# Patient Record
Sex: FEMALE | Race: WHITE | Employment: OTHER | URBAN - METROPOLITAN AREA
[De-identification: names, ages, dates, MRNs, and addresses within clinical notes are randomized per-mention and may not be internally consistent; named-entity substitution may affect disease eponyms.]

---

## 2017-02-07 ENCOUNTER — OFFICE VISIT (OUTPATIENT)
Dept: ORTHOPEDIC SURGERY | Age: 57
End: 2017-02-07

## 2017-02-07 VITALS
HEART RATE: 94 BPM | BODY MASS INDEX: 22.21 KG/M2 | DIASTOLIC BLOOD PRESSURE: 76 MMHG | WEIGHT: 130.07 LBS | HEIGHT: 64 IN | SYSTOLIC BLOOD PRESSURE: 121 MMHG

## 2017-02-07 DIAGNOSIS — M19.079 ARTHRITIS OF ANKLE JOINT: ICD-10-CM

## 2017-02-07 DIAGNOSIS — M25.571 RIGHT ANKLE PAIN, UNSPECIFIED CHRONICITY: Primary | ICD-10-CM

## 2017-02-07 PROCEDURE — 99212 OFFICE O/P EST SF 10 MIN: CPT | Performed by: ORTHOPAEDIC SURGERY

## 2017-02-07 PROCEDURE — 73610 X-RAY EXAM OF ANKLE: CPT | Performed by: ORTHOPAEDIC SURGERY

## 2017-02-23 ENCOUNTER — TELEPHONE (OUTPATIENT)
Dept: OTHER | Facility: CLINIC | Age: 57
End: 2017-02-23

## 2017-08-18 DIAGNOSIS — M19.079 ARTHRITIS OF ANKLE JOINT: Primary | ICD-10-CM

## 2017-08-18 PROCEDURE — L1902 AFO ANKLE GAUNTLET PRE OTS: HCPCS | Performed by: ORTHOPAEDIC SURGERY

## 2017-11-10 ENCOUNTER — OFFICE VISIT (OUTPATIENT)
Dept: ORTHOPEDIC SURGERY | Age: 57
End: 2017-11-10

## 2017-11-10 VITALS
SYSTOLIC BLOOD PRESSURE: 113 MMHG | HEART RATE: 75 BPM | HEIGHT: 64 IN | BODY MASS INDEX: 22.21 KG/M2 | DIASTOLIC BLOOD PRESSURE: 80 MMHG | WEIGHT: 130.07 LBS

## 2017-11-10 DIAGNOSIS — M19.079 ARTHRITIS OF ANKLE JOINT: Primary | ICD-10-CM

## 2017-11-10 DIAGNOSIS — M25.561 RIGHT KNEE PAIN, UNSPECIFIED CHRONICITY: ICD-10-CM

## 2017-11-10 PROCEDURE — 99213 OFFICE O/P EST LOW 20 MIN: CPT | Performed by: ORTHOPAEDIC SURGERY

## 2017-11-10 NOTE — PROGRESS NOTES
Subjective: Patient states that she is here for a new problem which is right knee and right ankle pain. She is status post pilon fracture open reduction internal fixation states her ankle is doing well and then about 6 months ago she started developing pain. She is here with her . Rates her pain at 7 out of 10. Being on it makes it worse ice and pain medicine make it better  Objective: Physical exam shows well-healed surgical incisions over the medial lateral aspect of the right ankle. She has 0° of dorsiflexion and 20° of plantarflexion anterior drawer and talar tilt showed no gross laxity sensations intact strength is at least 3+ to 4 minus over 5 the available range. He is alert and oriented ×3 DTRs are 2+ and symmetric has full range of motion of the right knee no varus valgus instability and posterior drawer negative Lachman's is negative strength is 4+ over 5 the knee flexion and extension  Imaging: 3 views of the right knee show mild degenerative changes. 3 views of the right ankle show hardware unchanged in position she has obvious posttraumatic arthritis of the ankle  Assessment and plan: This patient has posttraumatic arthritis of the right ankle and new onset right knee pain with no locking or catching. I would recommend she chief this symptomatically for right now. I could inject her ankle in the future if she wants.   We also talked about good therapy regimen

## 2018-08-30 ENCOUNTER — HOSPITAL ENCOUNTER (EMERGENCY)
Age: 58
Discharge: HOME | End: 2018-08-30
Attending: EMERGENCY MEDICINE
Payer: MEDICARE

## 2018-08-30 VITALS
WEIGHT: 120 LBS | DIASTOLIC BLOOD PRESSURE: 105 MMHG | BODY MASS INDEX: 20.49 KG/M2 | OXYGEN SATURATION: 100 % | HEART RATE: 75 BPM | SYSTOLIC BLOOD PRESSURE: 156 MMHG | RESPIRATION RATE: 16 BRPM | TEMPERATURE: 98.2 F

## 2018-08-30 DIAGNOSIS — F13.939 BENZODIAZEPINE WITHDRAWAL WITH COMPLICATION (HCC): Primary | ICD-10-CM

## 2018-08-30 DIAGNOSIS — R11.2 NON-INTRACTABLE VOMITING WITH NAUSEA, UNSPECIFIED VOMITING TYPE: ICD-10-CM

## 2018-08-30 LAB
A/G RATIO: 1.7 (ref 1.1–2.2)
ALBUMIN SERPL-MCNC: 5.3 G/DL (ref 3.4–5)
ALP BLD-CCNC: 55 U/L (ref 40–129)
ALT SERPL-CCNC: 12 U/L (ref 10–40)
ANION GAP SERPL CALCULATED.3IONS-SCNC: 16 MMOL/L (ref 3–16)
AST SERPL-CCNC: 13 U/L (ref 15–37)
BACTERIA: ABNORMAL /HPF
BILIRUB SERPL-MCNC: 0.6 MG/DL (ref 0–1)
BILIRUBIN URINE: ABNORMAL
BLOOD, URINE: NEGATIVE
BUN BLDV-MCNC: 12 MG/DL (ref 7–20)
CALCIUM SERPL-MCNC: 10.8 MG/DL (ref 8.3–10.6)
CHLORIDE BLD-SCNC: 97 MMOL/L (ref 99–110)
CLARITY: ABNORMAL
CO2: 28 MMOL/L (ref 21–32)
COLOR: YELLOW
CREAT SERPL-MCNC: 0.6 MG/DL (ref 0.6–1.1)
EPITHELIAL CELLS, UA: ABNORMAL /HPF
ETHANOL: NORMAL MG/DL (ref 0–0.08)
GFR AFRICAN AMERICAN: >60
GFR NON-AFRICAN AMERICAN: >60
GLOBULIN: 3.2 G/DL
GLUCOSE BLD-MCNC: 115 MG/DL (ref 70–99)
GLUCOSE URINE: NEGATIVE MG/DL
HCT VFR BLD CALC: 42.5 % (ref 36–48)
HEMOGLOBIN: 14.6 G/DL (ref 12–16)
KETONES, URINE: 15 MG/DL
LEUKOCYTE ESTERASE, URINE: NEGATIVE
LIPASE: 33 U/L (ref 13–60)
MCH RBC QN AUTO: 30.1 PG (ref 26–34)
MCHC RBC AUTO-ENTMCNC: 34.3 G/DL (ref 31–36)
MCV RBC AUTO: 87.6 FL (ref 80–100)
MICROSCOPIC EXAMINATION: YES
MUCUS: ABNORMAL /LPF
NITRITE, URINE: NEGATIVE
PDW BLD-RTO: 12.9 % (ref 12.4–15.4)
PH UA: 6
PLATELET # BLD: 475 K/UL (ref 135–450)
PMV BLD AUTO: 6.7 FL (ref 5–10.5)
POTASSIUM SERPL-SCNC: 3.8 MMOL/L (ref 3.5–5.1)
PROTEIN UA: 100 MG/DL
RBC # BLD: 4.85 M/UL (ref 4–5.2)
RBC UA: ABNORMAL /HPF (ref 0–2)
SODIUM BLD-SCNC: 141 MMOL/L (ref 136–145)
SPECIFIC GRAVITY UA: >=1.03
TOTAL PROTEIN: 8.5 G/DL (ref 6.4–8.2)
URINE TYPE: ABNORMAL
UROBILINOGEN, URINE: 0.2 E.U./DL
WBC # BLD: 8.1 K/UL (ref 4–11)
WBC UA: ABNORMAL /HPF (ref 0–5)

## 2018-08-30 PROCEDURE — 96375 TX/PRO/DX INJ NEW DRUG ADDON: CPT

## 2018-08-30 PROCEDURE — 99284 EMERGENCY DEPT VISIT MOD MDM: CPT

## 2018-08-30 PROCEDURE — 96372 THER/PROPH/DIAG INJ SC/IM: CPT

## 2018-08-30 PROCEDURE — 96361 HYDRATE IV INFUSION ADD-ON: CPT

## 2018-08-30 PROCEDURE — 81001 URINALYSIS AUTO W/SCOPE: CPT

## 2018-08-30 PROCEDURE — 96374 THER/PROPH/DIAG INJ IV PUSH: CPT

## 2018-08-30 PROCEDURE — 80053 COMPREHEN METABOLIC PANEL: CPT

## 2018-08-30 PROCEDURE — 6360000002 HC RX W HCPCS: Performed by: PHYSICIAN ASSISTANT

## 2018-08-30 PROCEDURE — 2580000003 HC RX 258: Performed by: PHYSICIAN ASSISTANT

## 2018-08-30 PROCEDURE — 85027 COMPLETE CBC AUTOMATED: CPT

## 2018-08-30 PROCEDURE — G0480 DRUG TEST DEF 1-7 CLASSES: HCPCS

## 2018-08-30 PROCEDURE — 83690 ASSAY OF LIPASE: CPT

## 2018-08-30 RX ORDER — ONDANSETRON 2 MG/ML
4 INJECTION INTRAMUSCULAR; INTRAVENOUS ONCE
Status: COMPLETED | OUTPATIENT
Start: 2018-08-30 | End: 2018-08-30

## 2018-08-30 RX ORDER — 0.9 % SODIUM CHLORIDE 0.9 %
1000 INTRAVENOUS SOLUTION INTRAVENOUS ONCE
Status: COMPLETED | OUTPATIENT
Start: 2018-08-30 | End: 2018-08-30

## 2018-08-30 RX ORDER — FLUTICASONE PROPIONATE 50 MCG
1 SPRAY, SUSPENSION (ML) NASAL DAILY
COMMUNITY

## 2018-08-30 RX ORDER — ONDANSETRON 4 MG/1
4 TABLET, ORALLY DISINTEGRATING ORAL EVERY 8 HOURS PRN
Qty: 20 TABLET | Refills: 0 | Status: SHIPPED | OUTPATIENT
Start: 2018-08-30

## 2018-08-30 RX ORDER — ALBUTEROL SULFATE 90 UG/1
2 AEROSOL, METERED RESPIRATORY (INHALATION) EVERY 6 HOURS PRN
COMMUNITY

## 2018-08-30 RX ORDER — TAMSULOSIN HYDROCHLORIDE 0.4 MG/1
0.4 CAPSULE ORAL DAILY
COMMUNITY

## 2018-08-30 RX ORDER — LORAZEPAM 2 MG/ML
0.5 INJECTION INTRAMUSCULAR ONCE
Status: COMPLETED | OUTPATIENT
Start: 2018-08-30 | End: 2018-08-30

## 2018-08-30 RX ORDER — ALENDRONATE SODIUM 70 MG/1
70 TABLET ORAL
COMMUNITY

## 2018-08-30 RX ORDER — PROMETHAZINE HYDROCHLORIDE 25 MG/ML
25 INJECTION, SOLUTION INTRAMUSCULAR; INTRAVENOUS ONCE
Status: COMPLETED | OUTPATIENT
Start: 2018-08-30 | End: 2018-08-30

## 2018-08-30 RX ADMIN — LORAZEPAM 0.5 MG: 2 INJECTION, SOLUTION INTRAMUSCULAR; INTRAVENOUS at 11:41

## 2018-08-30 RX ADMIN — ONDANSETRON HYDROCHLORIDE 4 MG: 2 INJECTION, SOLUTION INTRAMUSCULAR; INTRAVENOUS at 11:41

## 2018-08-30 RX ADMIN — PROMETHAZINE HYDROCHLORIDE 25 MG: 25 INJECTION INTRAMUSCULAR; INTRAVENOUS at 14:19

## 2018-08-30 RX ADMIN — SODIUM CHLORIDE 1000 ML: 9 INJECTION, SOLUTION INTRAVENOUS at 11:41

## 2018-08-30 RX ADMIN — SODIUM CHLORIDE 1000 ML: 9 INJECTION, SOLUTION INTRAVENOUS at 14:19

## 2018-08-30 NOTE — ED PROVIDER NOTES
I independently performed a history and physical on [facility]. All diagnostic, treatment, and disposition decisions were made by myself in conjunction with the advanced practice provider. For further details of [facility] emergency department encounter, please see advanced practice provider's documentation    This is a 62-year-old female presenting with nausea and vomiting over the past several days after being taken off Klonopin abruptly. Patient denies any pain anywhere. She is currently taking Norco as well but has some difficulty keeping it down because of her nausea and vomiting. Physical examination: Abdomen soft nontender    Patient was given several hours of supportive care and is feeling much better upon reassessment. Most likely her symptoms are related to Klonopin withdrawal and patient does not want to be put back on Klonopin. Patient is tolerating p.o. fluids prior to discharge.      Tiarra Romo,   08/31/18 1152

## 2018-08-30 NOTE — ED PROVIDER NOTES
kg/m²   GENERAL APPEARANCE: Awake and alert. Cooperative. No acute distress. HEAD: Normocephalic. Atraumatic. EYES: PERRL. EOM's grossly intact. ENT: Mucous membranes are moist.   NECK: Supple. No JVD. No tracheal tenderness or deviation. No crepitus. HEART: RRR. No murmurs. LUNGS: Respirations unlabored. CTAB. Good air exchange. Speaking comfortably in full sentences. ABDOMEN: Soft. Non-distended. Non-tender. No guarding or rebound. Negative Saini's, McBurney's, Rovsing's. No fluids or ascites. No hernias or masses. Bowel sounds normal August.  No CVA tenderness. EXTREMITIES: No peripheral edema. No unilateral calf pain, redness or swelling. Moves all extremities equally. All extremities neurovascularly intact. SKIN: Warm and dry. No acute rashes. NEUROLOGICAL: Alert and oriented. CN's 2-12 intact. No gross facial drooping. Strength 5/5, sensation intact. PSYCHIATRIC: Normal mood and affect. Seems slightly withdrawn. No hallucinations or delusions. No homicidal or suicidal ideations. ED COURSE   I have evaluated this patient in collaboration with Dr. Ashli Zayas. Pain control was not required while here in the emergency department. triage vitals are stable. Urinalysis 3-5 whites, 3-5 obese with 3+ bacteria. Appears to be a contaminated specimen. Patient continues to have dysuria. He is without leukocytosis or anemia. CMP unremarkable. Lipase negative. No ethanol was detected. By mouth challenge was tolerated well however patient did vomit shortly thereafter. Was given dose of Phenergan and additional fluids. On reevaluation feels better and comfortable with discharge. He'll be discharged home on oral antiemetics with recommendations for close and continued to be follow-up. Return precautions discussed and patient is in agreement and comfortable discharge. A discussion was had Mrs. Malin regarding nausea and vomiting, ED findings and recommendations for follow-up.  All YES     Urine Type Not Specified    Microscopic Urinalysis   Result Value Ref Range    Mucus, UA 4+ (A) /LPF    WBC, UA 3-5 0 - 5 /HPF    RBC, UA 0-2 0 - 2 /HPF    Epi Cells 3-5 /HPF    Bacteria, UA 3+ (A) /HPF     I estimate there is LOW risk for ACUTE CORONARY SYNDROME, INTRACRANIAL HEMORRHAGE, MALIGNANT DYSRHYTHMIA, MENINGITIS, PNEUMONIA, PULMONARY EMBOLISM, SEPSIS, SUBARACHNOID HEMORRHAGE, SUBDURAL HEMATOMA, STROKE, or URINARY TRACT INFECTION, thus I consider the discharge disposition reasonable. Mic Schmitt and I have discussed the diagnosis and risks, and we agree with discharging home to follow-up with their primary doctor. We also discussed returning to the Emergency Department immediately if new or worsening symptoms occur. We have discussed the symptoms which are most concerning (e.g., changing or worsening pain, weakness, vomiting, fever) that necessitate immediate return. Final Impression  1. Benzodiazepine withdrawal with complication ([address]    2. Non-intractable vomiting with nausea, unspecified vomiting type      Blood pressure (!) 156/105, pulse 75, temperature 98.2 °F (36.8 °C), resp. rate 16, weight 120 lb (54.4 kg), last menstrual period 09/22/2011, SpO2 100 %. DISPOSITION  Patient was discharged to home in good condition.          [place], [place]  36/80/99 7504

## 2019-07-15 ENCOUNTER — TELEPHONE (OUTPATIENT)
Dept: ORTHOPEDIC SURGERY | Age: 59
End: 2019-07-15

## 2019-07-15 DIAGNOSIS — M25.559 ARTHRALGIA OF HIP, UNSPECIFIED LATERALITY: Primary | ICD-10-CM

## 2019-08-13 ENCOUNTER — HOSPITAL ENCOUNTER (OUTPATIENT)
Dept: PHYSICAL THERAPY | Age: 59
Setting detail: THERAPIES SERIES
Discharge: HOME | End: 2019-08-13
Payer: MEDICARE

## 2019-08-13 PROCEDURE — 97162 PT EVAL MOD COMPLEX 30 MIN: CPT

## 2019-08-13 PROCEDURE — 97530 THERAPEUTIC ACTIVITIES: CPT

## 2019-08-13 ASSESSMENT — PAIN SCALES - GENERAL: PAINLEVEL_OUTOF10: 6

## 2019-08-13 ASSESSMENT — PAIN DESCRIPTION - PAIN TYPE: TYPE: CHRONIC PAIN

## 2019-08-13 ASSESSMENT — PAIN DESCRIPTION - LOCATION: LOCATION: HIP;BUTTOCKS;BACK

## 2019-08-13 ASSESSMENT — PAIN DESCRIPTION - ORIENTATION: ORIENTATION: LEFT

## 2019-08-13 NOTE — PROGRESS NOTES
Manual Therapy - Joint Manipulation, Home Exercise Program, Endurance Training, Gait Training    Goals  Short term goals  Time Frame for Short term goals: 4 weeks  Short term goal 1: pt will be indep in HEP  Short term goal 2: pt will decrease pain 25%  Short term goal 3: pt will increase B hip and lumbar stabilizer strength to 4/5  Short term goal 4: pt will return to clinic with symmetrical pelvic alignment  Short term goal 5: pt will return to walking program       Therapy Time   Individual Concurrent Group Co-treatment   Time In 1600         Time Out 1700         Minutes 60         Timed Code Treatment Minutes: [address]  [facility], [address] [facility] [address]

## 2019-08-23 ENCOUNTER — HOSPITAL ENCOUNTER (OUTPATIENT)
Dept: PHYSICAL THERAPY | Age: 59
Setting detail: THERAPIES SERIES
Discharge: HOME | End: 2019-08-23
Payer: MEDICARE

## 2019-08-23 PROCEDURE — 97110 THERAPEUTIC EXERCISES: CPT

## 2019-08-23 PROCEDURE — 97140 MANUAL THERAPY 1/> REGIONS: CPT

## 2019-08-23 NOTE — FLOWSHEET NOTE
Physical Therapy Daily Treatment Note    Date:  2019    Patient Name:  Asuncion Matamoros    :  1960  MRN: [id number]  Restrictions/Precautions:    Medical/Treatment Diagnosis Information:  · Diagnosis: arthralgia of hip  Insurance/Certification information:  PT Insurance Information: [employer]  Physician Information:  Referring Practitioner: Aneta Levi MD  Plan of care signed (Y/N):  N  Visit# / total visits:       Time in:   9:30     Timed Treatment: 30 Total Treatment Time:  30  Time out: 10:00  ________________________________________________________________________________________    Pain Level:    /10  SUBJECTIVE:  Pt reports that she is leaving for [address] next week and will be gone for an unknown amount of time. Would like an exercise program to work on while there. OBJECTIVE:     Exercise/Equipment Resistance/Repetitions Other comments   TG 5 min           Clams #1 10x3\" hold LLE    bridge 10x3\" hold    Supine clams  SL hip ER with stabilization  x15  x10 B Green TB   TA set Until control demo'd  3 min                                                                                                    Other Therapeutic Activities:  Pt educated on biomechanics and anatomy of lumbar spine, sacrum, SIJ, hip joint, lumbopelvic stabilizers, and neurodynamics. Manual Treatments:   LLE inferior hip distraction mobilization grade IV; LLE side-lying hip mobilizations grade III/IV; neural tracing glut LLE; STM to L glut     Modalities:      Test/Measurements:         ASSESSMENT:    Pt tolerated session well. Increased L hip mobility following session. Pt with dizziness and staggering upon position change. Pt will return to PT when she returns from [address].     Treatment/Activity Tolerance:   [x]Patient tolerated treatment well [] Patient limited by fatique  []Patient limited by pain [] Patient limited by other medical complications  [] Other:     Goals:    Short term goals  Time Frame for

## 2019-08-28 ENCOUNTER — HOSPITAL ENCOUNTER (OUTPATIENT)
Dept: PHYSICAL THERAPY | Age: 59
Setting detail: THERAPIES SERIES
End: 2019-08-28
Payer: MEDICARE

## 2019-08-30 ENCOUNTER — APPOINTMENT (OUTPATIENT)
Dept: PHYSICAL THERAPY | Age: 59
End: 2019-08-30
Payer: MEDICARE

## 2019-12-17 ENCOUNTER — HOSPITAL ENCOUNTER (OUTPATIENT)
Dept: PHYSICAL THERAPY | Age: 59
Setting detail: THERAPIES SERIES
Discharge: HOME | End: 2019-12-17
Payer: MEDICARE

## 2019-12-17 PROCEDURE — 97112 NEUROMUSCULAR REEDUCATION: CPT

## 2019-12-17 PROCEDURE — 97162 PT EVAL MOD COMPLEX 30 MIN: CPT

## 2019-12-17 ASSESSMENT — PAIN SCALES - GENERAL: PAINLEVEL_OUTOF10: 5

## 2019-12-17 ASSESSMENT — PAIN DESCRIPTION - ORIENTATION: ORIENTATION: LEFT

## 2019-12-17 ASSESSMENT — PAIN DESCRIPTION - LOCATION: LOCATION: BUTTOCKS

## 2019-12-24 ENCOUNTER — HOSPITAL ENCOUNTER (OUTPATIENT)
Dept: PHYSICAL THERAPY | Age: 59
Setting detail: THERAPIES SERIES
Discharge: HOME | End: 2019-12-24
Payer: MEDICARE

## 2019-12-27 ENCOUNTER — HOSPITAL ENCOUNTER (OUTPATIENT)
Dept: PHYSICAL THERAPY | Age: 59
Setting detail: THERAPIES SERIES
Discharge: HOME | End: 2019-12-27
Payer: MEDICARE

## 2019-12-27 PROCEDURE — 97110 THERAPEUTIC EXERCISES: CPT

## 2019-12-27 PROCEDURE — 97140 MANUAL THERAPY 1/> REGIONS: CPT

## 2019-12-31 ENCOUNTER — HOSPITAL ENCOUNTER (OUTPATIENT)
Dept: PHYSICAL THERAPY | Age: 59
Setting detail: THERAPIES SERIES
Discharge: HOME | End: 2019-12-31
Payer: MEDICARE

## 2019-12-31 PROCEDURE — 97110 THERAPEUTIC EXERCISES: CPT

## 2019-12-31 NOTE — FLOWSHEET NOTE
strength to 4/5  Short term goal 4: pt will ambulate without lumbar extension rotation patterns           Plan: [x] Continue per plan of care [] Alter current plan (see comments)   [] Plan of care initiated [] Hold pending MD visit [] Discharge      Plan for Next Session:  Biomechanical correction of problems as they present. Facilitate correct muscle firing patterns and activation with appropriate activities. Progress patient as tolerated.      Re-Certification Due Date:         Signature:  Bri Rubio PT

## 2020-01-03 ENCOUNTER — HOSPITAL ENCOUNTER (OUTPATIENT)
Dept: PHYSICAL THERAPY | Age: 60
Setting detail: THERAPIES SERIES
Discharge: HOME | End: 2020-01-03
Payer: MEDICARE

## 2020-01-03 PROCEDURE — 97110 THERAPEUTIC EXERCISES: CPT

## 2020-01-03 NOTE — FLOWSHEET NOTE
Physical Therapy Daily Treatment Note    Date:  1/3/2020    Patient Name:  Elfreda Galeazzi    :  1960  MRN: [id number]  Restrictions/Precautions:    Medical/Treatment Diagnosis Information:  · Diagnosis: lumbar facet arthropathy, lumbar DDD, R ankle OA  Insurance/Certification information:  PT Insurance Information: [employer]  Physician Information:  Referring Practitioner: Candelario Claudio MD  Plan of care signed (Y/N):  N  Visit# / total visits:      G-Code (if applicable):          LEFS 15/80    Time in:   1:30      Timed Treatment: 30 Total Treatment Time:  30  ________________________________________________________________________________________    Pain Level:    /10  SUBJECTIVE:  Pt reports that her arms and neck were sore after the last session. OBJECTIVE: stabilization protocol    Exercise/Equipment Resistance/Repetitions Other comments   TG 5 min           TA set    BKF    Marches 15x5\" hold  x10 B  x10 B    Bridge x10    Supine clams  SL hip ER with stabilization  Green TB   Supine H' neuro re-ed 4 min BLE IR/ER                                                                                                   Other Therapeutic Activities:  Pt educated on POC. Pt also educated on anatomy and biomechanics of lumbar spine, sacrum, pelvis, and neurodynamics of BLEs. Manual Treatments:   Inferior hip distraction grade IV BLE; BLE leg pull      Modalities:      Test/Measurements:  See eval       ASSESSMENT:  Pt tolerated session well. Progress as pt tolerates.      Treatment/Activity Tolerance:   [x]Patient tolerated treatment well [] Patient limited by fatique  []Patient limited by pain [] Patient limited by other medical complications  [] Other:     Goals:    Short term goals  Time Frame for Short term goals: 4 weeks  Short term goal 1: pt will be indep in HEP  Short term goal 2: pt will decrease pain 25%  Short term goal 3: pt will increase B hip and lumbar stabilizer strength to 4/5  Short term goal 4: pt will ambulate without lumbar extension rotation patterns           Plan: [x] Continue per plan of care [] Alter current plan (see comments)   [] Plan of care initiated [] Hold pending MD visit [] Discharge      Plan for Next Session:  Biomechanical correction of problems as they present. Facilitate correct muscle firing patterns and activation with appropriate activities. Progress patient as tolerated.      Re-Certification Due Date:         Signature:  Bib Muñiz PT

## 2020-01-07 ENCOUNTER — HOSPITAL ENCOUNTER (OUTPATIENT)
Dept: PHYSICAL THERAPY | Age: 60
Setting detail: THERAPIES SERIES
Discharge: HOME | End: 2020-01-07
Payer: MEDICARE

## 2020-01-07 PROCEDURE — 97110 THERAPEUTIC EXERCISES: CPT

## 2020-01-07 NOTE — FLOWSHEET NOTE
Physical Therapy Daily Treatment Note    Date:  2020    Patient Name:  Amber Villareal    :  1960  MRN: [id number]  Restrictions/Precautions:    Medical/Treatment Diagnosis Information:  · Diagnosis: lumbar facet arthropathy, lumbar DDD, R ankle OA  Insurance/Certification information:  PT Insurance Information: [place]  Physician Information:  Referring Practitioner: Khari Mcclellan MD  Plan of care signed (Y/N):  N  Visit# / total visits:      G-Code (if applicable):          LEFS 15/80    Time in:   2:30      Timed Treatment: 30 Total Treatment Time:  30  ________________________________________________________________________________________    Pain Level:    /10  SUBJECTIVE:  Pt reports that her arms and neck were sore after the last session. OBJECTIVE: stabilization protocol    Exercise/Equipment Resistance/Repetitions Other comments   TG 5 min           TA set    BKF    Marches     Bridge x10    Supine clams  SL hip ER with stabilization x15  x10 Green TB   Supine H' neuro re-ed 4 min BLE IR/ER   3-way ball compression 2x30\"    Supine sliders 2x15 LLE    Supine lumbopelvic stabilization 2 min                                                                               Other Therapeutic Activities:  Pt educated on POC. Pt also educated on anatomy and biomechanics of lumbar spine, sacrum, pelvis, and neurodynamics of BLEs. Manual Treatments:   Inferior hip distraction grade IV BLE; BLE leg pull      Modalities:      Test/Measurements:  See eval       ASSESSMENT:  Pt tolerated session well. Progress as pt tolerates.      Treatment/Activity Tolerance:   [x]Patient tolerated treatment well [] Patient limited by fatique  []Patient limited by pain [] Patient limited by other medical complications  [] Other:     Goals:    Short term goals  Time Frame for Short term goals: 4 weeks  Short term goal 1: pt will be indep in HEP  Short term goal 2: pt will decrease pain 25%  Short term goal 3: pt will

## 2020-01-10 ENCOUNTER — HOSPITAL ENCOUNTER (OUTPATIENT)
Dept: PHYSICAL THERAPY | Age: 60
Setting detail: THERAPIES SERIES
Discharge: HOME | End: 2020-01-10
Payer: MEDICARE

## 2020-01-10 PROCEDURE — 97110 THERAPEUTIC EXERCISES: CPT

## 2020-01-10 NOTE — FLOWSHEET NOTE
Physical Therapy Daily Treatment Note    Date:  1/10/2020    Patient Name:  Laxmi Barber    :  1960  MRN: [id number]  Restrictions/Precautions:    Medical/Treatment Diagnosis Information:  · Diagnosis: lumbar facet arthropathy, lumbar DDD, R ankle OA  Insurance/Certification information:  PT Insurance Information: [place]  Physician Information:  Referring Practitioner: James Carter MD  Plan of care signed (Y/N):  N  Visit# / total visits:      G-Code (if applicable):          LEFS 15/80    Time in:   1:30      Timed Treatment: 30 Total Treatment Time:  30  ________________________________________________________________________________________    Pain Level:    10  SUBJECTIVE:  Nothing new to report. OBJECTIVE: stabilization protocol    Exercise/Equipment Resistance/Repetitions Other comments   TG 5 min           TA set    BKF    Marches     Bridge x10    Supine clams  SL hip ER with stabilization x15  x10 Green TB   Supine H' neuro re-ed 4 min BLE IR/ER   3-way ball compression 2x30\"    Supine sliders 2x15 LLE    Supine lumbopelvic stabilization 2 min                                                                               Other Therapeutic Activities:  Pt educated on POC. Pt also educated on anatomy and biomechanics of lumbar spine, sacrum, pelvis, and neurodynamics of BLEs. Manual Treatments:   Inferior hip distraction grade IV BLE; BLE leg pull      Modalities:      Test/Measurements:  See eval       ASSESSMENT:  Pt tolerated session well. Progress as pt tolerates.      Treatment/Activity Tolerance:   [x]Patient tolerated treatment well [] Patient limited by fatique  []Patient limited by pain [] Patient limited by other medical complications  [] Other:     Goals:    Short term goals  Time Frame for Short term goals: 4 weeks  Short term goal 1: pt will be indep in HEP  Short term goal 2: pt will decrease pain 25%  Short term goal 3: pt will increase B hip and lumbar stabilizer

## 2020-01-14 ENCOUNTER — HOSPITAL ENCOUNTER (OUTPATIENT)
Dept: PHYSICAL THERAPY | Age: 60
Setting detail: THERAPIES SERIES
Discharge: HOME | End: 2020-01-14
Payer: MEDICARE

## 2020-01-14 PROCEDURE — 97110 THERAPEUTIC EXERCISES: CPT

## 2020-01-14 NOTE — FLOWSHEET NOTE
Physical Therapy Daily Treatment Note    Date:  2020    Patient Name:  Brayden Hartmann    :  1960  MRN: [id number]  Restrictions/Precautions:    Medical/Treatment Diagnosis Information:  · Diagnosis: lumbar facet arthropathy, lumbar DDD, R ankle OA  Insurance/Certification information:  PT Insurance Information: [place]  Physician Information:  Referring Practitioner: Stacia Gonsalves MD  Plan of care signed (Y/N):  N  Visit# / total visits:      G-Code (if applicable):          LEFS 15/80    Time in:   1:52      Timed Treatment: 38 Total Treatment Time:  38  ________________________________________________________________________________________    Pain Level:    /10  SUBJECTIVE:  Nothing new to report. OBJECTIVE: stabilization protocol    Exercise/Equipment Resistance/Repetitions Other comments   TG 5 min           TA set    BKF    Marches     Bridge x10    Supine clams  SL hip ER with stabilization x15  x10 Green TB   Supine H' neuro re-ed 4 min BLE IR/ER   3-way ball compression 3x30\"    Supine sliders 2x15 LLE    Supine lumbopelvic stabilization 2 min             Multif press x15 B    Standing hip abduction x15 B 10#                                                      Other Therapeutic Activities:  Pt educated on POC. Pt also educated on anatomy and biomechanics of lumbar spine, sacrum, pelvis, and neurodynamics of BLEs. Manual Treatments:   Inferior hip distraction grade IV BLE; BLE leg pull      Modalities:      Test/Measurements:  See eval       ASSESSMENT:  Pt tolerated session well. Progress as pt tolerates.      Treatment/Activity Tolerance:   [x]Patient tolerated treatment well [] Patient limited by fatique  []Patient limited by pain [] Patient limited by other medical complications  [] Other:     Goals:    Short term goals  Time Frame for Short term goals: 4 weeks  Short term goal 1: pt will be indep in HEP  Short term goal 2: pt will decrease pain 25%  Short term goal 3: pt will increase B hip and lumbar stabilizer strength to 4/5  Short term goal 4: pt will ambulate without lumbar extension rotation patterns           Plan: [x] Continue per plan of care [] Alter current plan (see comments)   [] Plan of care initiated [] Hold pending MD visit [] Discharge      Plan for Next Session:  Biomechanical correction of problems as they present. Facilitate correct muscle firing patterns and activation with appropriate activities. Progress patient as tolerated.      Re-Certification Due Date:         Signature:  Caprice Tuttle , PT

## 2020-01-28 ENCOUNTER — HOSPITAL ENCOUNTER (OUTPATIENT)
Dept: PHYSICAL THERAPY | Age: 60
Setting detail: THERAPIES SERIES
Discharge: HOME | End: 2020-01-28
Payer: MEDICARE

## 2020-02-04 ENCOUNTER — APPOINTMENT (OUTPATIENT)
Dept: PHYSICAL THERAPY | Age: 60
End: 2020-02-04
Payer: MEDICARE

## 2020-02-07 ENCOUNTER — HOSPITAL ENCOUNTER (OUTPATIENT)
Dept: PHYSICAL THERAPY | Age: 60
Setting detail: THERAPIES SERIES
Discharge: HOME | End: 2020-02-07
Payer: MEDICARE

## 2020-02-07 PROCEDURE — 97110 THERAPEUTIC EXERCISES: CPT

## 2020-02-07 NOTE — FLOWSHEET NOTE
25%  Short term goal 3: pt will increase B hip and lumbar stabilizer strength to 4/5  Short term goal 4: pt will ambulate without lumbar extension rotation patterns           Plan: [x] Continue per plan of care [] Alter current plan (see comments)   [] Plan of care initiated [] Hold pending MD visit [] Discharge      Plan for Next Session:  Biomechanical correction of problems as they present. Facilitate correct muscle firing patterns and activation with appropriate activities. Progress patient as tolerated.      Re-Certification Due Date:         Signature:  Andrea Mehta PT

## 2020-02-11 ENCOUNTER — HOSPITAL ENCOUNTER (OUTPATIENT)
Dept: PHYSICAL THERAPY | Age: 60
Setting detail: THERAPIES SERIES
Discharge: HOME | End: 2020-02-11
Payer: MEDICARE

## 2020-02-11 PROCEDURE — 97112 NEUROMUSCULAR REEDUCATION: CPT

## 2020-02-11 PROCEDURE — 97110 THERAPEUTIC EXERCISES: CPT

## 2020-02-11 NOTE — FLOWSHEET NOTE
Physical Therapy Daily Treatment Note    Date:  2020    Patient Name:  Duane Gasca    :  1960  MRN: [id number]  Restrictions/Precautions:    Medical/Treatment Diagnosis Information:  · Diagnosis: lumbar facet arthropathy, lumbar DDD, R ankle OA  Insurance/Certification information:  PT Insurance Information: [place]  Physician Information:  Referring Practitioner: Juan Gomez MD  Plan of care signed (Y/N):  N  Visit# / total visits:      G-Code (if applicable):          LEFS 15/80    Time in:   2:00    Timed Treatment: 40 Total Treatment Time:  40  ________________________________________________________________________________________    Pain Level:    /10  SUBJECTIVE:  Pt reports that she will be meeting with a surgeon soon. OBJECTIVE: stabilization protocol    Exercise/Equipment Resistance/Repetitions Other comments   TG 5 min           TA set    BKF    Marches     Bridge     Supine clams  SL hip ER with stabilization  Green TB   Supine H' neuro re-ed 4 min BLE IR/ER   3-way ball compression 3x30\"    Supine sliders 2x15 LLE    Supine lumbopelvic stabilization 2 min             Multif press x15 B    Standing hip abduction x15 B 10#   S' extension x15    Lat walking 4 laps green   Airex balance 2 min                                  Other Therapeutic Activities:  Pt educated on POC. Pt also educated on anatomy and biomechanics of lumbar spine, sacrum, pelvis, and neurodynamics of BLEs. Manual Treatments:   Inferior hip distraction grade IV BLE; BLE leg pull      Modalities:      Test/Measurements:  See eval       ASSESSMENT:  Pt tolerated session well. Progress as pt tolerates.      Treatment/Activity Tolerance:   [x]Patient tolerated treatment well [] Patient limited by fatique  []Patient limited by pain [] Patient limited by other medical complications  [] Other:     Goals:    Short term goals  Time Frame for Short term goals: 4 weeks  Short term goal 1: pt will be indep in HEP  Short term goal 2: pt will decrease pain 25%  Short term goal 3: pt will increase B hip and lumbar stabilizer strength to 4/5  Short term goal 4: pt will ambulate without lumbar extension rotation patterns           Plan: [x] Continue per plan of care [] Alter current plan (see comments)   [] Plan of care initiated [] Hold pending MD visit [] Discharge      Plan for Next Session:  Biomechanical correction of problems as they present. Facilitate correct muscle firing patterns and activation with appropriate activities. Progress patient as tolerated.      Re-Certification Due Date:         Signature:  Mary Ceja , PT

## 2020-02-18 ENCOUNTER — HOSPITAL ENCOUNTER (OUTPATIENT)
Dept: PHYSICAL THERAPY | Age: 60
Setting detail: THERAPIES SERIES
Discharge: HOME | End: 2020-02-18
Payer: MEDICARE

## 2020-02-18 PROCEDURE — 97110 THERAPEUTIC EXERCISES: CPT

## 2020-02-18 NOTE — FLOWSHEET NOTE
Physical Therapy Daily Treatment Note    Date:  2020    Patient Name:  Kevin Medrano    :  1960  MRN: [id number]  Restrictions/Precautions:    Medical/Treatment Diagnosis Information:  · Diagnosis: lumbar facet arthropathy, lumbar DDD, R ankle OA  Insurance/Certification information:  PT Insurance Information: [place]  Physician Information:  Referring Practitioner: Allie Rainey MD  Plan of care signed (Y/N):  N  Visit# / total visits:     G-Code (if applicable):          LEFS 15/80    Time in:   3:30    Timed Treatment: 30 Total Treatment Time:  30  ________________________________________________________________________________________    Pain Level:    /10  SUBJECTIVE:  Pt reports that she was \"approved for back surgery. \"    OBJECTIVE: stabilization protocol    Exercise/Equipment Resistance/Repetitions Other comments   TG 5 min           TA set    BKF    Marches     Bridge     Supine clams  SL hip ER with stabilization  Green TB   Prone H' neuro re-ed 4 min BLE IR/ER   3-way ball compression 3x30\"    Supine sliders     Supine lumbopelvic stabilization 2 min             Multif press x15 B    Standing hip abduction x15 B 10#   S' extension x15    Lat walking 4 laps green   Airex balance 2 min                                  Other Therapeutic Activities:  Pt educated on POC. Pt also educated on anatomy and biomechanics of lumbar spine, sacrum, pelvis, and neurodynamics of BLEs. Manual Treatments:   Inferior hip distraction grade IV BLE; BLE leg pull      Modalities:      Test/Measurements:  See eval       ASSESSMENT:  Pt tolerated session well. Progress as pt tolerates.      Treatment/Activity Tolerance:   [x]Patient tolerated treatment well [] Patient limited by fatique  []Patient limited by pain [] Patient limited by other medical complications  [] Other:     Goals:    Short term goals  Time Frame for Short term goals: 4 weeks  Short term goal 1: pt will be indep in HEP  Short term

## 2020-02-20 ENCOUNTER — HOSPITAL ENCOUNTER (OUTPATIENT)
Dept: PHYSICAL THERAPY | Age: 60
Setting detail: THERAPIES SERIES
Discharge: HOME | End: 2020-02-20
Payer: MEDICARE

## 2020-02-20 PROCEDURE — 97110 THERAPEUTIC EXERCISES: CPT

## 2020-02-20 NOTE — FLOWSHEET NOTE
indep in HEP  Short term goal 2: pt will decrease pain 25%  Short term goal 3: pt will increase B hip and lumbar stabilizer strength to 4/5  Short term goal 4: pt will ambulate without lumbar extension rotation patterns           Plan: [x] Continue per plan of care [] Alter current plan (see comments)   [] Plan of care initiated [] Hold pending MD visit [] Discharge      Plan for Next Session:  Biomechanical correction of problems as they present. Facilitate correct muscle firing patterns and activation with appropriate activities. Progress patient as tolerated.      Re-Certification Due Date:         Signature:  Eusebio Ramirez , PT

## 2020-02-25 ENCOUNTER — HOSPITAL ENCOUNTER (OUTPATIENT)
Dept: PHYSICAL THERAPY | Age: 60
Setting detail: THERAPIES SERIES
Discharge: HOME | End: 2020-02-25
Payer: MEDICARE

## 2020-02-25 PROCEDURE — 97110 THERAPEUTIC EXERCISES: CPT

## 2020-02-25 PROCEDURE — 97140 MANUAL THERAPY 1/> REGIONS: CPT

## 2020-02-25 NOTE — FLOWSHEET NOTE
Physical Therapy Daily Treatment Note    Date:  2020    Patient Name:  Duane Gasca    :  1960  MRN: [id number]  Restrictions/Precautions:    Medical/Treatment Diagnosis Information:  · Diagnosis: lumbar facet arthropathy, lumbar DDD, R ankle OA  Insurance/Certification information:  PT Insurance Information: [place]  Physician Information:  Referring Practitioner: Juan Gomez MD  Plan of care signed (Y/N):  N  Visit# / total visits: 8/8 3/8    G-Code (if applicable):          LEFS 15/80    Time in:   3:30    Timed Treatment: 41 Total Treatment Time:  41  ________________________________________________________________________________________    Pain Level:    /10  SUBJECTIVE:  Pt reports, \"I felt like I got kicked in the back yesterday. \"    OBJECTIVE: stabilization protocol    Exercise/Equipment Resistance/Repetitions Other comments   TG 5 min           TA set    BKF    Veronda Mater 10x5\" hold with band at knees    Supine clams  SL hip ER with stabilization  Green TB   Prone H' neuro re-ed 4 min BLE IR/ER   3-way ball compression 3x30\"    Supine sliders     Supine lumbopelvic stabilization 2 min             Multif press x15 B    Standing hip abduction x15 B 10#   S' extension x15    Lat walking 4 laps green   Airex balance 2                                  Other Therapeutic Activities:  Pt educated on POC. Pt also educated on anatomy and biomechanics of lumbar spine, sacrum, pelvis, and neurodynamics of BLEs. Manual Treatments:   Inferior hip distraction grade IV BLE; breaking bread to lumbar spine    Modalities:      Test/Measurements:  See eval       ASSESSMENT:  Pt tolerated session well. Progress as pt tolerates.      Treatment/Activity Tolerance:   [x]Patient tolerated treatment well [] Patient limited by fatique  []Patient limited by pain [] Patient limited by other medical complications  [] Other:     Goals:    Short term goals  Time Frame for Short term goals: 4 weeks  Short term goal 1: pt will be indep in HEP  Short term goal 2: pt will decrease pain 25%  Short term goal 3: pt will increase B hip and lumbar stabilizer strength to 4/5  Short term goal 4: pt will ambulate without lumbar extension rotation patterns           Plan: [x] Continue per plan of care [] Alter current plan (see comments)   [] Plan of care initiated [] Hold pending MD visit [] Discharge      Plan for Next Session:  Biomechanical correction of problems as they present. Facilitate correct muscle firing patterns and activation with appropriate activities. Progress patient as tolerated.      Re-Certification Due Date:         Signature:  Angélica Menendez , PT

## 2020-02-28 ENCOUNTER — HOSPITAL ENCOUNTER (OUTPATIENT)
Dept: PHYSICAL THERAPY | Age: 60
Setting detail: THERAPIES SERIES
Discharge: HOME | End: 2020-02-28
Payer: MEDICARE

## 2020-02-28 PROCEDURE — 97140 MANUAL THERAPY 1/> REGIONS: CPT

## 2020-02-28 PROCEDURE — 97110 THERAPEUTIC EXERCISES: CPT

## 2020-02-28 NOTE — FLOWSHEET NOTE
Short term goals: 4 weeks  Short term goal 1: pt will be indep in HEP  Short term goal 2: pt will decrease pain 25%  Short term goal 3: pt will increase B hip and lumbar stabilizer strength to 4/5  Short term goal 4: pt will ambulate without lumbar extension rotation patterns           Plan: [x] Continue per plan of care [] Alter current plan (see comments)   [] Plan of care initiated [] Hold pending MD visit [] Discharge      Plan for Next Session:  Biomechanical correction of problems as they present. Facilitate correct muscle firing patterns and activation with appropriate activities. Progress patient as tolerated.      Re-Certification Due Date:         Signature:  Nicole Vences , PT

## 2020-03-03 ENCOUNTER — APPOINTMENT (OUTPATIENT)
Dept: PHYSICAL THERAPY | Age: 60
End: 2020-03-03
Payer: MEDICARE

## 2020-03-06 ENCOUNTER — HOSPITAL ENCOUNTER (OUTPATIENT)
Dept: PHYSICAL THERAPY | Age: 60
Setting detail: THERAPIES SERIES
Discharge: HOME | End: 2020-03-06
Payer: MEDICARE

## 2020-03-06 NOTE — PROGRESS NOTES
Physical Therapy  Cancellation/No-show Note  Patient Name:  Florinda Portillo  :  1960   Date:  3/6/2020  Cancels to date:   1No-shows to date: 2    For today's appointment patient:  [x] Cancelled  [] Rescheduled appointment  [] No-show     Reason given by patient:  [] Patient ill  [] Conflicting appointment  [] No transportation    [] Conflict with work  [] No reason given  [x] Other: Pt reports that she sprained her ankle and in unable to get her foot into a shoe.      Comments:      Electronically signed by:  Theo Nuñez PT

## 2020-03-10 ENCOUNTER — HOSPITAL ENCOUNTER (OUTPATIENT)
Dept: PHYSICAL THERAPY | Age: 60
Setting detail: THERAPIES SERIES
Discharge: HOME | End: 2020-03-10
Payer: MEDICARE

## 2020-03-10 PROCEDURE — 97110 THERAPEUTIC EXERCISES: CPT

## 2020-03-10 PROCEDURE — 97140 MANUAL THERAPY 1/> REGIONS: CPT

## 2020-03-10 NOTE — FLOWSHEET NOTE
Physical Therapy Daily Treatment Note    Date:  3/10/2020    Patient Name:  Mena Patel    :  1960  MRN: [id number]  Restrictions/Precautions:    Medical/Treatment Diagnosis Information:  · Diagnosis: lumbar facet arthropathy, lumbar DDD, R ankle OA  Insurance/Certification information:  PT Insurance Information: [place]  Physician Information:  Referring Practitioner: Nery Ennis MD  Plan of care signed (Y/N):  N  Visit# / total visits:     G-Code (if applicable):          LEFS 15/80    Time in:   2:00    Timed Treatment: 30 Total Treatment Time:  30  ________________________________________________________________________________________    Pain Level:    10  SUBJECTIVE:  Pt reports that she twisted her ankle last week and was unable to get around. OBJECTIVE: stabilization protocol    Exercise/Equipment Resistance/Repetitions Other comments   TG 5 min           TA set    BKF    Mamadou Peel 10x5\" hold with band at knees    Supine clams  SL hip ER with stabilization  Green TB   Prone H' neuro re-ed 4 min BLE IR/ER   3-way ball compression 3x30\"    Supine sliders     Supine lumbopelvic stabilization              Multif press x15 B Band at knees   Standing hip abduction x15 B 10#   S' extension x15 Band at knees   Lat walking 4 laps green   Airex balance 2 min                                  Other Therapeutic Activities:  Pt educated on POC. Pt also educated on anatomy and biomechanics of lumbar spine, sacrum, pelvis, and neurodynamics of BLEs. Manual Treatments:   Inferior hip distraction grade IV BLE; breaking bread to lumbar spine    Modalities:      Test/Measurements:  See eval       ASSESSMENT:  Pt tolerated session well. Progress as pt tolerates.      Treatment/Activity Tolerance:   [x]Patient tolerated treatment well [] Patient limited by fatique  []Patient limited by pain [] Patient limited by other medical complications  [] Other:     Goals:    Short term goals  Time

## 2020-03-13 ENCOUNTER — HOSPITAL ENCOUNTER (OUTPATIENT)
Dept: PHYSICAL THERAPY | Age: 60
Setting detail: THERAPIES SERIES
Discharge: HOME | End: 2020-03-13
Payer: MEDICARE

## 2020-03-13 PROCEDURE — 97110 THERAPEUTIC EXERCISES: CPT

## 2020-03-13 NOTE — FLOWSHEET NOTE
Physical Therapy Daily Treatment Note    Date:  3/13/2020    Patient Name:  Shana Cunningham    :  1960  MRN: [id number]  Restrictions/Precautions:    Medical/Treatment Diagnosis Information:  · Diagnosis: lumbar facet arthropathy, lumbar DDD, R ankle OA  Insurance/Certification information:  PT Insurance Information: [place]  Physician Information:  Referring Practitioner: Karlene Dela Cruz MD  Plan of care signed (Y/N):  N  Visit# / total visits:     G-Code (if applicable):          LEFS 15/80    Time in:   2:00    Timed Treatment: 30 Total Treatment Time:  30  ________________________________________________________________________________________    Pain Level:    /10  SUBJECTIVE:  Pt reports that she twisted her ankle last week and was unable to get around. OBJECTIVE: stabilization protocol    Exercise/Equipment Resistance/Repetitions Other comments   TG 5 min           TA set    BKF    Shelagh Mast 10x5\" hold with band at knees    Supine clams  SL hip ER with stabilization  Green TB   Prone H' neuro re-ed 4 min BLE IR/ER   3-way ball compression 3x30\"    Supine sliders     Supine lumbopelvic stabilization              Multif press x15 B Band at knees   Standing hip abduction x15 B 10#   S' extension x15 Band at knees   Lat walking green   Airex balance                                  Other Therapeutic Activities:  Pt educated on POC. Pt also educated on anatomy and biomechanics of lumbar spine, sacrum, pelvis, and neurodynamics of BLEs. Manual Treatments:   Inferior hip distraction grade IV BLE; breaking bread to lumbar spine    Modalities:      Test/Measurements:  See eval       ASSESSMENT:  Pt tolerated session well. Progress as pt tolerates.      Treatment/Activity Tolerance:   [x]Patient tolerated treatment well [] Patient limited by fatique  []Patient limited by pain [] Patient limited by other medical complications  [] Other:     Goals:    Short term goals  Time Frame for Short

## 2020-03-24 ENCOUNTER — HOSPITAL ENCOUNTER (OUTPATIENT)
Dept: PHYSICAL THERAPY | Age: 60
Setting detail: THERAPIES SERIES
End: 2020-03-24
Payer: MEDICARE

## 2020-03-27 ENCOUNTER — APPOINTMENT (OUTPATIENT)
Dept: PHYSICAL THERAPY | Age: 60
End: 2020-03-27
Payer: MEDICARE

## 2020-06-05 ENCOUNTER — HOSPITAL ENCOUNTER (INPATIENT)
Age: 60
LOS: 3 days | Discharge: HOME | DRG: 871 | End: 2020-06-08
Attending: EMERGENCY MEDICINE | Admitting: INTERNAL MEDICINE
Payer: MEDICARE

## 2020-06-05 ENCOUNTER — APPOINTMENT (OUTPATIENT)
Dept: GENERAL RADIOLOGY | Age: 60
DRG: 871 | End: 2020-06-05
Payer: MEDICARE

## 2020-06-05 PROBLEM — Z85.118 HISTORY OF LUNG CANCER: Status: ACTIVE | Noted: 2020-06-05

## 2020-06-05 PROBLEM — J45.909 REACTIVE AIRWAY DISEASE: Status: ACTIVE | Noted: 2020-06-05

## 2020-06-05 PROBLEM — M79.604 CHRONIC PAIN OF RIGHT LOWER EXTREMITY: Status: ACTIVE | Noted: 2020-06-05

## 2020-06-05 PROBLEM — J15.9 COMMUNITY ACQUIRED BACTERIAL PNEUMONIA: Status: ACTIVE | Noted: 2020-06-05

## 2020-06-05 PROBLEM — G89.29 CHRONIC PAIN OF RIGHT LOWER EXTREMITY: Status: ACTIVE | Noted: 2020-06-05

## 2020-06-05 LAB
A/G RATIO: 1.7 (ref 1.1–2.2)
ALBUMIN SERPL-MCNC: 4.4 G/DL (ref 3.4–5)
ALP BLD-CCNC: 56 U/L (ref 40–129)
ALT SERPL-CCNC: 29 U/L (ref 10–40)
ANION GAP SERPL CALCULATED.3IONS-SCNC: 10 MMOL/L (ref 3–16)
AST SERPL-CCNC: 31 U/L (ref 15–37)
BASOPHILS ABSOLUTE: 0 K/UL (ref 0–0.2)
BASOPHILS RELATIVE PERCENT: 0.2 %
BILIRUB SERPL-MCNC: 0.5 MG/DL (ref 0–1)
BILIRUBIN URINE: NEGATIVE
BLOOD, URINE: NEGATIVE
BUN BLDV-MCNC: 15 MG/DL (ref 7–20)
CALCIUM SERPL-MCNC: 10 MG/DL (ref 8.3–10.6)
CHLORIDE BLD-SCNC: 103 MMOL/L (ref 99–110)
CLARITY: CLEAR
CO2: 25 MMOL/L (ref 21–32)
COLOR: YELLOW
CREAT SERPL-MCNC: 0.8 MG/DL (ref 0.6–1.2)
EOSINOPHILS ABSOLUTE: 0 K/UL (ref 0–0.6)
EOSINOPHILS RELATIVE PERCENT: 0 %
GFR AFRICAN AMERICAN: >60
GFR NON-AFRICAN AMERICAN: >60
GLOBULIN: 2.6 G/DL
GLUCOSE BLD-MCNC: 120 MG/DL (ref 70–99)
GLUCOSE URINE: NEGATIVE MG/DL
HCT VFR BLD CALC: 35.6 % (ref 36–48)
HEMOGLOBIN: 12 G/DL (ref 12–16)
KETONES, URINE: ABNORMAL MG/DL
LACTIC ACID: 2.3 MMOL/L (ref 0.4–2)
LEUKOCYTE ESTERASE, URINE: NEGATIVE
LYMPHOCYTES ABSOLUTE: 0.8 K/UL (ref 1–5.1)
LYMPHOCYTES RELATIVE PERCENT: 5.1 %
MCH RBC QN AUTO: 29.5 PG (ref 26–34)
MCHC RBC AUTO-ENTMCNC: 33.8 G/DL (ref 31–36)
MCV RBC AUTO: 87.3 FL (ref 80–100)
MICROSCOPIC EXAMINATION: ABNORMAL
MONOCYTES ABSOLUTE: 0.9 K/UL (ref 0–1.3)
MONOCYTES RELATIVE PERCENT: 5.6 %
NEUTROPHILS ABSOLUTE: 14.7 K/UL (ref 1.7–7.7)
NEUTROPHILS RELATIVE PERCENT: 89.1 %
NITRITE, URINE: NEGATIVE
PDW BLD-RTO: 12.6 % (ref 12.4–15.4)
PH UA: 6 (ref 5–8)
PLATELET # BLD: 340 K/UL (ref 135–450)
PMV BLD AUTO: 6.8 FL (ref 5–10.5)
POTASSIUM SERPL-SCNC: 4.4 MMOL/L (ref 3.5–5.1)
PROCALCITONIN: 6.63 NG/ML (ref 0–0.15)
PROTEIN UA: NEGATIVE MG/DL
RBC # BLD: 4.07 M/UL (ref 4–5.2)
SODIUM BLD-SCNC: 138 MMOL/L (ref 136–145)
SPECIFIC GRAVITY UA: 1.02 (ref 1–1.03)
TOTAL PROTEIN: 7 G/DL (ref 6.4–8.2)
TROPONIN: <0.01 NG/ML
URINE REFLEX TO CULTURE: ABNORMAL
URINE TYPE: ABNORMAL
UROBILINOGEN, URINE: 0.2 E.U./DL
WBC # BLD: 16.5 K/UL (ref 4–11)

## 2020-06-05 PROCEDURE — 84484 ASSAY OF TROPONIN QUANT: CPT

## 2020-06-05 PROCEDURE — 6370000000 HC RX 637 (ALT 250 FOR IP): Performed by: INTERNAL MEDICINE

## 2020-06-05 PROCEDURE — 99291 CRITICAL CARE FIRST HOUR: CPT

## 2020-06-05 PROCEDURE — 6370000000 HC RX 637 (ALT 250 FOR IP): Performed by: EMERGENCY MEDICINE

## 2020-06-05 PROCEDURE — 80053 COMPREHEN METABOLIC PANEL: CPT

## 2020-06-05 PROCEDURE — 85025 COMPLETE CBC W/AUTO DIFF WBC: CPT

## 2020-06-05 PROCEDURE — 2580000003 HC RX 258: Performed by: EMERGENCY MEDICINE

## 2020-06-05 PROCEDURE — 1200000000 HC SEMI PRIVATE

## 2020-06-05 PROCEDURE — 81003 URINALYSIS AUTO W/O SCOPE: CPT

## 2020-06-05 PROCEDURE — 84145 PROCALCITONIN (PCT): CPT

## 2020-06-05 PROCEDURE — 83605 ASSAY OF LACTIC ACID: CPT

## 2020-06-05 PROCEDURE — 6360000002 HC RX W HCPCS: Performed by: EMERGENCY MEDICINE

## 2020-06-05 PROCEDURE — 96365 THER/PROPH/DIAG IV INF INIT: CPT

## 2020-06-05 PROCEDURE — 93005 ELECTROCARDIOGRAM TRACING: CPT | Performed by: EMERGENCY MEDICINE

## 2020-06-05 PROCEDURE — 71045 X-RAY EXAM CHEST 1 VIEW: CPT

## 2020-06-05 PROCEDURE — C9113 INJ PANTOPRAZOLE SODIUM, VIA: HCPCS | Performed by: EMERGENCY MEDICINE

## 2020-06-05 RX ORDER — POLYETHYLENE GLYCOL 3350 17 G/17G
17 POWDER, FOR SOLUTION ORAL DAILY PRN
Status: DISCONTINUED | OUTPATIENT
Start: 2020-06-05 | End: 2020-06-08 | Stop reason: HOSPADM

## 2020-06-05 RX ORDER — HYDROCODONE BITARTRATE AND ACETAMINOPHEN 10; 325 MG/1; MG/1
1 TABLET ORAL
COMMUNITY

## 2020-06-05 RX ORDER — CETIRIZINE HYDROCHLORIDE 10 MG/1
10 TABLET ORAL DAILY
COMMUNITY

## 2020-06-05 RX ORDER — ONDANSETRON 2 MG/ML
4 INJECTION INTRAMUSCULAR; INTRAVENOUS EVERY 6 HOURS PRN
Status: DISCONTINUED | OUTPATIENT
Start: 2020-06-05 | End: 2020-06-08 | Stop reason: HOSPADM

## 2020-06-05 RX ORDER — TRAZODONE HYDROCHLORIDE 50 MG/1
100 TABLET ORAL NIGHTLY
Status: DISCONTINUED | OUTPATIENT
Start: 2020-06-05 | End: 2020-06-08 | Stop reason: HOSPADM

## 2020-06-05 RX ORDER — MONTELUKAST SODIUM 10 MG/1
10 TABLET ORAL NIGHTLY
COMMUNITY

## 2020-06-05 RX ORDER — GABAPENTIN 300 MG/1
600 CAPSULE ORAL 3 TIMES DAILY
Status: DISCONTINUED | OUTPATIENT
Start: 2020-06-05 | End: 2020-06-08 | Stop reason: HOSPADM

## 2020-06-05 RX ORDER — HYDROCODONE BITARTRATE AND ACETAMINOPHEN 5; 325 MG/1; MG/1
2 TABLET ORAL ONCE
Status: DISCONTINUED | OUTPATIENT
Start: 2020-06-05 | End: 2020-06-05

## 2020-06-05 RX ORDER — MELOXICAM 7.5 MG/1
7.5 TABLET ORAL DAILY
COMMUNITY

## 2020-06-05 RX ORDER — TIZANIDINE 4 MG/1
4 TABLET ORAL EVERY 6 HOURS PRN
Status: DISCONTINUED | OUTPATIENT
Start: 2020-06-05 | End: 2020-06-08 | Stop reason: HOSPADM

## 2020-06-05 RX ORDER — ACETAMINOPHEN 325 MG/1
650 TABLET ORAL EVERY 6 HOURS PRN
Status: DISCONTINUED | OUTPATIENT
Start: 2020-06-05 | End: 2020-06-08 | Stop reason: HOSPADM

## 2020-06-05 RX ORDER — PROMETHAZINE HYDROCHLORIDE 25 MG/1
12.5 TABLET ORAL EVERY 6 HOURS PRN
Status: DISCONTINUED | OUTPATIENT
Start: 2020-06-05 | End: 2020-06-08 | Stop reason: HOSPADM

## 2020-06-05 RX ORDER — M-VIT,TX,IRON,MINS/CALC/FOLIC 27MG-0.4MG
1 TABLET ORAL DAILY
Status: DISCONTINUED | OUTPATIENT
Start: 2020-06-06 | End: 2020-06-08 | Stop reason: HOSPADM

## 2020-06-05 RX ORDER — IPRATROPIUM BROMIDE AND ALBUTEROL SULFATE 2.5; .5 MG/3ML; MG/3ML
1 SOLUTION RESPIRATORY (INHALATION) EVERY 4 HOURS PRN
Status: DISCONTINUED | OUTPATIENT
Start: 2020-06-05 | End: 2020-06-06

## 2020-06-05 RX ORDER — PANTOPRAZOLE SODIUM 40 MG/1
40 TABLET, DELAYED RELEASE ORAL
Status: DISCONTINUED | OUTPATIENT
Start: 2020-06-06 | End: 2020-06-08 | Stop reason: HOSPADM

## 2020-06-05 RX ORDER — CETIRIZINE HYDROCHLORIDE 10 MG/1
10 TABLET ORAL DAILY
Status: DISCONTINUED | OUTPATIENT
Start: 2020-06-06 | End: 2020-06-08 | Stop reason: HOSPADM

## 2020-06-05 RX ORDER — SODIUM CHLORIDE 0.9 % (FLUSH) 0.9 %
10 SYRINGE (ML) INJECTION EVERY 12 HOURS SCHEDULED
Status: DISCONTINUED | OUTPATIENT
Start: 2020-06-05 | End: 2020-06-08

## 2020-06-05 RX ORDER — 0.9 % SODIUM CHLORIDE 0.9 %
1000 INTRAVENOUS SOLUTION INTRAVENOUS ONCE
Status: DISCONTINUED | OUTPATIENT
Start: 2020-06-05 | End: 2020-06-05

## 2020-06-05 RX ORDER — PANTOPRAZOLE SODIUM 40 MG/10ML
40 INJECTION, POWDER, LYOPHILIZED, FOR SOLUTION INTRAVENOUS ONCE
Status: COMPLETED | OUTPATIENT
Start: 2020-06-05 | End: 2020-06-05

## 2020-06-05 RX ORDER — TIZANIDINE 4 MG/1
4 TABLET ORAL EVERY 6 HOURS PRN
COMMUNITY

## 2020-06-05 RX ORDER — 0.9 % SODIUM CHLORIDE 0.9 %
1000 INTRAVENOUS SOLUTION INTRAVENOUS ONCE
Status: COMPLETED | OUTPATIENT
Start: 2020-06-05 | End: 2020-06-05

## 2020-06-05 RX ORDER — OMEPRAZOLE 20 MG/1
20 CAPSULE, DELAYED RELEASE ORAL DAILY
COMMUNITY

## 2020-06-05 RX ORDER — HYDROCODONE BITARTRATE AND ACETAMINOPHEN 5; 325 MG/1; MG/1
2 TABLET ORAL ONCE
Status: COMPLETED | OUTPATIENT
Start: 2020-06-05 | End: 2020-06-05

## 2020-06-05 RX ORDER — AMITRIPTYLINE HYDROCHLORIDE 25 MG/1
25 TABLET, FILM COATED ORAL NIGHTLY
COMMUNITY

## 2020-06-05 RX ORDER — AMITRIPTYLINE HYDROCHLORIDE 25 MG/1
25 TABLET, FILM COATED ORAL NIGHTLY
Status: DISCONTINUED | OUTPATIENT
Start: 2020-06-05 | End: 2020-06-08 | Stop reason: HOSPADM

## 2020-06-05 RX ORDER — HYDROCODONE BITARTRATE AND ACETAMINOPHEN 10; 325 MG/1; MG/1
1 TABLET ORAL EVERY 4 HOURS PRN
Status: DISCONTINUED | OUTPATIENT
Start: 2020-06-05 | End: 2020-06-08 | Stop reason: HOSPADM

## 2020-06-05 RX ORDER — ACETAMINOPHEN 650 MG/1
650 SUPPOSITORY RECTAL EVERY 6 HOURS PRN
Status: DISCONTINUED | OUTPATIENT
Start: 2020-06-05 | End: 2020-06-08 | Stop reason: HOSPADM

## 2020-06-05 RX ORDER — PANTOPRAZOLE SODIUM 40 MG/10ML
40 INJECTION, POWDER, LYOPHILIZED, FOR SOLUTION INTRAVENOUS ONCE
Status: DISCONTINUED | OUTPATIENT
Start: 2020-06-05 | End: 2020-06-05

## 2020-06-05 RX ORDER — NICOTINE 21 MG/24HR
1 PATCH, TRANSDERMAL 24 HOURS TRANSDERMAL DAILY
Status: DISCONTINUED | OUTPATIENT
Start: 2020-06-05 | End: 2020-06-08 | Stop reason: HOSPADM

## 2020-06-05 RX ORDER — TRAZODONE HYDROCHLORIDE 100 MG/1
100 TABLET ORAL NIGHTLY
COMMUNITY

## 2020-06-05 RX ORDER — SODIUM CHLORIDE 0.9 % (FLUSH) 0.9 %
10 SYRINGE (ML) INJECTION PRN
Status: DISCONTINUED | OUTPATIENT
Start: 2020-06-05 | End: 2020-06-08

## 2020-06-05 RX ORDER — GABAPENTIN 400 MG/1
600 CAPSULE ORAL 3 TIMES DAILY
COMMUNITY

## 2020-06-05 RX ADMIN — AMITRIPTYLINE HYDROCHLORIDE 25 MG: 25 TABLET, FILM COATED ORAL at 23:35

## 2020-06-05 RX ADMIN — SODIUM CHLORIDE 1000 ML: 9 INJECTION, SOLUTION INTRAVENOUS at 19:36

## 2020-06-05 RX ADMIN — TRAZODONE HYDROCHLORIDE 100 MG: 50 TABLET ORAL at 23:35

## 2020-06-05 RX ADMIN — SODIUM CHLORIDE 1000 ML: 9 INJECTION, SOLUTION INTRAVENOUS at 20:06

## 2020-06-05 RX ADMIN — PANTOPRAZOLE SODIUM 40 MG: 40 INJECTION, POWDER, FOR SOLUTION INTRAVENOUS at 20:52

## 2020-06-05 RX ADMIN — CEFTRIAXONE SODIUM 1 G: 1 INJECTION, POWDER, FOR SOLUTION INTRAMUSCULAR; INTRAVENOUS at 20:06

## 2020-06-05 RX ADMIN — HYDROCODONE BITARTRATE AND ACETAMINOPHEN 2 TABLET: 5; 325 TABLET ORAL at 20:26

## 2020-06-05 RX ADMIN — AZITHROMYCIN MONOHYDRATE 500 MG: 500 INJECTION, POWDER, LYOPHILIZED, FOR SOLUTION INTRAVENOUS at 20:39

## 2020-06-05 RX ADMIN — IPRATROPIUM BROMIDE AND ALBUTEROL SULFATE 1 AMPULE: .5; 3 SOLUTION RESPIRATORY (INHALATION) at 23:58

## 2020-06-05 ASSESSMENT — PAIN SCALES - GENERAL
PAINLEVEL_OUTOF10: 8
PAINLEVEL_OUTOF10: 3
PAINLEVEL_OUTOF10: 0
PAINLEVEL_OUTOF10: 3

## 2020-06-05 ASSESSMENT — PAIN DESCRIPTION - PAIN TYPE: TYPE: ACUTE PAIN

## 2020-06-05 ASSESSMENT — PAIN DESCRIPTION - LOCATION: LOCATION: CHEST

## 2020-06-05 NOTE — ED PROVIDER NOTES
CHIEF COMPLAINT  Chest Pain (PT has been having chest pain and right arm flailing for x1.5 days. ) and Shaking      HISTORY OF PRESENT ILLNESS  Dipika Trevizo is a 60 y.o. female who presents to the ED with shortness of breath and right-sided chest pain and also some right arm jitteriness. He also has a history of shakiness in the left arm and recently had an EMG but does not know the results. No other complaints, modifying factors or associated symptoms. I have reviewed the following from the nursing documentation.     Past Medical History:   Diagnosis Date    ADHD (attention deficit hyperactivity disorder)     Arthritis     Recurrent sinus infections     Wears glasses     Wears partial dentures     implants on bottom to hold partial / partial on top also     Past Surgical History:   Procedure Laterality Date    ANKLE FRACTURE SURGERY Right 8/4/14    CARPAL TUNNEL RELEASE      right    DENTAL SURGERY      implants    FOOT SURGERY Left     x2    KNEE ARTHROSCOPY      left    NOSE SURGERY      implant placed    NOSE SURGERY      1 nasal implant removed    TUBAL LIGATION       Family History   Adopted: Yes   Problem Relation Age of Onset    Stroke Father      Social History     Socioeconomic History    Marital status: Life Partner     Spouse name: Not on file    Number of children: Not on file    Years of education: Not on file    Highest education level: Not on file   Occupational History    Not on file   Social Needs    Financial resource strain: Not on file    Food insecurity     Worry: Not on file     Inability: Not on file   [employer] needs     Medical: Not on file     Non-medical: Not on file   Tobacco Use    Smoking status: Former Smoker     Packs/day: 1.00     Years: 35.00     Pack years: 35.00     Types: Cigarettes    Smokeless tobacco: Never Used   Substance and Sexual Activity    Alcohol use: Yes     Comment: DRINKS A 5TH A DAY  NONE FOR PAST WK    Drug use: No    Sexual m), weight 145 lb (65.8 kg), last menstrual period 09/22/2011, SpO2 98 %. DISPOSITION  Marcelo Poole was admitted in stable condition. This chart was generated in part by using Dragon Dictation system and may contain errors related to that system including errors in grammar, punctuation, and spelling, as well as words and phrases that may be inappropriate. When dictating, effort is made to correct spelling/grammar errors. If there are any questions or concerns please feel free to contact the dictating provider for clarification.      Bradly Mcgee DO  ATTENDING, EMERGENCY MEDICINE        Johanny Montenegro, DO  06/05/20 Paulette Carpio 38 V, DO  06/05/20 2027

## 2020-06-06 ENCOUNTER — APPOINTMENT (OUTPATIENT)
Dept: INTERVENTIONAL RADIOLOGY/VASCULAR | Age: 60
DRG: 871 | End: 2020-06-06
Payer: MEDICARE

## 2020-06-06 ENCOUNTER — APPOINTMENT (OUTPATIENT)
Dept: CT IMAGING | Age: 60
DRG: 871 | End: 2020-06-06
Payer: MEDICARE

## 2020-06-06 PROBLEM — F17.200 TOBACCO USE DISORDER: Status: ACTIVE | Noted: 2020-06-06

## 2020-06-06 LAB
ANION GAP SERPL CALCULATED.3IONS-SCNC: 7 MMOL/L (ref 3–16)
BASOPHILS ABSOLUTE: 0 K/UL (ref 0–0.2)
BASOPHILS RELATIVE PERCENT: 0.3 %
BUN BLDV-MCNC: 10 MG/DL (ref 7–20)
CALCIUM SERPL-MCNC: 8.8 MG/DL (ref 8.3–10.6)
CHLORIDE BLD-SCNC: 111 MMOL/L (ref 99–110)
CO2: 23 MMOL/L (ref 21–32)
CREAT SERPL-MCNC: 0.6 MG/DL (ref 0.6–1.2)
EKG ATRIAL RATE: 89 BPM
EKG DIAGNOSIS: NORMAL
EKG P AXIS: 38 DEGREES
EKG P-R INTERVAL: 168 MS
EKG Q-T INTERVAL: 358 MS
EKG QRS DURATION: 84 MS
EKG QTC CALCULATION (BAZETT): 435 MS
EKG R AXIS: 31 DEGREES
EKG T AXIS: 32 DEGREES
EKG VENTRICULAR RATE: 89 BPM
EOSINOPHILS ABSOLUTE: 0 K/UL (ref 0–0.6)
EOSINOPHILS RELATIVE PERCENT: 0.1 %
GFR AFRICAN AMERICAN: >60
GFR NON-AFRICAN AMERICAN: >60
GLUCOSE BLD-MCNC: 108 MG/DL (ref 70–99)
HCT VFR BLD CALC: 30 % (ref 36–48)
HEMOGLOBIN: 10.2 G/DL (ref 12–16)
LACTIC ACID: 2.6 MMOL/L (ref 0.4–2)
LACTIC ACID: 3.6 MMOL/L (ref 0.4–2)
LYMPHOCYTES ABSOLUTE: 1.2 K/UL (ref 1–5.1)
LYMPHOCYTES RELATIVE PERCENT: 7.8 %
MAGNESIUM: 1.8 MG/DL (ref 1.8–2.4)
MCH RBC QN AUTO: 30 PG (ref 26–34)
MCHC RBC AUTO-ENTMCNC: 33.9 G/DL (ref 31–36)
MCV RBC AUTO: 88.5 FL (ref 80–100)
MONOCYTES ABSOLUTE: 0.7 K/UL (ref 0–1.3)
MONOCYTES RELATIVE PERCENT: 4.7 %
NEUTROPHILS ABSOLUTE: 13.5 K/UL (ref 1.7–7.7)
NEUTROPHILS RELATIVE PERCENT: 87.1 %
PDW BLD-RTO: 12.9 % (ref 12.4–15.4)
PHOSPHORUS: 1.7 MG/DL (ref 2.5–4.9)
PLATELET # BLD: 296 K/UL (ref 135–450)
PMV BLD AUTO: 7 FL (ref 5–10.5)
POTASSIUM SERPL-SCNC: 3.5 MMOL/L (ref 3.5–5.1)
RBC # BLD: 3.39 M/UL (ref 4–5.2)
SODIUM BLD-SCNC: 141 MMOL/L (ref 136–145)
TROPONIN: <0.01 NG/ML
WBC # BLD: 15.5 K/UL (ref 4–11)

## 2020-06-06 PROCEDURE — 84484 ASSAY OF TROPONIN QUANT: CPT

## 2020-06-06 PROCEDURE — 2500000003 HC RX 250 WO HCPCS: Performed by: INTERNAL MEDICINE

## 2020-06-06 PROCEDURE — 6360000002 HC RX W HCPCS: Performed by: INTERNAL MEDICINE

## 2020-06-06 PROCEDURE — U0003 INFECTIOUS AGENT DETECTION BY NUCLEIC ACID (DNA OR RNA); SEVERE ACUTE RESPIRATORY SYNDROME CORONAVIRUS 2 (SARS-COV-2) (CORONAVIRUS DISEASE [COVID-19]), AMPLIFIED PROBE TECHNIQUE, MAKING USE OF HIGH THROUGHPUT TECHNOLOGIES AS DESCRIBED BY CMS-2020-01-R: HCPCS

## 2020-06-06 PROCEDURE — 6370000000 HC RX 637 (ALT 250 FOR IP): Performed by: INTERNAL MEDICINE

## 2020-06-06 PROCEDURE — 2580000003 HC RX 258: Performed by: NURSE PRACTITIONER

## 2020-06-06 PROCEDURE — 87449 NOS EACH ORGANISM AG IA: CPT

## 2020-06-06 PROCEDURE — 36415 COLL VENOUS BLD VENIPUNCTURE: CPT

## 2020-06-06 PROCEDURE — 94640 AIRWAY INHALATION TREATMENT: CPT

## 2020-06-06 PROCEDURE — 80048 BASIC METABOLIC PNL TOTAL CA: CPT

## 2020-06-06 PROCEDURE — 85025 COMPLETE CBC W/AUTO DIFF WBC: CPT

## 2020-06-06 PROCEDURE — C1751 CATH, INF, PER/CENT/MIDLINE: HCPCS

## 2020-06-06 PROCEDURE — 2580000003 HC RX 258: Performed by: INTERNAL MEDICINE

## 2020-06-06 PROCEDURE — 93010 ELECTROCARDIOGRAM REPORT: CPT | Performed by: INTERNAL MEDICINE

## 2020-06-06 PROCEDURE — 36573 INSJ PICC RS&I 5 YR+: CPT

## 2020-06-06 PROCEDURE — 87040 BLOOD CULTURE FOR BACTERIA: CPT

## 2020-06-06 PROCEDURE — 71250 CT THORAX DX C-: CPT

## 2020-06-06 PROCEDURE — 83735 ASSAY OF MAGNESIUM: CPT

## 2020-06-06 PROCEDURE — 83605 ASSAY OF LACTIC ACID: CPT

## 2020-06-06 PROCEDURE — 02HV33Z INSERTION OF INFUSION DEVICE INTO SUPERIOR VENA CAVA, PERCUTANEOUS APPROACH: ICD-10-PCS | Performed by: INTERNAL MEDICINE

## 2020-06-06 PROCEDURE — 94150 VITAL CAPACITY TEST: CPT

## 2020-06-06 PROCEDURE — 1200000000 HC SEMI PRIVATE

## 2020-06-06 PROCEDURE — 84100 ASSAY OF PHOSPHORUS: CPT

## 2020-06-06 RX ORDER — LIDOCAINE HYDROCHLORIDE 10 MG/ML
5 INJECTION, SOLUTION INFILTRATION; PERINEURAL ONCE
Status: COMPLETED | OUTPATIENT
Start: 2020-06-06 | End: 2020-06-06

## 2020-06-06 RX ORDER — SODIUM CHLORIDE 0.9 % (FLUSH) 0.9 %
10 SYRINGE (ML) INJECTION PRN
Status: DISCONTINUED | OUTPATIENT
Start: 2020-06-06 | End: 2020-06-08 | Stop reason: HOSPADM

## 2020-06-06 RX ORDER — IPRATROPIUM BROMIDE AND ALBUTEROL SULFATE 2.5; .5 MG/3ML; MG/3ML
1 SOLUTION RESPIRATORY (INHALATION) EVERY 4 HOURS PRN
Status: DISCONTINUED | OUTPATIENT
Start: 2020-06-06 | End: 2020-06-08 | Stop reason: HOSPADM

## 2020-06-06 RX ORDER — SODIUM CHLORIDE 9 MG/ML
INJECTION, SOLUTION INTRAVENOUS CONTINUOUS
Status: DISCONTINUED | OUTPATIENT
Start: 2020-06-06 | End: 2020-06-08

## 2020-06-06 RX ORDER — IPRATROPIUM BROMIDE AND ALBUTEROL SULFATE 2.5; .5 MG/3ML; MG/3ML
1 SOLUTION RESPIRATORY (INHALATION)
Status: DISCONTINUED | OUTPATIENT
Start: 2020-06-06 | End: 2020-06-06

## 2020-06-06 RX ORDER — SODIUM CHLORIDE 0.9 % (FLUSH) 0.9 %
10 SYRINGE (ML) INJECTION EVERY 12 HOURS SCHEDULED
Status: DISCONTINUED | OUTPATIENT
Start: 2020-06-06 | End: 2020-06-08 | Stop reason: HOSPADM

## 2020-06-06 RX ORDER — 0.9 % SODIUM CHLORIDE 0.9 %
1000 INTRAVENOUS SOLUTION INTRAVENOUS ONCE
Status: COMPLETED | OUTPATIENT
Start: 2020-06-06 | End: 2020-06-06

## 2020-06-06 RX ADMIN — CETIRIZINE HYDROCHLORIDE 10 MG: 10 TABLET, FILM COATED ORAL at 10:14

## 2020-06-06 RX ADMIN — IPRATROPIUM BROMIDE AND ALBUTEROL SULFATE 1 AMPULE: .5; 3 SOLUTION RESPIRATORY (INHALATION) at 11:38

## 2020-06-06 RX ADMIN — SODIUM CHLORIDE: 9 INJECTION, SOLUTION INTRAVENOUS at 02:53

## 2020-06-06 RX ADMIN — SODIUM CHLORIDE 1000 ML: 9 INJECTION, SOLUTION INTRAVENOUS at 00:42

## 2020-06-06 RX ADMIN — IPRATROPIUM BROMIDE AND ALBUTEROL SULFATE 1 AMPULE: .5; 3 SOLUTION RESPIRATORY (INHALATION) at 08:03

## 2020-06-06 RX ADMIN — CEFTRIAXONE SODIUM 1 G: 1 INJECTION, POWDER, FOR SOLUTION INTRAMUSCULAR; INTRAVENOUS at 22:14

## 2020-06-06 RX ADMIN — LIDOCAINE HYDROCHLORIDE 2 ML: 10 INJECTION, SOLUTION EPIDURAL; INFILTRATION; INTRACAUDAL; PERINEURAL at 15:30

## 2020-06-06 RX ADMIN — HYDROCODONE BITARTRATE AND ACETAMINOPHEN 1 TABLET: 10; 325 TABLET ORAL at 22:13

## 2020-06-06 RX ADMIN — ENOXAPARIN SODIUM 40 MG: 40 INJECTION SUBCUTANEOUS at 10:14

## 2020-06-06 RX ADMIN — AMITRIPTYLINE HYDROCHLORIDE 25 MG: 25 TABLET, FILM COATED ORAL at 20:38

## 2020-06-06 RX ADMIN — GABAPENTIN 600 MG: 300 CAPSULE ORAL at 14:53

## 2020-06-06 RX ADMIN — POTASSIUM PHOSPHATE, MONOBASIC AND POTASSIUM PHOSPHATE, DIBASIC 20 MMOL: 224; 236 INJECTION, SOLUTION, CONCENTRATE INTRAVENOUS at 18:16

## 2020-06-06 RX ADMIN — PANTOPRAZOLE SODIUM 40 MG: 40 TABLET, DELAYED RELEASE ORAL at 06:03

## 2020-06-06 RX ADMIN — SALINE NASAL SPRAY 1 SPRAY: 1.5 SOLUTION NASAL at 04:46

## 2020-06-06 RX ADMIN — ACETAMINOPHEN 650 MG: 325 TABLET ORAL at 15:01

## 2020-06-06 RX ADMIN — GABAPENTIN 600 MG: 300 CAPSULE ORAL at 10:14

## 2020-06-06 RX ADMIN — CEFTRIAXONE SODIUM 1 G: 1 INJECTION, POWDER, FOR SOLUTION INTRAMUSCULAR; INTRAVENOUS at 10:13

## 2020-06-06 RX ADMIN — HYDROCODONE BITARTRATE AND ACETAMINOPHEN 1 TABLET: 10; 325 TABLET ORAL at 02:49

## 2020-06-06 RX ADMIN — AZITHROMYCIN MONOHYDRATE 500 MG: 500 INJECTION, POWDER, LYOPHILIZED, FOR SOLUTION INTRAVENOUS at 10:13

## 2020-06-06 RX ADMIN — GABAPENTIN 600 MG: 300 CAPSULE ORAL at 20:38

## 2020-06-06 RX ADMIN — TRAZODONE HYDROCHLORIDE 100 MG: 50 TABLET ORAL at 20:40

## 2020-06-06 RX ADMIN — Medication 1 TABLET: at 10:14

## 2020-06-06 ASSESSMENT — PAIN SCALES - GENERAL
PAINLEVEL_OUTOF10: 6
PAINLEVEL_OUTOF10: 8
PAINLEVEL_OUTOF10: 0
PAINLEVEL_OUTOF10: 6
PAINLEVEL_OUTOF10: 0

## 2020-06-06 NOTE — PROGRESS NOTES
Hospitalist Progress Note      PCP: Gali Lebron MD    Date of Admission: 6/5/2020    Chief Complaint: fatigue, cp     Hospital Course:     61 yo F with history of lung ca s/p resection presented with R sided chest pain, sob, fatigue, chills. Subjective:     Feels better today, less weak. Sob mildly improved. No n/v/c/d. Medications:  Reviewed    Infusion Medications    sodium chloride 100 mL/hr at 06/06/20 0253     Scheduled Medications    ipratropium-albuterol  1 ampule Inhalation Q4H WA    potassium phosphate IVPB  20 mmol Intravenous Once    lidocaine 1 % injection  5 mL Intradermal Once    sodium chloride flush  10 mL Intravenous 2 times per day    sodium chloride flush  10 mL Intravenous 2 times per day    enoxaparin  40 mg Subcutaneous Daily    gabapentin  600 mg Oral TID    amitriptyline  25 mg Oral Nightly    traZODone  100 mg Oral Nightly    cetirizine  10 mg Oral Daily    therapeutic multivitamin-minerals  1 tablet Oral Daily    pantoprazole  40 mg Oral QAM AC    conjugated estrogens   Vaginal Daily    nicotine  1 patch Transdermal Daily    cefTRIAXone (ROCEPHIN) IV  1 g Intravenous Q12H     PRN Meds: sodium chloride flush, sodium chloride flush, acetaminophen **OR** acetaminophen, polyethylene glycol, promethazine **OR** ondansetron, HYDROcodone-acetaminophen, sodium chloride, tiZANidine, melatonin ER      Intake/Output Summary (Last 24 hours) at 6/6/2020 1345  Last data filed at 6/6/2020 0800  Gross per 24 hour   Intake 2290 ml   Output --   Net 2290 ml       Physical Exam Performed:    /81   Pulse 103   Temp 98.4 °F (36.9 °C) (Oral)   Resp 14   Ht 5' 2\" (1.575 m)   Wt 145 lb (65.8 kg)   LMP 09/22/2011   SpO2 96%   BMI 26.52 kg/m²     General appearance: No apparent distress, appears stated age and cooperative. HEENT: Pupils equal, round, and reactive to light. Conjunctivae/corneas clear. Neck: Supple, with full range of motion.  No jugular venous

## 2020-06-06 NOTE — PROGRESS NOTES
Notified Ludivina Valdez NP, of lactic 3.6. Per NP, bolus, continuous fluids, lactic ordered - see new orders.

## 2020-06-06 NOTE — PROGRESS NOTES
1 nasal implant removed    TUBAL LIGATION         Level of Consciousness: Alert, Oriented, and Cooperative = 0    Level of Activity: Walking with assistance = 1    Respiratory Pattern: Dyspnea with exertion;Irregular pattern;or RR less than 6 = 2    Breath Sounds: Diminshed bilaterally and/or crackles = 2    Sputum   ,  ,    Cough: Strong, spontaneous, non-productive = 0    Vital Signs   /75   Pulse 93   Temp 98.3 °F (36.8 °C) (Oral)   Resp 18   Ht 5' 2\" (1.575 m)   Wt 145 lb (65.8 kg)   LMP 09/22/2011   SpO2 97%   BMI 26.52 kg/m²   SPO2 (COPD values may differ): Greater than or equal to 92% on room air = 0    Peak Flow (asthma only): not applicable = 0    RSI: 7-8 = BID and Q4HPRN (every four hours as needed) for dyspnea        Plan       Goals: medication delivery, mobilize retained secretions, volume expansion and improve oxygenation    Patient/caregiver was educated on the proper method of use for Respiratory Care Devices:  Yes      Level of patient/caregiver understanding able to:   ? Verbalize understanding   ? Demonstrate understanding       ? Teach back        ? Needs reinforcement       ? No available caregiver               ? Other:     Response to education:  Good     Is patient being placed on Home Treatment Regimen? No     Does the patient have everything they need prior to discharge? NA     Comments: Patient admits with chest pain and shortness of breath. Plan of Care: Vic Camara Q4WA    Electronically signed by Hafsa Hammer RCP on 6/6/2020 at 12:31 AM    Respiratory Protocol Guidelines     1. Assessment and treatment by Respiratory Therapy will be initiated for medication and therapeutic interventions upon initiation of aerosolized medication. 2. Physician will be contacted for respiratory rate (RR) greater than 35 breaths per minute. Therapy will be held for heart rate (HR) greater than 140 beats per minute, pending direction from physician.   3. Bronchodilators will be administered via Metered Dose Inhaler (MDI) with spacer when the following criteria are met:  a. Alert and cooperative     b. HR < 140 bpm  c. RR < 30 bpm                d. Can demonstrate a 2-3 second inspiratory hold  4. Bronchodilators will be administered via Hand Held Nebulizer JIN[facility]) to patients when ANY of the following criteria are met  a. Incognizant or uncooperative          b. Patients treated with HHN at Home        c. Unable to demonstrate proper use of MDI with spacer     d. RR > 30 bpm   5. Bronchodilators will be delivered via Metered Dose Inhaler (MDI), HHN, Aerogen to intubated patients on mechanical ventilation. 6. Inhalation medication orders will be delivered and/or substituted as outlined below. Aerosolized Medications Ordering and Administration Guidelines:    1. All Medications will be ordered by a physician, and their frequency and/or modality will be adjusted as defined by the patients Respiratory Severity Index (RSI) score. 2. If the patient does not have documented COPD, consider discontinuing anticholinergics when RSI is less than 9.  3. If the bronchospasm worsens (increased RSI), then the bronchodilator frequency can be increased to a maximum of every 4 hours. If greater than every 4 hours is required, the physician will be contacted. 4. If the bronchospasm improves, the frequency of the bronchodilator can be decreased, based on the patient's RSI, but not less than home treatment regimen frequency. 5. Bronchodilator(s) will be discontinued if patient has a RSI less than 9 and has received no scheduled or as needed treatment for 72  Hrs. Patients Ordered on a Mucolytic Agent:    1. Must always be administered with a bronchodilator. 2. Discontinue if patient experiences worsened bronchospasm, or secretions have lessened to the point that the patient is able to clear them with a cough. Anti-inflammatory and Combination Medications:    1.  If the patient lacks prior history

## 2020-06-06 NOTE — ED NOTES
Bedside report given to Linsey Anders RN. Pt in route to C5 at this time via wheelchair. Pt left in stable condition.      Thomas Hall RN  06/05/20 1281

## 2020-06-06 NOTE — PROGRESS NOTES
4 Eyes Skin Assessment     The patient is being assess for   Admission    I agree that 2 RN's have performed a thorough Head to Toe Skin Assessment on the patient. ALL assessment sites listed below have been assessed. Areas assessed by both nurses:   [x]   Head, Face, and Ears   [x]   Shoulders, Back, and Chest, Abdomen  [x]   Arms, Elbows, and Hands   [x]   Coccyx, Sacrum, and Ischium  [x]   Legs, Feet, and Heels            **SHARE this note so that the co-signing nurse is able to place an eSignature**    Co-signer eSignature: Electronically signed by Sahil Abel RN on 6/6/20 at 6:01 AM EDT    Does the Patient have Skin Breakdown?   No          Osmany Prevention initiated:  NA   Wound Care Orders initiated:  NA      [facility] nurse consulted for Pressure Injury (Stage 3,4, Unstageable, DTI, NWPT, Complex wounds)and New or Established Ostomies:  NA      Primary Nurse eSignature: Electronically signed by Guy Goldberg RN on 6/6/20 at 1:05 AM EDT

## 2020-06-06 NOTE — H&P
Hospital Medicine History & Physical      PCP: Sven Camarena MD    Date of Admission: 6/5/2020    Date of Service: Pt seen/examined on 06/05/20 and Admitted to Inpatient with expected LOS greater than two midnights due to medical therapy. Chief Complaint:  Fatigue, chest pain      History Of Present Illness: Misael Morris is 60 y.o. female who presented with complaint of chest pain. Symptom onset was acute for a time period of 2 days. The severity is described as severe. The course of his symptoms over time is worsening. The symptoms improved with rest and worsened with deep breathing. The patient's symptom is associated with SOB and chills. Misael Morris is 60 y.o. female with history of lung cancer s/p resection 5 years ago  She also has chronic right leg pain following a trauma, fracture, rods and screw  She presents to the ER with complaint of right sided chest pain, SOB, fatigue and chills. She states that her right arm became shaky and spilled her coffee. In the ED, wbc was elevated and CXR suggest new pneumonia. Past Medical History:          Diagnosis Date    ADHD (attention deficit hyperactivity disorder)     Arthritis     Asthma     Cancer ([facility] [address]     History of blood transfusion     Recurrent sinus infections     Wears glasses     Wears partial dentures     implants on bottom to hold partial / partial on top also       Past Surgical History:          Procedure Laterality Date    ANKLE FRACTURE SURGERY Right 8/4/14    CARPAL TUNNEL RELEASE      right    COSMETIC SURGERY      DENTAL SURGERY      implants    FOOT SURGERY Left     x2    FRACTURE SURGERY      KNEE ARTHROSCOPY      left    NOSE SURGERY      implant placed    NOSE SURGERY      1 nasal implant removed    TUBAL LIGATION         Medications Prior to Admission:      Prior to Admission medications    Medication Sig Start Date End Date Taking?  Authorizing Provider   diclofenac sodium 1 % GEL Apply 2 g SYSTEMS:   Pertinent positives as noted in the HPI. All other systems reviewed and negative. PHYSICAL EXAM PERFORMED:    /73   Pulse 94   Temp 98.5 °F (36.9 °C) (Oral)   Resp 20   Ht 5' 2\" (1.575 m)   Wt 145 lb (65.8 kg)   LMP 09/22/2011   SpO2 91%   BMI 26.52 kg/m²     General appearance:  No apparent distress, appears stated age and cooperative. HEENT:  Normal cephalic, atraumatic without obvious deformity. Pupils equal, round, and reactive to light. Extra ocular muscles intact. Conjunctivae/corneas clear. Neck: Supple, with full range of motion. No jugular venous distention. Trachea midline. Respiratory:  Normal respiratory effort. Clear to auscultation, bilaterally without Rales/Wheezes/Rhonchi. Cardiovascular:  Regular rate and rhythm with normal S1/S2 without murmurs, rubs or gallops. Abdomen: Soft, non-tender, non-distended with normal bowel sounds. Musculoskeletal:  No clubbing, cyanosis or edema bilaterally. Full range of motion without deformity. Skin: Skin color, texture, turgor normal.  No rashes or lesions. Neurologic:  Neurovascularly intact without any focal sensory/motor deficits. Cranial nerves: II-XII intact, grossly non-focal.  Psychiatric:  Alert and oriented, thought content appropriate, normal insight  Capillary Refill: Brisk,< 3 seconds   Peripheral Pulses: +2 palpable, equal bilaterally       Labs:     Recent Labs     06/05/20  1841   WBC 16.5*   HGB 12.0   HCT 35.6*        Recent Labs     06/05/20  1841      K 4.4      CO2 25   BUN 15   CREATININE 0.8   CALCIUM 10.0     Recent Labs     06/05/20  1841   AST 31   ALT 29   BILITOT 0.5   ALKPHOS 56     No results for input(s): INR in the last 72 hours.   Recent Labs     06/05/20  1841   TROPONINI <0.01       Urinalysis:      Lab Results   Component Value Date    NITRU Negative 06/05/2020    WBCUA 3-5 08/30/2018    BACTERIA 3+ 08/30/2018    RBCUA 0-2 08/30/2018    BLOODU Negative 06/05/2020    SPECGRAV

## 2020-06-07 LAB
BASOPHILS ABSOLUTE: 0.1 K/UL (ref 0–0.2)
BASOPHILS RELATIVE PERCENT: 0.4 %
EOSINOPHILS ABSOLUTE: 0.1 K/UL (ref 0–0.6)
EOSINOPHILS RELATIVE PERCENT: 1 %
HCT VFR BLD CALC: 29.5 % (ref 36–48)
HEMOGLOBIN: 10 G/DL (ref 12–16)
LYMPHOCYTES ABSOLUTE: 1 K/UL (ref 1–5.1)
LYMPHOCYTES RELATIVE PERCENT: 8.1 %
MCH RBC QN AUTO: 29.9 PG (ref 26–34)
MCHC RBC AUTO-ENTMCNC: 33.9 G/DL (ref 31–36)
MCV RBC AUTO: 88.2 FL (ref 80–100)
MONOCYTES ABSOLUTE: 0.6 K/UL (ref 0–1.3)
MONOCYTES RELATIVE PERCENT: 5.1 %
NEUTROPHILS ABSOLUTE: 10.8 K/UL (ref 1.7–7.7)
NEUTROPHILS RELATIVE PERCENT: 85.4 %
PDW BLD-RTO: 13.2 % (ref 12.4–15.4)
PLATELET # BLD: 310 K/UL (ref 135–450)
PMV BLD AUTO: 7 FL (ref 5–10.5)
RBC # BLD: 3.34 M/UL (ref 4–5.2)
STREP PNEUMONIAE ANTIGEN, URINE: NORMAL
WBC # BLD: 12.7 K/UL (ref 4–11)

## 2020-06-07 PROCEDURE — 85025 COMPLETE CBC W/AUTO DIFF WBC: CPT

## 2020-06-07 PROCEDURE — 6360000002 HC RX W HCPCS: Performed by: INTERNAL MEDICINE

## 2020-06-07 PROCEDURE — 6370000000 HC RX 637 (ALT 250 FOR IP): Performed by: INTERNAL MEDICINE

## 2020-06-07 PROCEDURE — 1200000000 HC SEMI PRIVATE

## 2020-06-07 PROCEDURE — 2580000003 HC RX 258: Performed by: INTERNAL MEDICINE

## 2020-06-07 RX ORDER — OXYCODONE HYDROCHLORIDE AND ACETAMINOPHEN 5; 325 MG/1; MG/1
1 TABLET ORAL EVERY 6 HOURS PRN
Status: DISCONTINUED | OUTPATIENT
Start: 2020-06-07 | End: 2020-06-08 | Stop reason: HOSPADM

## 2020-06-07 RX ORDER — AMLODIPINE BESYLATE 5 MG/1
10 TABLET ORAL DAILY
Status: DISCONTINUED | OUTPATIENT
Start: 2020-06-07 | End: 2020-06-08 | Stop reason: HOSPADM

## 2020-06-07 RX ADMIN — GABAPENTIN 600 MG: 300 CAPSULE ORAL at 09:55

## 2020-06-07 RX ADMIN — AMITRIPTYLINE HYDROCHLORIDE 25 MG: 25 TABLET, FILM COATED ORAL at 20:04

## 2020-06-07 RX ADMIN — CETIRIZINE HYDROCHLORIDE 10 MG: 10 TABLET, FILM COATED ORAL at 09:55

## 2020-06-07 RX ADMIN — AMLODIPINE BESYLATE 10 MG: 5 TABLET ORAL at 17:50

## 2020-06-07 RX ADMIN — Medication 1 TABLET: at 09:55

## 2020-06-07 RX ADMIN — ENOXAPARIN SODIUM 40 MG: 40 INJECTION SUBCUTANEOUS at 09:55

## 2020-06-07 RX ADMIN — OXYCODONE AND ACETAMINOPHEN 1 TABLET: 5; 325 TABLET ORAL at 17:50

## 2020-06-07 RX ADMIN — GABAPENTIN 600 MG: 300 CAPSULE ORAL at 20:05

## 2020-06-07 RX ADMIN — CEFTRIAXONE SODIUM 1 G: 1 INJECTION, POWDER, FOR SOLUTION INTRAMUSCULAR; INTRAVENOUS at 09:53

## 2020-06-07 RX ADMIN — TRAZODONE HYDROCHLORIDE 100 MG: 50 TABLET ORAL at 20:07

## 2020-06-07 RX ADMIN — HYDROCODONE BITARTRATE AND ACETAMINOPHEN 1 TABLET: 10; 325 TABLET ORAL at 09:59

## 2020-06-07 RX ADMIN — ONDANSETRON 4 MG: 2 INJECTION INTRAMUSCULAR; INTRAVENOUS at 20:07

## 2020-06-07 RX ADMIN — CEFTRIAXONE SODIUM 1 G: 1 INJECTION, POWDER, FOR SOLUTION INTRAMUSCULAR; INTRAVENOUS at 20:05

## 2020-06-07 RX ADMIN — GABAPENTIN 600 MG: 300 CAPSULE ORAL at 15:57

## 2020-06-07 RX ADMIN — PANTOPRAZOLE SODIUM 40 MG: 40 TABLET, DELAYED RELEASE ORAL at 06:37

## 2020-06-07 ASSESSMENT — PAIN SCALES - GENERAL
PAINLEVEL_OUTOF10: 0
PAINLEVEL_OUTOF10: 6
PAINLEVEL_OUTOF10: 0

## 2020-06-07 ASSESSMENT — PAIN DESCRIPTION - DESCRIPTORS: DESCRIPTORS: ACHING;DISCOMFORT

## 2020-06-07 ASSESSMENT — PAIN DESCRIPTION - PAIN TYPE: TYPE: ACUTE PAIN

## 2020-06-07 ASSESSMENT — PAIN DESCRIPTION - PROGRESSION
CLINICAL_PROGRESSION: NOT CHANGED
CLINICAL_PROGRESSION: NOT CHANGED

## 2020-06-07 ASSESSMENT — PAIN DESCRIPTION - ONSET: ONSET: ON-GOING

## 2020-06-07 ASSESSMENT — PAIN DESCRIPTION - LOCATION: LOCATION: BACK

## 2020-06-07 ASSESSMENT — PAIN DESCRIPTION - FREQUENCY: FREQUENCY: CONTINUOUS

## 2020-06-07 NOTE — PROGRESS NOTES
Hospitalist Progress Note      PCP: William Bhat MD    Date of Admission: 6/5/2020    Chief Complaint:  fatigue, cp     Hospital Course:   61 yo F with history of lung ca s/p resection presented with R sided chest pain, sob, fatigue, chills      Subjective: She is sitting up in bed, she is feeling better today, shortness of breath seems to be improving and she is eating and drinking well and feels a little stronger, informs me she has been up ambulating in her room without any issues. She denies any chest pain. Medications:  Reviewed    Infusion Medications    sodium chloride Stopped (06/07/20 0700)     Scheduled Medications    sodium chloride flush  10 mL Intravenous 2 times per day    sodium chloride flush  10 mL Intravenous 2 times per day    enoxaparin  40 mg Subcutaneous Daily    gabapentin  600 mg Oral TID    amitriptyline  25 mg Oral Nightly    traZODone  100 mg Oral Nightly    cetirizine  10 mg Oral Daily    therapeutic multivitamin-minerals  1 tablet Oral Daily    pantoprazole  40 mg Oral QAM AC    conjugated estrogens   Vaginal Daily    nicotine  1 patch Transdermal Daily    cefTRIAXone (ROCEPHIN) IV  1 g Intravenous Q12H     PRN Meds: sodium chloride flush, ipratropium-albuterol, sodium chloride flush, acetaminophen **OR** acetaminophen, polyethylene glycol, promethazine **OR** ondansetron, HYDROcodone-acetaminophen, sodium chloride, tiZANidine, melatonin ER      Intake/Output Summary (Last 24 hours) at 6/7/2020 1658  Last data filed at 6/7/2020 1230  Gross per 24 hour   Intake 680 ml   Output --   Net 680 ml       Physical Exam Performed:    BP (!) 126/114   Pulse 110   Temp 99.2 °F (37.3 °C) (Oral)   Resp 25   Ht 5' 2\" (1.575 m)   Wt 145 lb (65.8 kg)   LMP 09/22/2011   SpO2 96%   BMI 26.52 kg/m²     General appearance: She is sitting up in bed, in no apparent distress, alert and cooperative. HEENT: Pupils equal, round, and reactive to light.  Conjunctivae/corneas clear.  Neck: Supple, with full range of motion. No jugular venous distention. Trachea midline. Respiratory:  Normal respiratory effort. Clear to auscultation, bilaterally without Rales/Wheezes/Rhonchi. Cardiovascular: Regular rate and rhythm with normal S1/S2   Abdomen: Soft, non-tender, non-distended with normal bowel sounds. Musculoskeletal: No clubbing, cyanosis or edema bilaterally. Full range of motion without deformity. Neurologic:  Neurovascularly intact without any focal sensory/motor deficits. Cranial nerves: II-XII intact, grossly non-focal.  Psychiatric: Alert and oriented, thought content appropriate, normal insight  Capillary Refill: Brisk,< 3 seconds   Peripheral Pulses: +2 palpable, equal bilaterally       Labs:   Recent Labs     06/05/20 1841 06/06/20  0631 06/07/20  0637   WBC 16.5* 15.5* 12.7*   HGB 12.0 10.2* 10.0*   HCT 35.6* 30.0* 29.5*    296 310     Recent Labs     06/05/20 1841 06/06/20  0631    141   K 4.4 3.5    111*   CO2 25 23   BUN 15 10   CREATININE 0.8 0.6   CALCIUM 10.0 8.8   PHOS  --  1.7*     Recent Labs     06/05/20  1841   AST 31   ALT 29   BILITOT 0.5   ALKPHOS 56     No results for input(s): INR in the last 72 hours. Recent Labs     06/05/20 1841 06/06/20  0631   TROPONINI <0.01 <0.01       Urinalysis:      Lab Results   Component Value Date    NITRU Negative 06/05/2020    WBCUA 3-5 08/30/2018    BACTERIA 3+ 08/30/2018    RBCUA 0-2 08/30/2018    BLOODU Negative 06/05/2020    SPECGRAV 1.020 06/05/2020    GLUCOSEU Negative 06/05/2020       Radiology:  IR PICC WO SQ PORT/PUMP > 5 YEARS   Final Result   Successful placement of PICC line. CT CHEST WO CONTRAST   Final Result   1. Postsurgical changes in the right hemithorax   2. Ground-glass and patchy airspace opacities peripherally in the right lung   could represent atypical pneumonia         XR CHEST PORTABLE   Final Result   Status post partial right lung resection.   New haziness of the right

## 2020-06-07 NOTE — PROGRESS NOTES
Perfect served [employer]:  \"Patient has fluids ordered that are not currently infusing. Do you want me to hold off on fluids due to BP? \"  Orders received to not restart fluids.

## 2020-06-08 ENCOUNTER — APPOINTMENT (OUTPATIENT)
Dept: GENERAL RADIOLOGY | Age: 60
DRG: 871 | End: 2020-06-08
Payer: MEDICARE

## 2020-06-08 VITALS
HEART RATE: 84 BPM | RESPIRATION RATE: 15 BRPM | TEMPERATURE: 96.7 F | HEIGHT: 62 IN | WEIGHT: 145 LBS | OXYGEN SATURATION: 96 % | BODY MASS INDEX: 26.68 KG/M2 | SYSTOLIC BLOOD PRESSURE: 100 MMHG | DIASTOLIC BLOOD PRESSURE: 78 MMHG

## 2020-06-08 LAB
ANION GAP SERPL CALCULATED.3IONS-SCNC: 10 MMOL/L (ref 3–16)
BASOPHILS ABSOLUTE: 0.1 K/UL (ref 0–0.2)
BASOPHILS RELATIVE PERCENT: 1 %
BUN BLDV-MCNC: 8 MG/DL (ref 7–20)
CALCIUM SERPL-MCNC: 9.5 MG/DL (ref 8.3–10.6)
CHLORIDE BLD-SCNC: 104 MMOL/L (ref 99–110)
CO2: 23 MMOL/L (ref 21–32)
CREAT SERPL-MCNC: <0.5 MG/DL (ref 0.6–1.2)
EOSINOPHILS ABSOLUTE: 0.2 K/UL (ref 0–0.6)
EOSINOPHILS RELATIVE PERCENT: 1.8 %
GFR AFRICAN AMERICAN: >60
GFR NON-AFRICAN AMERICAN: >60
GLUCOSE BLD-MCNC: 107 MG/DL (ref 70–99)
HCT VFR BLD CALC: 32.8 % (ref 36–48)
HEMOGLOBIN: 11 G/DL (ref 12–16)
LACTIC ACID: 0.7 MMOL/L (ref 0.4–2)
LYMPHOCYTES ABSOLUTE: 1.9 K/UL (ref 1–5.1)
LYMPHOCYTES RELATIVE PERCENT: 15.4 %
MAGNESIUM: 2.1 MG/DL (ref 1.8–2.4)
MCH RBC QN AUTO: 29.6 PG (ref 26–34)
MCHC RBC AUTO-ENTMCNC: 33.6 G/DL (ref 31–36)
MCV RBC AUTO: 88 FL (ref 80–100)
MONOCYTES ABSOLUTE: 0.6 K/UL (ref 0–1.3)
MONOCYTES RELATIVE PERCENT: 4.6 %
NEUTROPHILS ABSOLUTE: 9.5 K/UL (ref 1.7–7.7)
NEUTROPHILS RELATIVE PERCENT: 77.2 %
PDW BLD-RTO: 13.4 % (ref 12.4–15.4)
PHOSPHORUS: 3.5 MG/DL (ref 2.5–4.9)
PLATELET # BLD: 322 K/UL (ref 135–450)
PMV BLD AUTO: 6.9 FL (ref 5–10.5)
POTASSIUM SERPL-SCNC: 3.9 MMOL/L (ref 3.5–5.1)
PROCALCITONIN: 1.22 NG/ML (ref 0–0.15)
RBC # BLD: 3.73 M/UL (ref 4–5.2)
SARS-COV-2: NOT DETECTED
SODIUM BLD-SCNC: 137 MMOL/L (ref 136–145)
SOURCE: NORMAL
WBC # BLD: 12.3 K/UL (ref 4–11)

## 2020-06-08 PROCEDURE — 84145 PROCALCITONIN (PCT): CPT

## 2020-06-08 PROCEDURE — 6370000000 HC RX 637 (ALT 250 FOR IP): Performed by: INTERNAL MEDICINE

## 2020-06-08 PROCEDURE — 83605 ASSAY OF LACTIC ACID: CPT

## 2020-06-08 PROCEDURE — 85025 COMPLETE CBC W/AUTO DIFF WBC: CPT

## 2020-06-08 PROCEDURE — 71045 X-RAY EXAM CHEST 1 VIEW: CPT

## 2020-06-08 PROCEDURE — 83735 ASSAY OF MAGNESIUM: CPT

## 2020-06-08 PROCEDURE — 6360000002 HC RX W HCPCS: Performed by: INTERNAL MEDICINE

## 2020-06-08 PROCEDURE — 80048 BASIC METABOLIC PNL TOTAL CA: CPT

## 2020-06-08 PROCEDURE — 36592 COLLECT BLOOD FROM PICC: CPT

## 2020-06-08 PROCEDURE — 84100 ASSAY OF PHOSPHORUS: CPT

## 2020-06-08 PROCEDURE — 2580000003 HC RX 258: Performed by: INTERNAL MEDICINE

## 2020-06-08 RX ORDER — CEFDINIR 300 MG/1
300 CAPSULE ORAL EVERY 12 HOURS SCHEDULED
Status: DISCONTINUED | OUTPATIENT
Start: 2020-06-09 | End: 2020-06-08 | Stop reason: HOSPADM

## 2020-06-08 RX ORDER — AMLODIPINE BESYLATE 5 MG/1
5 TABLET ORAL DAILY
Qty: 30 TABLET | Refills: 0 | Status: SHIPPED | OUTPATIENT
Start: 2020-06-09

## 2020-06-08 RX ORDER — CEFDINIR 300 MG/1
300 CAPSULE ORAL EVERY 12 HOURS SCHEDULED
Qty: 8 CAPSULE | Refills: 0 | Status: SHIPPED | OUTPATIENT
Start: 2020-06-09 | End: 2020-06-13

## 2020-06-08 RX ORDER — AZITHROMYCIN 250 MG/1
250 TABLET, FILM COATED ORAL DAILY
Status: DISCONTINUED | OUTPATIENT
Start: 2020-06-09 | End: 2020-06-08 | Stop reason: HOSPADM

## 2020-06-08 RX ORDER — AZITHROMYCIN 250 MG/1
250 TABLET, FILM COATED ORAL DAILY
Qty: 3 TABLET | Refills: 0 | Status: SHIPPED | OUTPATIENT
Start: 2020-06-09 | End: 2020-06-12

## 2020-06-08 RX ADMIN — CEFTRIAXONE SODIUM 1 G: 1 INJECTION, POWDER, FOR SOLUTION INTRAMUSCULAR; INTRAVENOUS at 08:54

## 2020-06-08 RX ADMIN — HYDROCODONE BITARTRATE AND ACETAMINOPHEN 1 TABLET: 10; 325 TABLET ORAL at 09:01

## 2020-06-08 RX ADMIN — GABAPENTIN 600 MG: 300 CAPSULE ORAL at 14:25

## 2020-06-08 RX ADMIN — ACETAMINOPHEN 650 MG: 325 TABLET ORAL at 17:10

## 2020-06-08 RX ADMIN — Medication 6 MG: at 00:28

## 2020-06-08 RX ADMIN — GABAPENTIN 600 MG: 300 CAPSULE ORAL at 08:54

## 2020-06-08 RX ADMIN — ENOXAPARIN SODIUM 40 MG: 40 INJECTION SUBCUTANEOUS at 08:54

## 2020-06-08 RX ADMIN — Medication 1 TABLET: at 08:54

## 2020-06-08 RX ADMIN — OXYCODONE AND ACETAMINOPHEN 1 TABLET: 5; 325 TABLET ORAL at 14:25

## 2020-06-08 RX ADMIN — OXYCODONE AND ACETAMINOPHEN 1 TABLET: 5; 325 TABLET ORAL at 00:29

## 2020-06-08 RX ADMIN — PANTOPRAZOLE SODIUM 40 MG: 40 TABLET, DELAYED RELEASE ORAL at 05:36

## 2020-06-08 RX ADMIN — AZITHROMYCIN MONOHYDRATE 500 MG: 500 INJECTION, POWDER, LYOPHILIZED, FOR SOLUTION INTRAVENOUS at 11:26

## 2020-06-08 RX ADMIN — OXYCODONE AND ACETAMINOPHEN 1 TABLET: 5; 325 TABLET ORAL at 06:45

## 2020-06-08 RX ADMIN — AMLODIPINE BESYLATE 10 MG: 5 TABLET ORAL at 08:54

## 2020-06-08 RX ADMIN — CETIRIZINE HYDROCHLORIDE 10 MG: 10 TABLET, FILM COATED ORAL at 08:54

## 2020-06-08 RX ADMIN — ACETAMINOPHEN 650 MG: 325 TABLET ORAL at 08:59

## 2020-06-08 ASSESSMENT — PAIN DESCRIPTION - LOCATION
LOCATION: HIP
LOCATION: HIP;LEG
LOCATION: HIP;LEG

## 2020-06-08 ASSESSMENT — PAIN DESCRIPTION - ORIENTATION
ORIENTATION: LEFT
ORIENTATION: LEFT

## 2020-06-08 ASSESSMENT — PAIN SCALES - GENERAL
PAINLEVEL_OUTOF10: 10
PAINLEVEL_OUTOF10: 5
PAINLEVEL_OUTOF10: 7
PAINLEVEL_OUTOF10: 4
PAINLEVEL_OUTOF10: 4
PAINLEVEL_OUTOF10: 5

## 2020-06-08 ASSESSMENT — PAIN DESCRIPTION - PAIN TYPE
TYPE: CHRONIC PAIN

## 2020-06-08 NOTE — CARE COORDINATION
CASE MANAGEMENT INITIAL ASSESSMENT      Reviewed chart and completed assessment via telephone with: the Pt  Explained Case Management role/services. Primary contact information: Pt Domestic Partner Tristin Wood Pt has reported that he is to be her primary medical decision maker     Admit date/status: inpatient 6/5/20  Diagnosis: Community acquired bacterial pneumonia  Is this a Readmission?:  none    Insurance: R [place] [address] required for SNF - Y        3 night stay required - N    Living arrangements, Adls, care needs, prior to admission: Pt  Reported that she lives with her domestic partner and was IPTA. She reports that her DP is very helpful if she needs help. Transportation: self. Domestic Partner    Durable Medical Equipment at home: none  Services in the home and/or outpatient, prior to admission:     Dialysis Facility (if applicable) none  · Name:  · Address:  · Dialysis Schedule:  · Phone:  · Fax:    PT/OT recs: none noted    Hospital Exemption Notification (HEN): if SNF is needed    Barriers to discharge: none noted    Plan/comments: Pt reported that she plans to DC home to her prior level of care Pt has insurance and a PCP per her report. Pt is denying DCP needs at this time. Please advise if needs arise.   FERNANDO Morris      ECOANTONIO on chart for MD signature

## 2020-06-08 NOTE — PROGRESS NOTES
Pt ok for discharge per MD. Discharge instructions & COVID isolation/self-quarantine information given. Prescriptions delivered via meds to beds. Pt stated understanding. No questions or concerns at this time. Transported by wheelchair to personal car.

## 2020-06-08 NOTE — DISCHARGE SUMMARY
3 days, Disp-3 tablet, R-0Normal      cefdinir (OMNICEF) 300 MG capsule Take 1 capsule by mouth every 12 hours for 4 days, Disp-8 capsule, R-0Normal              Details   montelukast (SINGULAIR) 10 MG tablet Take 10 mg by mouth nightlyHistorical Med      meloxicam (MOBIC) 7.5 MG tablet Take 7.5 mg by mouth dailyHistorical Med      HYDROcodone-acetaminophen (NORCO)  MG per tablet Take 1 tablet by mouth 5 times daily. PRNHistorical Med      tiZANidine (ZANAFLEX) 4 MG tablet Take 4 mg by mouth every 6 hours as neededHistorical Med      gabapentin (NEURONTIN) 400 MG capsule Take 600 mg by mouth 3 times daily. Historical Med      omeprazole (PRILOSEC) 20 MG delayed release capsule Take 20 mg by mouth DailyHistorical Med      traZODone (DESYREL) 100 MG tablet Take 100 mg by mouth nightlyHistorical Med      amitriptyline (ELAVIL) 25 MG tablet Take 25 mg by mouth nightlyHistorical Med      cetirizine (ZYRTEC) 10 MG tablet Take 10 mg by mouth dailyHistorical Med      diclofenac sodium 1 % GEL Apply 2 g topically 2 times daily, Topical, 2 TIMES DAILY, Historical Med      tamsulosin (FLOMAX) 0.4 MG capsule Take 0.4 mg by mouth dailyHistorical Med      fluticasone (FLONASE) 50 MCG/ACT nasal spray 1 spray by Each Nare route dailyHistorical Med      alendronate (FOSAMAX) 70 MG tablet Take 70 mg by mouth every 7 daysHistorical Med      albuterol sulfate  (90 Base) MCG/ACT inhaler Inhale 2 puffs into the lungs every 6 hours as needed for WheezingHistorical Med      ondansetron (ZOFRAN ODT) 4 MG disintegrating tablet Take 1 tablet by mouth every 8 hours as needed for Nausea, Disp-20 tablet, R-0Print      !! naproxen (NAPROSYN) 500 MG tablet Historical Med      Multiple Vitamins-Minerals (THERAPEUTIC MULTIVITAMIN-MINERALS) tablet Take 1 tablet by mouth daily. calcium carbonate (OSCAL) 500 MG TABS tablet Take 500 mg by mouth daily. !! NAPROXEN PO Take  by mouth.         amphetamine-dextroamphetamine (ADDERALL,

## 2020-06-10 LAB
BLOOD CULTURE, ROUTINE: NORMAL
CULTURE, BLOOD 2: NORMAL

## 2021-03-08 LAB
A/G RATIO: 1.7 (ref 1.1–2.2)
ALBUMIN SERPL-MCNC: 5 G/DL (ref 3.4–5)
ALP BLD-CCNC: 76 U/L (ref 40–129)
ALT SERPL-CCNC: 14 U/L (ref 10–40)
ANION GAP SERPL CALCULATED.3IONS-SCNC: 12 MMOL/L (ref 3–16)
AST SERPL-CCNC: 14 U/L (ref 15–37)
BILIRUB SERPL-MCNC: <0.2 MG/DL (ref 0–1)
BUN BLDV-MCNC: 20 MG/DL (ref 7–20)
CALCIUM SERPL-MCNC: 10.5 MG/DL (ref 8.3–10.6)
CHLORIDE BLD-SCNC: 99 MMOL/L (ref 99–110)
CO2: 26 MMOL/L (ref 21–32)
CREAT SERPL-MCNC: 0.6 MG/DL (ref 0.6–1.2)
GFR AFRICAN AMERICAN: >60
GFR NON-AFRICAN AMERICAN: >60
GLOBULIN: 3 G/DL
GLUCOSE BLD-MCNC: 92 MG/DL (ref 70–99)
POTASSIUM SERPL-SCNC: 4.5 MMOL/L (ref 3.5–5.1)
SODIUM BLD-SCNC: 137 MMOL/L (ref 136–145)
TOTAL PROTEIN: 8 G/DL (ref 6.4–8.2)

## 2021-10-18 ENCOUNTER — APPOINTMENT (OUTPATIENT)
Dept: GENERAL RADIOLOGY | Age: 61
End: 2021-10-18
Payer: MEDICARE

## 2021-10-18 ENCOUNTER — HOSPITAL ENCOUNTER (EMERGENCY)
Age: 61
Discharge: HOME | End: 2021-10-18
Payer: MEDICARE

## 2021-10-18 VITALS
RESPIRATION RATE: 15 BRPM | BODY MASS INDEX: 2.58 KG/M2 | SYSTOLIC BLOOD PRESSURE: 96 MMHG | HEIGHT: 62 IN | OXYGEN SATURATION: 97 % | TEMPERATURE: 99.5 F | WEIGHT: 14 LBS | DIASTOLIC BLOOD PRESSURE: 50 MMHG | HEART RATE: 89 BPM
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
## 2021-10-18 DIAGNOSIS — M25.511 ACUTE PAIN OF RIGHT SHOULDER: ICD-10-CM

## 2021-10-18 DIAGNOSIS — S20.211A CONTUSION OF RIB ON RIGHT SIDE, INITIAL ENCOUNTER: Primary | ICD-10-CM

## 2021-10-18 PROCEDURE — 6370000000 HC RX 637 (ALT 250 FOR IP): Performed by: NURSE PRACTITIONER

## 2021-10-18 PROCEDURE — 71101 X-RAY EXAM UNILAT RIBS/CHEST: CPT

## 2021-10-18 PROCEDURE — 99284 EMERGENCY DEPT VISIT MOD MDM: CPT

## 2021-10-18 PROCEDURE — 73030 X-RAY EXAM OF SHOULDER: CPT

## 2021-10-18 RX ORDER — OXYCODONE HYDROCHLORIDE AND ACETAMINOPHEN 5; 325 MG/1; MG/1
1-2 TABLET ORAL EVERY 6 HOURS PRN
Qty: 10 TABLET | Refills: 0 | Status: SHIPPED | OUTPATIENT
Start: 2021-10-18 | End: 2021-10-21

## 2021-10-18 RX ORDER — OXYCODONE HYDROCHLORIDE AND ACETAMINOPHEN 5; 325 MG/1; MG/1
1 TABLET ORAL ONCE
Status: COMPLETED | OUTPATIENT
Start: 2021-10-18 | End: 2021-10-18

## 2021-10-18 RX ADMIN — OXYCODONE AND ACETAMINOPHEN 1 TABLET: 5; 325 TABLET ORAL at 13:27

## 2021-10-18 ASSESSMENT — PAIN SCALES - GENERAL
PAINLEVEL_OUTOF10: 5
PAINLEVEL_OUTOF10: 8
PAINLEVEL_OUTOF10: 8

## 2021-10-18 ASSESSMENT — PAIN DESCRIPTION - ORIENTATION: ORIENTATION: RIGHT

## 2021-10-18 ASSESSMENT — PAIN DESCRIPTION - PAIN TYPE: TYPE: ACUTE PAIN

## 2021-10-18 ASSESSMENT — PAIN DESCRIPTION - LOCATION: LOCATION: RIB CAGE;SHOULDER

## 2021-10-18 NOTE — ED PROVIDER NOTES
Evaluated by Advanced Practice Provider    [facility]  ED    CHIEFCOMPLAINT  Fall (lost balance and fell into wooden part of couch last night), Rib Injury, and Shoulder Pain    HISTORY OF PRESENT ILLNESS  Chaka Jones is a 63 y.o. female who presents to the ED complaining of fall and rib pain. She has a bad foot and this caused her to fall. She hit her right posterior chest/back and shoulder on the hard part of the couch and is having pain. Patient states it is painful to move and change positions, take a deep breath, and cough. Patient denies shortness of breath, denies abdominal pain, nausea,vomiting, diarrhea. Patient denies any fever or chills. The patient is currently rating their pain as 8/10 and describes it as an aching type of pain. Treatments tried prior to arrival in the ED: none. The patient denies other complaints, modifying factors or associatedsymptoms. The patient arrived to the ED via private car. PAST MEDICAL HISTORY    Past Medical History:   Diagnosis Date    ADHD (attention deficit hyperactivity disorder)     Arthritis     Asthma     Cancer ([facility] [address]     History of blood transfusion     Recurrent sinus infections     Wears glasses     Wears partial dentures     implants on bottom to hold partial / partial on top also       HISTORY    Past Surgical History:   Procedure Laterality Date    ANKLE FRACTURE SURGERY Right 8/4/14    CARPAL TUNNEL RELEASE      right    COSMETIC SURGERY      DENTAL SURGERY      implants    FOOT SURGERY Left     x2    FRACTURE SURGERY      KNEE ARTHROSCOPY      left    NOSE SURGERY      implant placed    NOSE SURGERY      1 nasal implant removed    TUBAL LIGATION         CURRENT MEDICATIONS    Current Outpatient Rx   Medication Sig Dispense Refill    oxyCODONE-acetaminophen (PERCOCET) 5-325 MG per tablet Take 1-2 tablets by mouth every 6 hours as needed for Pain for up to 3 days. WARNING:  May cause drowsiness.   May impair ability to operate vehicles or machinery. Do not use in combination with alcohol. 10 tablet 0    amLODIPine (NORVASC) 5 MG tablet Take 1 tablet by mouth daily 30 tablet 0    montelukast (SINGULAIR) 10 MG tablet Take 10 mg by mouth nightly      meloxicam (MOBIC) 7.5 MG tablet Take 7.5 mg by mouth daily      HYDROcodone-acetaminophen (NORCO)  MG per tablet Take 1 tablet by mouth 5 times daily. PRN      tiZANidine (ZANAFLEX) 4 MG tablet Take 4 mg by mouth every 6 hours as needed      gabapentin (NEURONTIN) 400 MG capsule Take 600 mg by mouth 3 times daily.  omeprazole (PRILOSEC) 20 MG delayed release capsule Take 20 mg by mouth Daily      traZODone (DESYREL) 100 MG tablet Take 100 mg by mouth nightly      amitriptyline (ELAVIL) 25 MG tablet Take 25 mg by mouth nightly      cetirizine (ZYRTEC) 10 MG tablet Take 10 mg by mouth daily      diclofenac sodium 1 % GEL Apply 2 g topically 2 times daily      tamsulosin (FLOMAX) 0.4 MG capsule Take 0.4 mg by mouth daily      fluticasone (FLONASE) 50 MCG/ACT nasal spray 1 spray by Each Nare route daily      alendronate (FOSAMAX) 70 MG tablet Take 70 mg by mouth every 7 days      albuterol sulfate  (90 Base) MCG/ACT inhaler Inhale 2 puffs into the lungs every 6 hours as needed for Wheezing      ondansetron (ZOFRAN ODT) 4 MG disintegrating tablet Take 1 tablet by mouth every 8 hours as needed for Nausea 20 tablet 0    naproxen (NAPROSYN) 500 MG tablet       Multiple Vitamins-Minerals (THERAPEUTIC MULTIVITAMIN-MINERALS) tablet Take 1 tablet by mouth daily.  calcium carbonate (OSCAL) 500 MG TABS tablet Take 500 mg by mouth daily.  NAPROXEN PO Take  by mouth.  amphetamine-dextroamphetamine (ADDERALL, 30MG,) 30 MG tablet Take 60 mg by mouth 2 times daily.          ALLERGIES    No Known Allergies    FAMILY HISTORY    Family History   Adopted: Yes   Problem Relation Age of Onset    Stroke Father        SOCIAL HISTORY    Social History     Socioeconomic History    Marital status: Life Partner     Spouse name: None    Number of children: None    Years of education: None    Highest education level: None   Occupational History    None   Tobacco Use    Smoking status: Former Smoker     Packs/day: 1.00     Years: 35.00     Pack years: 35.00     Types: Cigarettes    Smokeless tobacco: Never Used   Vaping Use    Vaping Use: Former    Substances: Always   Substance and Sexual Activity    Alcohol use: Not Currently     Comment: Said she gave it up - [address]    Drug use: Not Currently     Comment: tried it in her past    Sexual activity: Yes     Partners: Male   Other Topics Concern    None   Social History Narrative    None     Social Determinants of Health     Financial Resource Strain:     Difficulty of Paying Living Expenses:    Food Insecurity:     Worried About Running Out of Food in the Last Year:     Ran Out of Food in the Last Year:    Transportation Needs:     Lack of Transportation (Medical):  Lack of Transportation (Non-Medical):    Physical Activity:     Days of Exercise per Week:     Minutes of Exercise per Session:    Stress:     Feeling of Stress :    Social Connections:     Frequency of Communication with Friends and Family:     Frequency of Social Gatherings with Friends and Family:     Attends [employer] Services:     Active Member of Clubs or Organizations:     Attends Club or Organization Meetings:     Marital Status:    Intimate Partner Violence:     Fear of Current or Ex-Partner:     Emotionally Abused:     Physically Abused:     Sexually Abused:        REVIEW OF SYSTEMS    10 systems reviewed, pertinent positives per HPI otherwise noted to be negative    PHYSICAL EXAM  Physical Exam  Vitals:    10/18/21 1501   BP: (!) 96/50   Pulse: 89   Resp: 15   Temp:    SpO2: 97%     GENERAL: Patient is well-developed, well-nourished. Awake and alert. Cooperative.  Resting in bed. No apparent distress. HEENT:  Normocephalic, atraumatic. Conjunctiva appear normal. Sclera is non-icteric. External ears are normal. Supple with normal ROM. Trachea midline  LUNGS:Equal and symmetric chest rise. Breathing is unlabored. Speaking comfortably in full sentences. Lungs are clear bilaterally to auscultation. Without wheezing, rales, or rhonchi. Tenderness to palpation of the right lateral ribcage. There is no obvious bruising, deformity, or trauma. There is no bony depressions, instability, step -offs or crepitus to palpation. CADIOVASCULAR: Tachycardic rate and rhythm. Normal S1-S2 sounds. No murmurs, rubs, or gallops. Capillary refill is brisk in all 4 extremities. Bilateral lower extremities are equal in size,there is no swelling observed. There is no tenderness to palpation. There is no erythema observed or warmth palpated. GI: Soft, nontender, nondistended with positive bowel sounds. No rebound tenderness, guarding or any peritoneal signs. No masses or hepatosplenomegaly   MUSCULOSKELETAL: No tenderness to palpation of the bony aspect of the left shoulder but there is tenderness to palpation over the acromial clavicular joint. Range of motion is intact to the right shoulder but does worsen pain. Right lower extremity is distally neurovascularly intact. To all other extremities: No gross deformities or trauma noted. Moving all extremities equally and appropriately. Normal ROM. SKIN: Warm/dry. Skin is intact. No rashes/lesions noted. PSYCHIATRIC: Mood and affect appropriate. Speech is clear and articulate. NEUROLOGIC: Alert andoriented. No focal motor or sensory deficits. LABS  I have reviewed all labs for this visit. No results found for this visit on 10/18/21.     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 There is chronic volume loss in the right lung. There is no acute fracture or dislocation. No acute injury of the right ribs is identified. Right thoracotomy changes, stable in the interval.     XR SHOULDER RIGHT (MIN 2 VIEWS)    Result Date: 10/18/2021  EXAMINATION: THREE XRAY VIEWS OF THE RIGHT SHOULDER 10/18/2021 1:39 pm COMPARISON: Chest of 8 June 2020 HISTORY: ORDERING SYSTEM PROVIDED HISTORY: pain after fall TECHNOLOGIST PROVIDED HISTORY: Reason for exam:->pain after fall Reason for Exam: pain after fall in right shoulder Acuity: Acute Type of Exam: Initial FINDINGS: Osteopenia limits assessment. The humeral head is well circumscribed and situated within the glenoid fossa. Mild acromioclavicular and glenohumeral degenerative changes are present. No acute fracture, dislocation or effusion is noted. Surgical clips are present about the right hilum. Cortical oriented sclerotic lesion is present in the proximal humerus with smooth periosteal reaction partially included on the image in the humeral shaft. Mild degenerative changes. No acute osseous abnormality. However, the patient has a sclerotic lesion in the right humerus which was likely present on prior study of 8 June 2020     ED COURSE/MDM  Patient seen and evaluated. Old records reviewed. Diagnostic testing reviewed and results discussed. I have evaluated this patient. My supervising physician was available for consultation. Kyra Carr presented to the ED today with above noted complaints. Physical exam revealed right lateral rib cage tenderness to palpation.  Patient states it is painful to move and change positions, take a deep breath, and cough. Denies shortness of breath and difficulty breathing. Lung sounds bilaterally are normal, withgood air movement in all lung fields. Trachea is midline, chest rise is equal and symmetrical. Patient's description is consistent with rib contusion vs. fracture. Right side rib Xray series was ordered and shows no acute findings. Patient also reporting pain into the right shoulder, there is no bony tenderness to the right shoulder but there was pain over the right acromioclavicular joint. Right upper extremity is distally neurovascularly intact. X-ray of the right shoulder was obtained and without acute findings. I feel patients symptoms are consistent with rib contusion and shoulder strain. At this point I do not feel the patient requires further work up and it is reasonable to discharge the patient. The patient will be sent home with prescriptions for medication for pain control, patient is on chronic pain medication, I did give her a very short supply of Percocet since this is a new injury. Patient was instructed to follow up with PCP if symptoms do not improve over the next 10-14 days. Please refer to AVS for further details regarding discharge instructions. On the right shoulder x-ray there is a sclerotic lesion in the right humerus that was likely present on prior study of June 8, 2020. I did advise the patient of this finding and that she should follow-up with her primary care provider or an orthopedist regarding this. Patient states that she will follow up with PCP and does not need an orthopedist referral at this time. While in ED patient received:  Medications   oxyCODONE-acetaminophen (PERCOCET) 5-325 MG per tablet 1 tablet (1 tablet Oral Given 10/18/21 1327)     A discussion was had with the patient regarding diagnosis, diagnostic testing results, treatment/ plan of care, and follow up. All questions were answered.  Patient will followup as directed above for further evaluation/treatment. The patient was given strict return precautions as we discussed symptoms that would necessitate return to the ED. Patient will return to ED for new/worsening symptoms. The patient verbalized their understanding and agreement with the above plan. I estimate there is LOW risk for PULMONARY EMBOLISM, ACUTE CORONARYSYNDROME, OR THORACIC AORTIC DISSECTION, thus I consider the discharge disposition reasonable. Randolph Mulligan and I have discussed the diagnosis and risks, and we agree with discharging home to follow-up with their primary doctor. We also discussed returning to the Emergency Department immediately if new or worsening symptoms occur. We have discussed the symptoms which are most concerning (e.g., bloody sputum, fever, worsening pain or shortness ofbreath, vomiting) that necessitate immediate return. Clincal Impression    1. Contusion of rib on right side, initial encounter    2. Acute pain of right shoulder    3. Fall, initial encounter        Blood pressure (!) 96/50, pulse 89, temperature 99.5 °F (37.5 °C), temperature source Oral, resp. rate 15, height 5' 2\" (1.575 m), weight 14 lb (6.35 kg), last menstrual period 09/22/2011, SpO2 97 %. Patient was sent home with a prescription for below medication/s. I did [place] patient on appropriate use of these medication. Discharge Medication List as of 10/18/2021  3:04 PM      START taking these medications    Details   oxyCODONE-acetaminophen (PERCOCET) 5-325 MG per tablet Take 1-2 tablets by mouth every 6 hours as needed for Pain for up to 3 days. WARNING:  May cause drowsiness. May impair ability to operate vehicles or machinery.   Do not use in combination with alcohol., Disp-10 tablet, R-0Print             FOLLOW UP  Marainn Emery DO  [address] [facility] [address] [phone]    Call in 1 day  For further evaluation    [facility]  ED  [address] [facility] [address]  [phone]  Go to   If symptoms worsen      DISPOSITION  Patient was discharged to home in good condition. Comment: [employer] this report has been produced using speech recognition software and may contain errors related to that system including errors in grammar, punctuation, and spelling, as well as words and phrases that may beinappropriate. If there are any questions or concerns please feel free to contact the dictating provider for clarification.         INNA Awad - CNP  10/18/21 2050

## 2021-10-18 NOTE — ED NOTES
Marvel Hope NP speaking to patient regarding results and plan of care.       Khris Frey RN  10/18/21 0007

## 2021-10-18 NOTE — ED NOTES
Patient discharged with all belongings, discharge paperwork and prescriptions x 1. Patient verbalized understanding of discharge instructions and follow up with PCP. Patient taken by wheelchair out of ED.      Deanna Dunn RN  10/18/21 1257

## 2023-06-12 PROBLEM — T84.84XA PAINFUL ORTHOPAEDIC HARDWARE (HCC): Status: ACTIVE | Noted: 2023-06-12

## 2024-05-07 ENCOUNTER — APPOINTMENT (OUTPATIENT)
Dept: GENERAL RADIOLOGY | Age: 64
DRG: 493 | End: 2024-05-07
Payer: MEDICARE

## 2024-05-07 ENCOUNTER — ANESTHESIA EVENT (OUTPATIENT)
Dept: OPERATING ROOM | Age: 64
End: 2024-05-07
Payer: MEDICARE

## 2024-05-07 ENCOUNTER — HOSPITAL ENCOUNTER (INPATIENT)
Age: 64
LOS: 7 days | Discharge: NURSING, REHAB OR LONG-TERM CARE | DRG: 493 | End: 2024-05-14
Attending: EMERGENCY MEDICINE | Admitting: INTERNAL MEDICINE
Payer: MEDICARE
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
## 2024-05-07 DIAGNOSIS — S82.242A CLOSED DISPLACED SPIRAL FRACTURE OF SHAFT OF LEFT TIBIA, INITIAL ENCOUNTER: Primary | ICD-10-CM
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
## 2024-05-07 DIAGNOSIS — G89.29 OTHER CHRONIC PAIN: ICD-10-CM

## 2024-05-07 LAB
ALBUMIN SERPL-MCNC: 4 G/DL (ref 3.4–5)
ALBUMIN/GLOB SERPL: 1.5 {RATIO} (ref 1.1–2.2)
ALP SERPL-CCNC: 65 U/L (ref 40–129)
ALT SERPL-CCNC: 9 U/L (ref 10–40)
ANION GAP SERPL CALCULATED.3IONS-SCNC: 13 MMOL/L (ref 3–16)
AST SERPL-CCNC: 10 U/L (ref 15–37)
BASOPHILS # BLD: 0.1 K/UL (ref 0–0.2)
BASOPHILS NFR BLD: 0.3 %
BILIRUB SERPL-MCNC: <0.2 MG/DL (ref 0–1)
BUN SERPL-MCNC: 9 MG/DL (ref 7–20)
CALCIUM SERPL-MCNC: 10 MG/DL (ref 8.3–10.6)
CHLORIDE SERPL-SCNC: 103 MMOL/L (ref 99–110)
CO2 SERPL-SCNC: 23 MMOL/L (ref 21–32)
CREAT SERPL-MCNC: 0.6 MG/DL (ref 0.6–1.2)
DEPRECATED RDW RBC AUTO: 13.2 % (ref 12.4–15.4)
EKG ATRIAL RATE: 87 BPM
EKG DIAGNOSIS: NORMAL
EKG P AXIS: 63 DEGREES
EKG P-R INTERVAL: 170 MS
EKG Q-T INTERVAL: 364 MS
EKG QRS DURATION: 86 MS
EKG QTC CALCULATION (BAZETT): 438 MS
EKG R AXIS: 57 DEGREES
EKG T AXIS: 38 DEGREES
EKG VENTRICULAR RATE: 87 BPM
EOSINOPHIL # BLD: 0 K/UL (ref 0–0.6)
EOSINOPHIL NFR BLD: 0 %
GFR SERPLBLD CREATININE-BSD FMLA CKD-EPI: >90 ML/MIN/{1.73_M2}
GLUCOSE SERPL-MCNC: 116 MG/DL (ref 70–99)
HCT VFR BLD AUTO: 29.4 % (ref 36–48)
HGB BLD-MCNC: 9.5 G/DL (ref 12–16)
LYMPHOCYTES # BLD: 1 K/UL (ref 1–5.1)
LYMPHOCYTES NFR BLD: 5.4 %
MCH RBC QN AUTO: 28.2 PG (ref 26–34)
MCHC RBC AUTO-ENTMCNC: 32.3 G/DL (ref 31–36)
MCV RBC AUTO: 87.4 FL (ref 80–100)
MONOCYTES # BLD: 1.5 K/UL (ref 0–1.3)
MONOCYTES NFR BLD: 8.4 %
NEUTROPHILS # BLD: 15.7 K/UL (ref 1.7–7.7)
NEUTROPHILS NFR BLD: 85.9 %
PLATELET # BLD AUTO: 516 K/UL (ref 135–450)
PMV BLD AUTO: 6.6 FL (ref 5–10.5)
POTASSIUM SERPL-SCNC: 4.2 MMOL/L (ref 3.5–5.1)
PROT SERPL-MCNC: 6.7 G/DL (ref 6.4–8.2)
RBC # BLD AUTO: 3.36 M/UL (ref 4–5.2)
SODIUM SERPL-SCNC: 139 MMOL/L (ref 136–145)
WBC # BLD AUTO: 18.3 K/UL (ref 4–11)

## 2024-05-07 PROCEDURE — 96374 THER/PROPH/DIAG INJ IV PUSH: CPT

## 2024-05-07 PROCEDURE — 93005 ELECTROCARDIOGRAM TRACING: CPT | Performed by: EMERGENCY MEDICINE

## 2024-05-07 PROCEDURE — 6370000000 HC RX 637 (ALT 250 FOR IP): Performed by: SPECIALIST/TECHNOLOGIST

## 2024-05-07 PROCEDURE — 6360000002 HC RX W HCPCS: Performed by: INTERNAL MEDICINE

## 2024-05-07 PROCEDURE — 73590 X-RAY EXAM OF LOWER LEG: CPT

## 2024-05-07 PROCEDURE — 80053 COMPREHEN METABOLIC PANEL: CPT

## 2024-05-07 PROCEDURE — 6370000000 HC RX 637 (ALT 250 FOR IP): Performed by: INTERNAL MEDICINE

## 2024-05-07 PROCEDURE — 6360000002 HC RX W HCPCS: Performed by: SPECIALIST/TECHNOLOGIST

## 2024-05-07 PROCEDURE — 93010 ELECTROCARDIOGRAM REPORT: CPT | Performed by: INTERNAL MEDICINE

## 2024-05-07 PROCEDURE — 2580000003 HC RX 258: Performed by: INTERNAL MEDICINE

## 2024-05-07 PROCEDURE — 85025 COMPLETE CBC W/AUTO DIFF WBC: CPT

## 2024-05-07 PROCEDURE — 1200000000 HC SEMI PRIVATE

## 2024-05-07 PROCEDURE — 99285 EMERGENCY DEPT VISIT HI MDM: CPT

## 2024-05-07 PROCEDURE — 6360000002 HC RX W HCPCS: Performed by: EMERGENCY MEDICINE

## 2024-05-07 PROCEDURE — 99222 1ST HOSP IP/OBS MODERATE 55: CPT | Performed by: ORTHOPAEDIC SURGERY

## 2024-05-07 PROCEDURE — 6370000000 HC RX 637 (ALT 250 FOR IP): Performed by: NURSE PRACTITIONER

## 2024-05-07 PROCEDURE — 96375 TX/PRO/DX INJ NEW DRUG ADDON: CPT

## 2024-05-07 RX ORDER — SODIUM CHLORIDE 9 MG/ML
INJECTION, SOLUTION INTRAVENOUS PRN
Status: DISCONTINUED | OUTPATIENT
Start: 2024-05-07 | End: 2024-05-14 | Stop reason: HOSPADM

## 2024-05-07 RX ORDER — PANTOPRAZOLE SODIUM 40 MG/1
40 TABLET, DELAYED RELEASE ORAL
Status: DISCONTINUED | OUTPATIENT
Start: 2024-05-08 | End: 2024-05-14 | Stop reason: HOSPADM

## 2024-05-07 RX ORDER — TIZANIDINE 4 MG/1
4 TABLET ORAL EVERY 6 HOURS PRN
Status: DISCONTINUED | OUTPATIENT
Start: 2024-05-07 | End: 2024-05-14 | Stop reason: HOSPADM

## 2024-05-07 RX ORDER — ONDANSETRON 4 MG/1
4 TABLET, ORALLY DISINTEGRATING ORAL EVERY 8 HOURS PRN
Status: DISCONTINUED | OUTPATIENT
Start: 2024-05-07 | End: 2024-05-14 | Stop reason: HOSPADM

## 2024-05-07 RX ORDER — TRAZODONE HYDROCHLORIDE 50 MG/1
500 TABLET ORAL NIGHTLY
Status: DISCONTINUED | OUTPATIENT
Start: 2024-05-07 | End: 2024-05-14 | Stop reason: HOSPADM

## 2024-05-07 RX ORDER — POLYETHYLENE GLYCOL 3350 17 G/17G
17 POWDER, FOR SOLUTION ORAL DAILY PRN
Status: DISCONTINUED | OUTPATIENT
Start: 2024-05-07 | End: 2024-05-13

## 2024-05-07 RX ORDER — ONDANSETRON 2 MG/ML
4 INJECTION INTRAMUSCULAR; INTRAVENOUS EVERY 6 HOURS PRN
Status: DISCONTINUED | OUTPATIENT
Start: 2024-05-07 | End: 2024-05-14 | Stop reason: HOSPADM

## 2024-05-07 RX ORDER — MORPHINE SULFATE 2 MG/ML
2 INJECTION, SOLUTION INTRAMUSCULAR; INTRAVENOUS
Status: DISCONTINUED | OUTPATIENT
Start: 2024-05-07 | End: 2024-05-12

## 2024-05-07 RX ORDER — ACETAMINOPHEN 325 MG/1
650 TABLET ORAL EVERY 6 HOURS PRN
Status: DISCONTINUED | OUTPATIENT
Start: 2024-05-07 | End: 2024-05-14 | Stop reason: HOSPADM

## 2024-05-07 RX ORDER — ALBUTEROL SULFATE 90 UG/1
2 AEROSOL, METERED RESPIRATORY (INHALATION) EVERY 6 HOURS PRN
Status: DISCONTINUED | OUTPATIENT
Start: 2024-05-07 | End: 2024-05-14 | Stop reason: HOSPADM

## 2024-05-07 RX ORDER — ONDANSETRON 2 MG/ML
4 INJECTION INTRAMUSCULAR; INTRAVENOUS ONCE
Status: COMPLETED | OUTPATIENT
Start: 2024-05-07 | End: 2024-05-07

## 2024-05-07 RX ORDER — SODIUM CHLORIDE 0.9 % (FLUSH) 0.9 %
5-40 SYRINGE (ML) INJECTION PRN
Status: DISCONTINUED | OUTPATIENT
Start: 2024-05-07 | End: 2024-05-14 | Stop reason: HOSPADM

## 2024-05-07 RX ORDER — ACETAMINOPHEN 650 MG/1
650 SUPPOSITORY RECTAL EVERY 6 HOURS PRN
Status: DISCONTINUED | OUTPATIENT
Start: 2024-05-07 | End: 2024-05-14 | Stop reason: HOSPADM

## 2024-05-07 RX ORDER — PANTOPRAZOLE SODIUM 40 MG/1
40 TABLET, DELAYED RELEASE ORAL
Status: DISCONTINUED | OUTPATIENT
Start: 2024-05-08 | End: 2024-05-07

## 2024-05-07 RX ORDER — AMITRIPTYLINE HYDROCHLORIDE 25 MG/1
25 TABLET, FILM COATED ORAL NIGHTLY
Status: DISCONTINUED | OUTPATIENT
Start: 2024-05-07 | End: 2024-05-14 | Stop reason: HOSPADM

## 2024-05-07 RX ORDER — GABAPENTIN 300 MG/1
600 CAPSULE ORAL 2 TIMES DAILY
Status: DISCONTINUED | OUTPATIENT
Start: 2024-05-07 | End: 2024-05-07

## 2024-05-07 RX ORDER — SODIUM CHLORIDE 9 MG/ML
INJECTION, SOLUTION INTRAVENOUS CONTINUOUS
Status: ACTIVE | OUTPATIENT
Start: 2024-05-07 | End: 2024-05-09

## 2024-05-07 RX ORDER — ENOXAPARIN SODIUM 100 MG/ML
40 INJECTION SUBCUTANEOUS DAILY
Status: DISCONTINUED | OUTPATIENT
Start: 2024-05-07 | End: 2024-05-08

## 2024-05-07 RX ORDER — MORPHINE SULFATE 4 MG/ML
4 INJECTION, SOLUTION INTRAMUSCULAR; INTRAVENOUS ONCE
Status: COMPLETED | OUTPATIENT
Start: 2024-05-07 | End: 2024-05-07

## 2024-05-07 RX ORDER — ASPIRIN 81 MG/1
81 TABLET ORAL DAILY
Status: DISCONTINUED | OUTPATIENT
Start: 2024-05-07 | End: 2024-05-14 | Stop reason: HOSPADM

## 2024-05-07 RX ORDER — CEFAZOLIN SODIUM IN 0.9 % NACL 2 G/100 ML
2000 PLASTIC BAG, INJECTION (ML) INTRAVENOUS
Status: COMPLETED | OUTPATIENT
Start: 2024-05-08 | End: 2024-05-08

## 2024-05-07 RX ORDER — SODIUM CHLORIDE 0.9 % (FLUSH) 0.9 %
5-40 SYRINGE (ML) INJECTION EVERY 12 HOURS SCHEDULED
Status: DISCONTINUED | OUTPATIENT
Start: 2024-05-07 | End: 2024-05-14 | Stop reason: HOSPADM

## 2024-05-07 RX ORDER — HYDROCODONE BITARTRATE AND ACETAMINOPHEN 10; 325 MG/1; MG/1
1 TABLET ORAL EVERY 8 HOURS
Status: DISCONTINUED | OUTPATIENT
Start: 2024-05-07 | End: 2024-05-07

## 2024-05-07 RX ORDER — HYDROCODONE BITARTRATE AND ACETAMINOPHEN 10; 325 MG/1; MG/1
1 TABLET ORAL 3 TIMES DAILY PRN
Status: DISCONTINUED | OUTPATIENT
Start: 2024-05-07 | End: 2024-05-09

## 2024-05-07 RX ORDER — MORPHINE SULFATE 4 MG/ML
4 INJECTION, SOLUTION INTRAMUSCULAR; INTRAVENOUS
Status: DISCONTINUED | OUTPATIENT
Start: 2024-05-07 | End: 2024-05-12

## 2024-05-07 RX ADMIN — ASPIRIN 81 MG: 81 TABLET, COATED ORAL at 08:52

## 2024-05-07 RX ADMIN — MORPHINE SULFATE 4 MG: 4 INJECTION, SOLUTION INTRAMUSCULAR; INTRAVENOUS at 23:32

## 2024-05-07 RX ADMIN — SODIUM CHLORIDE: 9 INJECTION, SOLUTION INTRAVENOUS at 09:44

## 2024-05-07 RX ADMIN — TRAZODONE HYDROCHLORIDE 500 MG: 50 TABLET ORAL at 21:07

## 2024-05-07 RX ADMIN — ENOXAPARIN SODIUM 40 MG: 100 INJECTION SUBCUTANEOUS at 10:44

## 2024-05-07 RX ADMIN — MORPHINE SULFATE 4 MG: 4 INJECTION, SOLUTION INTRAMUSCULAR; INTRAVENOUS at 15:07

## 2024-05-07 RX ADMIN — ONDANSETRON 4 MG: 2 INJECTION INTRAMUSCULAR; INTRAVENOUS at 06:56

## 2024-05-07 RX ADMIN — MORPHINE SULFATE 4 MG: 4 INJECTION, SOLUTION INTRAMUSCULAR; INTRAVENOUS at 09:40

## 2024-05-07 RX ADMIN — HYDROCODONE BITARTRATE AND ACETAMINOPHEN 1 TABLET: 10; 325 TABLET ORAL at 18:52

## 2024-05-07 RX ADMIN — AMITRIPTYLINE HYDROCHLORIDE 25 MG: 25 TABLET, FILM COATED ORAL at 21:02

## 2024-05-07 RX ADMIN — TIZANIDINE 4 MG: 4 TABLET ORAL at 23:37

## 2024-05-07 RX ADMIN — SODIUM CHLORIDE, PRESERVATIVE FREE 10 ML: 5 INJECTION INTRAVENOUS at 09:44

## 2024-05-07 RX ADMIN — MORPHINE SULFATE 4 MG: 4 INJECTION, SOLUTION INTRAMUSCULAR; INTRAVENOUS at 21:31

## 2024-05-07 RX ADMIN — SODIUM CHLORIDE: 9 INJECTION, SOLUTION INTRAVENOUS at 23:34

## 2024-05-07 RX ADMIN — MORPHINE SULFATE 4 MG: 4 INJECTION, SOLUTION INTRAMUSCULAR; INTRAVENOUS at 06:56

## 2024-05-07 RX ADMIN — HYDROCODONE BITARTRATE AND ACETAMINOPHEN 1 TABLET: 10; 325 TABLET ORAL at 12:14

## 2024-05-07 ASSESSMENT — PAIN SCALES - GENERAL
PAINLEVEL_OUTOF10: 2
PAINLEVEL_OUTOF10: 3
PAINLEVEL_OUTOF10: 5
PAINLEVEL_OUTOF10: 7
PAINLEVEL_OUTOF10: 8
PAINLEVEL_OUTOF10: 4
PAINLEVEL_OUTOF10: 7
PAINLEVEL_OUTOF10: 6
PAINLEVEL_OUTOF10: 10
PAINLEVEL_OUTOF10: 2
PAINLEVEL_OUTOF10: 10
PAINLEVEL_OUTOF10: 7

## 2024-05-07 ASSESSMENT — PAIN DESCRIPTION - ORIENTATION
ORIENTATION: LEFT

## 2024-05-07 ASSESSMENT — PAIN - FUNCTIONAL ASSESSMENT
PAIN_FUNCTIONAL_ASSESSMENT: NONE - DENIES PAIN
PAIN_FUNCTIONAL_ASSESSMENT: NONE - DENIES PAIN
PAIN_FUNCTIONAL_ASSESSMENT: 0-10
PAIN_FUNCTIONAL_ASSESSMENT: 0-10

## 2024-05-07 ASSESSMENT — PAIN DESCRIPTION - LOCATION
LOCATION: LEG

## 2024-05-07 ASSESSMENT — PAIN DESCRIPTION - DESCRIPTORS
DESCRIPTORS: ACHING
DESCRIPTORS: ACHING;DISCOMFORT
DESCRIPTORS: ACHING
DESCRIPTORS: ACHING

## 2024-05-07 NOTE — RT PROTOCOL NOTE
RT Inhaler-Nebulizer Bronchodilator Protocol Note    There is a bronchodilator order in the chart from a provider indicating to follow the RT Bronchodilator Protocol and there is an “Initiate RT Inhaler-Nebulizer Bronchodilator Protocol” order as well (see protocol at bottom of note).    CXR Findings:  No results found.    The findings from the last RT Protocol Assessment were as follows:   History Pulmonary Disease: Smoker 15 pack years or more  Respiratory Pattern: Regular pattern and RR 12-20 bpm  Breath Sounds: Clear breath sounds  Cough: Strong, spontaneous, non-productive  Indication for Bronchodilator Therapy: On home bronchodilators  Bronchodilator Assessment Score: 1    Aerosolized bronchodilator medication orders have been revised according to the RT Inhaler-Nebulizer Bronchodilator Protocol below.    Respiratory Therapist to perform RT Therapy Protocol Assessment initially then follow the protocol.  Repeat RT Therapy Protocol Assessment PRN for score 0-3 or on second treatment, BID, and PRN for scores above 3.    No Indications - adjust the frequency to every 6 hours PRN wheezing or bronchospasm, if no treatments needed after 48 hours then discontinue using Per Protocol order mode.     If indication present, adjust the RT bronchodilator orders based on the Bronchodilator Assessment Score as indicated below.  Use Inhaler orders unless patient has one or more of the following: on home nebulizer, not able to hold breath for 10 seconds, is not alert and oriented, cannot activate and use MDI correctly, or respiratory rate 25 breaths per minute or more, then use the equivalent nebulizer order(s) with same Frequency and PRN reasons based on the score.  If a patient is on this medication at home then do not decrease Frequency below that used at home.    0-3 - enter or revise RT bronchodilator order(s) to equivalent RT Bronchodilator order with Frequency of every 4 hours PRN for wheezing or increased work of

## 2024-05-07 NOTE — ED PROVIDER NOTES
None    Clinical information obtained from an independent historian.     Disposition Considerations (include 1 Tests not done, Shared Decision Making, Pt Expectation of Test or Tx.):       I am the Primary Clinician of Record.    FINAL IMPRESSION      1. Closed displaced spiral fracture of shaft of left tibia, initial encounter    2. Fall, initial encounter          DISPOSITION/PLAN     DISPOSITION Admitted 05/07/2024 07:37:41 AM      PATIENT REFERRED TO:  No follow-up provider specified.    DISCHARGE MEDICATIONS:  Patient was given scripts for the following medications. I counseled patient how to take these medications:  New Prescriptions    No medications on file       DISCONTINUED MEDICATIONS:  Discontinued Medications    No medications on file              (This chart was generated in part by using Dragon Dictation system and may contain errors related to that system including errors in grammar, punctuation, and spelling, as well as words and phrases that may be inappropriate. If there are any questions or concerns please feel free to contact the dictating provider for clarification.)    MD Ok Zazueta Benjamin, MD  05/07/24 7662

## 2024-05-07 NOTE — CARE COORDINATION
Case Management Assessment  Initial Evaluation    Date/Time of Evaluation: 5/7/2024 12:32 PM  Assessment Completed by: Justine Reyes RN    If patient is discharged prior to next notation, then this note serves as note for discharge by case management.    Patient Name: Radha Malin                   YOB: 1960  Diagnosis: Fall, initial encounter [W19.XXXA]  Closed displaced spiral fracture of shaft of left tibia, initial encounter [S82.242A]  Fall at home, initial encounter [W19.XXXA, Y92.009]                   Date / Time: 5/7/2024  6:01 AM    Patient Admission Status: Inpatient   Readmission Risk (Low < 19, Mod (19-27), High > 27): Readmission Risk Score: 11.6    Current PCP: Terrell Perez, DO  PCP verified by CM? Yes    Chart Reviewed: Yes      History Provided by: Patient  Patient Orientation: Alert and Oriented    Patient Cognition: Alert    Hospitalization in the last 30 days (Readmission):  No    If yes, Readmission Assessment in CM Navigator will be completed.    Advance Directives:      Code Status: Full Code   Patient's Primary Decision Maker is: Named in Scanned ACP Document    Primary Decision Maker: Matthew Mahmood - Domestic Partner - [phone]    Secondary Decision Maker: Taina Lou - Brother/Sister - [phone]    Discharge Planning:    Patient lives with: Spouse/Significant Other Type of Home: House  Primary Care Giver: Self  Patient Support Systems include: Spouse/Significant Other, Family Members   Current Financial resources: Medicare  Current community resources: None  Current services prior to admission: None            Current DME:              Type of Home Care services:  None    ADLS  Prior functional level: Independent in ADLs/IADLs  Current functional level: Assistance with the following:, Mobility, Bathing, Cooking, Housework, Shopping    PT AM-PAC:   /24  OT AM-PAC:   /24    Family can provide assistance at DC: Yes  Would you like Case Management to discuss the

## 2024-05-08 ENCOUNTER — APPOINTMENT (OUTPATIENT)
Dept: GENERAL RADIOLOGY | Age: 64
DRG: 493 | End: 2024-05-08
Payer: MEDICARE

## 2024-05-08 ENCOUNTER — ANESTHESIA (OUTPATIENT)
Dept: OPERATING ROOM | Age: 64
End: 2024-05-08
Payer: MEDICARE

## 2024-05-08 LAB
ANION GAP SERPL CALCULATED.3IONS-SCNC: 7 MMOL/L (ref 3–16)
BASOPHILS # BLD: 0.1 K/UL (ref 0–0.2)
BASOPHILS NFR BLD: 0.6 %
BUN SERPL-MCNC: 8 MG/DL (ref 7–20)
CALCIUM SERPL-MCNC: 8.6 MG/DL (ref 8.3–10.6)
CHLORIDE SERPL-SCNC: 106 MMOL/L (ref 99–110)
CO2 SERPL-SCNC: 24 MMOL/L (ref 21–32)
CREAT SERPL-MCNC: 0.6 MG/DL (ref 0.6–1.2)
DEPRECATED RDW RBC AUTO: 13.9 % (ref 12.4–15.4)
EOSINOPHIL # BLD: 0.1 K/UL (ref 0–0.6)
EOSINOPHIL NFR BLD: 1.2 %
GFR SERPLBLD CREATININE-BSD FMLA CKD-EPI: >90 ML/MIN/{1.73_M2}
GLUCOSE SERPL-MCNC: 113 MG/DL (ref 70–99)
HCT VFR BLD AUTO: 23.2 % (ref 36–48)
HGB BLD-MCNC: 7.6 G/DL (ref 12–16)
LYMPHOCYTES # BLD: 2.2 K/UL (ref 1–5.1)
LYMPHOCYTES NFR BLD: 23.1 %
MCH RBC QN AUTO: 29 PG (ref 26–34)
MCHC RBC AUTO-ENTMCNC: 32.7 G/DL (ref 31–36)
MCV RBC AUTO: 88.8 FL (ref 80–100)
MONOCYTES # BLD: 0.8 K/UL (ref 0–1.3)
MONOCYTES NFR BLD: 8.5 %
NEUTROPHILS # BLD: 6.4 K/UL (ref 1.7–7.7)
NEUTROPHILS NFR BLD: 66.6 %
PLATELET # BLD AUTO: 397 K/UL (ref 135–450)
PMV BLD AUTO: 6.7 FL (ref 5–10.5)
POTASSIUM SERPL-SCNC: 3.8 MMOL/L (ref 3.5–5.1)
RBC # BLD AUTO: 2.61 M/UL (ref 4–5.2)
SODIUM SERPL-SCNC: 137 MMOL/L (ref 136–145)
WBC # BLD AUTO: 9.5 K/UL (ref 4–11)

## 2024-05-08 PROCEDURE — 2580000003 HC RX 258: Performed by: NURSE ANESTHETIST, CERTIFIED REGISTERED

## 2024-05-08 PROCEDURE — 85025 COMPLETE CBC W/AUTO DIFF WBC: CPT

## 2024-05-08 PROCEDURE — 73590 X-RAY EXAM OF LOWER LEG: CPT

## 2024-05-08 PROCEDURE — 2580000003 HC RX 258: Performed by: ORTHOPAEDIC SURGERY

## 2024-05-08 PROCEDURE — 2720000010 HC SURG SUPPLY STERILE: Performed by: ORTHOPAEDIC SURGERY

## 2024-05-08 PROCEDURE — 6370000000 HC RX 637 (ALT 250 FOR IP): Performed by: ORTHOPAEDIC SURGERY

## 2024-05-08 PROCEDURE — 3600000005 HC SURGERY LEVEL 5 BASE: Performed by: ORTHOPAEDIC SURGERY

## 2024-05-08 PROCEDURE — 6360000002 HC RX W HCPCS: Performed by: ANESTHESIOLOGY

## 2024-05-08 PROCEDURE — 27759 TREATMENT OF TIBIA FRACTURE: CPT | Performed by: ORTHOPAEDIC SURGERY

## 2024-05-08 PROCEDURE — 2709999900 HC NON-CHARGEABLE SUPPLY: Performed by: ORTHOPAEDIC SURGERY

## 2024-05-08 PROCEDURE — 6370000000 HC RX 637 (ALT 250 FOR IP): Performed by: SPECIALIST/TECHNOLOGIST

## 2024-05-08 PROCEDURE — 1200000000 HC SEMI PRIVATE

## 2024-05-08 PROCEDURE — 94761 N-INVAS EAR/PLS OXIMETRY MLT: CPT

## 2024-05-08 PROCEDURE — 6360000002 HC RX W HCPCS: Performed by: NURSE ANESTHETIST, CERTIFIED REGISTERED

## 2024-05-08 PROCEDURE — 2500000003 HC RX 250 WO HCPCS: Performed by: NURSE ANESTHETIST, CERTIFIED REGISTERED

## 2024-05-08 PROCEDURE — C1713 ANCHOR/SCREW BN/BN,TIS/BN: HCPCS | Performed by: ORTHOPAEDIC SURGERY

## 2024-05-08 PROCEDURE — 6370000000 HC RX 637 (ALT 250 FOR IP): Performed by: ANESTHESIOLOGY

## 2024-05-08 PROCEDURE — 6360000002 HC RX W HCPCS: Performed by: INTERNAL MEDICINE

## 2024-05-08 PROCEDURE — 7100000000 HC PACU RECOVERY - FIRST 15 MIN: Performed by: ORTHOPAEDIC SURGERY

## 2024-05-08 PROCEDURE — 6370000000 HC RX 637 (ALT 250 FOR IP): Performed by: INTERNAL MEDICINE

## 2024-05-08 PROCEDURE — 0QSH06Z REPOSITION LEFT TIBIA WITH INTRAMEDULLARY INTERNAL FIXATION DEVICE, OPEN APPROACH: ICD-10-PCS | Performed by: ORTHOPAEDIC SURGERY

## 2024-05-08 PROCEDURE — 80048 BASIC METABOLIC PNL TOTAL CA: CPT

## 2024-05-08 PROCEDURE — 6370000000 HC RX 637 (ALT 250 FOR IP): Performed by: NURSE PRACTITIONER

## 2024-05-08 PROCEDURE — 36415 COLL VENOUS BLD VENIPUNCTURE: CPT

## 2024-05-08 PROCEDURE — C1769 GUIDE WIRE: HCPCS | Performed by: ORTHOPAEDIC SURGERY

## 2024-05-08 PROCEDURE — 6360000002 HC RX W HCPCS: Performed by: ORTHOPAEDIC SURGERY

## 2024-05-08 PROCEDURE — 3700000001 HC ADD 15 MINUTES (ANESTHESIA): Performed by: ORTHOPAEDIC SURGERY

## 2024-05-08 PROCEDURE — A4217 STERILE WATER/SALINE, 500 ML: HCPCS | Performed by: ORTHOPAEDIC SURGERY

## 2024-05-08 PROCEDURE — 7100000001 HC PACU RECOVERY - ADDTL 15 MIN: Performed by: ORTHOPAEDIC SURGERY

## 2024-05-08 PROCEDURE — 6360000002 HC RX W HCPCS: Performed by: SPECIALIST/TECHNOLOGIST

## 2024-05-08 PROCEDURE — 3600000015 HC SURGERY LEVEL 5 ADDTL 15MIN: Performed by: ORTHOPAEDIC SURGERY

## 2024-05-08 PROCEDURE — 2700000000 HC OXYGEN THERAPY PER DAY

## 2024-05-08 PROCEDURE — 3700000000 HC ANESTHESIA ATTENDED CARE: Performed by: ORTHOPAEDIC SURGERY

## 2024-05-08 DEVICE — SCREW LCK F/IM NAIL 5X40MM XL25 STR: Type: IMPLANTABLE DEVICE | Site: LEG | Status: FUNCTIONAL

## 2024-05-08 DEVICE — NAIL TIBIAL ADV 10MMX 330MM ST: Type: IMPLANTABLE DEVICE | Site: LEG | Status: FUNCTIONAL

## 2024-05-08 DEVICE — SCREW LK FOR IM NAIL 5X28MM XL25 SILX: Type: IMPLANTABLE DEVICE | Site: LEG | Status: FUNCTIONAL

## 2024-05-08 DEVICE — SCREW LK F/IM NAIL 5X42MM XL25 STERILE: Type: IMPLANTABLE DEVICE | Site: LEG | Status: FUNCTIONAL

## 2024-05-08 DEVICE — SCREW LK F/IM NAIL 5X30MM XL25 ST: Type: IMPLANTABLE DEVICE | Site: LEG | Status: FUNCTIONAL

## 2024-05-08 DEVICE — SCREW LK F/IM NAIL 5X36MM XL25 ST: Type: IMPLANTABLE DEVICE | Site: LEG | Status: FUNCTIONAL

## 2024-05-08 RX ORDER — ROCURONIUM BROMIDE 10 MG/ML
INJECTION, SOLUTION INTRAVENOUS PRN
Status: DISCONTINUED | OUTPATIENT
Start: 2024-05-08 | End: 2024-05-08 | Stop reason: SDUPTHER

## 2024-05-08 RX ORDER — SODIUM CHLORIDE 9 MG/ML
INJECTION, SOLUTION INTRAVENOUS PRN
Status: DISCONTINUED | OUTPATIENT
Start: 2024-05-08 | End: 2024-05-08 | Stop reason: HOSPADM

## 2024-05-08 RX ORDER — ONDANSETRON 2 MG/ML
INJECTION INTRAMUSCULAR; INTRAVENOUS PRN
Status: DISCONTINUED | OUTPATIENT
Start: 2024-05-08 | End: 2024-05-08 | Stop reason: SDUPTHER

## 2024-05-08 RX ORDER — MIDAZOLAM HYDROCHLORIDE 1 MG/ML
INJECTION INTRAMUSCULAR; INTRAVENOUS PRN
Status: DISCONTINUED | OUTPATIENT
Start: 2024-05-08 | End: 2024-05-08 | Stop reason: SDUPTHER

## 2024-05-08 RX ORDER — OXYCODONE HYDROCHLORIDE 5 MG/1
10 TABLET ORAL PRN
Status: COMPLETED | OUTPATIENT
Start: 2024-05-08 | End: 2024-05-08

## 2024-05-08 RX ORDER — MIDODRINE HYDROCHLORIDE 5 MG/1
5 TABLET ORAL
Status: DISCONTINUED | OUTPATIENT
Start: 2024-05-08 | End: 2024-05-14 | Stop reason: HOSPADM

## 2024-05-08 RX ORDER — OXYCODONE HYDROCHLORIDE 5 MG/1
5 TABLET ORAL PRN
Status: COMPLETED | OUTPATIENT
Start: 2024-05-08 | End: 2024-05-08

## 2024-05-08 RX ORDER — MAGNESIUM HYDROXIDE 1200 MG/15ML
LIQUID ORAL CONTINUOUS PRN
Status: COMPLETED | OUTPATIENT
Start: 2024-05-08 | End: 2024-05-08

## 2024-05-08 RX ORDER — SODIUM CHLORIDE 0.9 % (FLUSH) 0.9 %
5-40 SYRINGE (ML) INJECTION PRN
Status: DISCONTINUED | OUTPATIENT
Start: 2024-05-08 | End: 2024-05-08 | Stop reason: HOSPADM

## 2024-05-08 RX ORDER — LIDOCAINE HYDROCHLORIDE 20 MG/ML
INJECTION, SOLUTION INFILTRATION; PERINEURAL PRN
Status: DISCONTINUED | OUTPATIENT
Start: 2024-05-08 | End: 2024-05-08 | Stop reason: SDUPTHER

## 2024-05-08 RX ORDER — MEPERIDINE HYDROCHLORIDE 50 MG/ML
12.5 INJECTION INTRAMUSCULAR; INTRAVENOUS; SUBCUTANEOUS EVERY 5 MIN PRN
Status: DISCONTINUED | OUTPATIENT
Start: 2024-05-08 | End: 2024-05-08 | Stop reason: HOSPADM

## 2024-05-08 RX ORDER — FENTANYL CITRATE 50 UG/ML
INJECTION, SOLUTION INTRAMUSCULAR; INTRAVENOUS PRN
Status: DISCONTINUED | OUTPATIENT
Start: 2024-05-08 | End: 2024-05-08 | Stop reason: SDUPTHER

## 2024-05-08 RX ORDER — TRANEXAMIC ACID 100 MG/ML
INJECTION, SOLUTION INTRAVENOUS PRN
Status: DISCONTINUED | OUTPATIENT
Start: 2024-05-08 | End: 2024-05-08 | Stop reason: SDUPTHER

## 2024-05-08 RX ORDER — PROPOFOL 10 MG/ML
INJECTION, EMULSION INTRAVENOUS PRN
Status: DISCONTINUED | OUTPATIENT
Start: 2024-05-08 | End: 2024-05-08 | Stop reason: SDUPTHER

## 2024-05-08 RX ORDER — DEXAMETHASONE SODIUM PHOSPHATE 4 MG/ML
INJECTION, SOLUTION INTRA-ARTICULAR; INTRALESIONAL; INTRAMUSCULAR; INTRAVENOUS; SOFT TISSUE PRN
Status: DISCONTINUED | OUTPATIENT
Start: 2024-05-08 | End: 2024-05-08 | Stop reason: SDUPTHER

## 2024-05-08 RX ORDER — LABETALOL HYDROCHLORIDE 5 MG/ML
10 INJECTION, SOLUTION INTRAVENOUS
Status: DISCONTINUED | OUTPATIENT
Start: 2024-05-08 | End: 2024-05-08 | Stop reason: HOSPADM

## 2024-05-08 RX ORDER — BUPIVACAINE HYDROCHLORIDE 5 MG/ML
INJECTION, SOLUTION EPIDURAL; INTRACAUDAL PRN
Status: DISCONTINUED | OUTPATIENT
Start: 2024-05-08 | End: 2024-05-08 | Stop reason: ALTCHOICE

## 2024-05-08 RX ORDER — PHENYLEPHRINE HCL IN 0.9% NACL 1 MG/10 ML
SYRINGE (ML) INTRAVENOUS PRN
Status: DISCONTINUED | OUTPATIENT
Start: 2024-05-08 | End: 2024-05-08 | Stop reason: SDUPTHER

## 2024-05-08 RX ORDER — ONDANSETRON 4 MG/1
4 TABLET, ORALLY DISINTEGRATING ORAL EVERY 8 HOURS PRN
Status: DISCONTINUED | OUTPATIENT
Start: 2024-05-08 | End: 2024-05-08 | Stop reason: SDUPTHER

## 2024-05-08 RX ORDER — SODIUM CHLORIDE 0.9 % (FLUSH) 0.9 %
5-40 SYRINGE (ML) INJECTION PRN
Status: DISCONTINUED | OUTPATIENT
Start: 2024-05-08 | End: 2024-05-14 | Stop reason: SDUPTHER

## 2024-05-08 RX ORDER — SODIUM CHLORIDE 0.9 % (FLUSH) 0.9 %
5-40 SYRINGE (ML) INJECTION EVERY 12 HOURS SCHEDULED
Status: DISCONTINUED | OUTPATIENT
Start: 2024-05-08 | End: 2024-05-08 | Stop reason: HOSPADM

## 2024-05-08 RX ORDER — CEFAZOLIN SODIUM IN 0.9 % NACL 2 G/100 ML
2000 PLASTIC BAG, INJECTION (ML) INTRAVENOUS EVERY 8 HOURS
Status: COMPLETED | OUTPATIENT
Start: 2024-05-09 | End: 2024-05-09

## 2024-05-08 RX ORDER — DIPHENHYDRAMINE HYDROCHLORIDE 50 MG/ML
12.5 INJECTION INTRAMUSCULAR; INTRAVENOUS
Status: DISCONTINUED | OUTPATIENT
Start: 2024-05-08 | End: 2024-05-08 | Stop reason: HOSPADM

## 2024-05-08 RX ORDER — NALOXONE HYDROCHLORIDE 0.4 MG/ML
INJECTION, SOLUTION INTRAMUSCULAR; INTRAVENOUS; SUBCUTANEOUS PRN
Status: DISCONTINUED | OUTPATIENT
Start: 2024-05-08 | End: 2024-05-08 | Stop reason: HOSPADM

## 2024-05-08 RX ORDER — SODIUM CHLORIDE, SODIUM LACTATE, POTASSIUM CHLORIDE, CALCIUM CHLORIDE 600; 310; 30; 20 MG/100ML; MG/100ML; MG/100ML; MG/100ML
INJECTION, SOLUTION INTRAVENOUS CONTINUOUS PRN
Status: DISCONTINUED | OUTPATIENT
Start: 2024-05-08 | End: 2024-05-08 | Stop reason: SDUPTHER

## 2024-05-08 RX ORDER — SODIUM CHLORIDE 0.9 % (FLUSH) 0.9 %
5-40 SYRINGE (ML) INJECTION EVERY 12 HOURS SCHEDULED
Status: DISCONTINUED | OUTPATIENT
Start: 2024-05-08 | End: 2024-05-14 | Stop reason: SDUPTHER

## 2024-05-08 RX ORDER — ONDANSETRON 2 MG/ML
4 INJECTION INTRAMUSCULAR; INTRAVENOUS EVERY 6 HOURS PRN
Status: DISCONTINUED | OUTPATIENT
Start: 2024-05-08 | End: 2024-05-08 | Stop reason: SDUPTHER

## 2024-05-08 RX ORDER — SODIUM CHLORIDE 9 MG/ML
INJECTION, SOLUTION INTRAVENOUS PRN
Status: DISCONTINUED | OUTPATIENT
Start: 2024-05-08 | End: 2024-05-14 | Stop reason: HOSPADM

## 2024-05-08 RX ORDER — HYDROMORPHONE HYDROCHLORIDE 2 MG/ML
INJECTION, SOLUTION INTRAMUSCULAR; INTRAVENOUS; SUBCUTANEOUS PRN
Status: DISCONTINUED | OUTPATIENT
Start: 2024-05-08 | End: 2024-05-08 | Stop reason: SDUPTHER

## 2024-05-08 RX ORDER — ONDANSETRON 2 MG/ML
4 INJECTION INTRAMUSCULAR; INTRAVENOUS
Status: DISCONTINUED | OUTPATIENT
Start: 2024-05-08 | End: 2024-05-08 | Stop reason: HOSPADM

## 2024-05-08 RX ORDER — ENOXAPARIN SODIUM 100 MG/ML
40 INJECTION SUBCUTANEOUS DAILY
Status: DISCONTINUED | OUTPATIENT
Start: 2024-05-09 | End: 2024-05-14 | Stop reason: HOSPADM

## 2024-05-08 RX ADMIN — SODIUM CHLORIDE, PRESERVATIVE FREE 10 ML: 5 INJECTION INTRAVENOUS at 21:25

## 2024-05-08 RX ADMIN — TRANEXAMIC ACID 1000 MG: 100 INJECTION, SOLUTION INTRAVENOUS at 16:08

## 2024-05-08 RX ADMIN — SUGAMMADEX 200 MG: 100 INJECTION, SOLUTION INTRAVENOUS at 17:30

## 2024-05-08 RX ADMIN — MORPHINE SULFATE 4 MG: 4 INJECTION, SOLUTION INTRAMUSCULAR; INTRAVENOUS at 05:03

## 2024-05-08 RX ADMIN — SODIUM CHLORIDE, SODIUM LACTATE, POTASSIUM CHLORIDE, AND CALCIUM CHLORIDE: .6; .31; .03; .02 INJECTION, SOLUTION INTRAVENOUS at 16:49

## 2024-05-08 RX ADMIN — HYDROMORPHONE HYDROCHLORIDE 0.5 MG: 2 INJECTION, SOLUTION INTRAMUSCULAR; INTRAVENOUS; SUBCUTANEOUS at 17:29

## 2024-05-08 RX ADMIN — MIDAZOLAM 2 MG: 1 INJECTION INTRAMUSCULAR; INTRAVENOUS at 15:45

## 2024-05-08 RX ADMIN — PANTOPRAZOLE SODIUM 40 MG: 40 TABLET, DELAYED RELEASE ORAL at 05:03

## 2024-05-08 RX ADMIN — Medication 100 MCG: at 16:00

## 2024-05-08 RX ADMIN — Medication 1 AMPULE: at 14:07

## 2024-05-08 RX ADMIN — OXYCODONE 10 MG: 5 TABLET ORAL at 18:04

## 2024-05-08 RX ADMIN — FENTANYL CITRATE 50 MCG: 50 INJECTION, SOLUTION INTRAMUSCULAR; INTRAVENOUS at 15:52

## 2024-05-08 RX ADMIN — AMITRIPTYLINE HYDROCHLORIDE 25 MG: 25 TABLET, FILM COATED ORAL at 21:25

## 2024-05-08 RX ADMIN — FENTANYL CITRATE 50 MCG: 50 INJECTION, SOLUTION INTRAMUSCULAR; INTRAVENOUS at 16:30

## 2024-05-08 RX ADMIN — METHOCARBAMOL 200 MG: 100 INJECTION INTRAMUSCULAR; INTRAVENOUS at 16:35

## 2024-05-08 RX ADMIN — Medication 200 MCG: at 16:08

## 2024-05-08 RX ADMIN — HYDROCODONE BITARTRATE AND ACETAMINOPHEN 1 TABLET: 10; 325 TABLET ORAL at 11:52

## 2024-05-08 RX ADMIN — MORPHINE SULFATE 2 MG: 2 INJECTION, SOLUTION INTRAMUSCULAR; INTRAVENOUS at 15:32

## 2024-05-08 RX ADMIN — SODIUM CHLORIDE, SODIUM LACTATE, POTASSIUM CHLORIDE, AND CALCIUM CHLORIDE: .6; .31; .03; .02 INJECTION, SOLUTION INTRAVENOUS at 15:10

## 2024-05-08 RX ADMIN — METHOCARBAMOL 300 MG: 100 INJECTION INTRAMUSCULAR; INTRAVENOUS at 16:24

## 2024-05-08 RX ADMIN — HYDROMORPHONE HYDROCHLORIDE 0.5 MG: 2 INJECTION, SOLUTION INTRAMUSCULAR; INTRAVENOUS; SUBCUTANEOUS at 17:34

## 2024-05-08 RX ADMIN — Medication 100 MCG: at 16:38

## 2024-05-08 RX ADMIN — ROCURONIUM BROMIDE 50 MG: 50 INJECTION, SOLUTION INTRAVENOUS at 15:53

## 2024-05-08 RX ADMIN — HYDROMORPHONE HYDROCHLORIDE 0.5 MG: 1 INJECTION, SOLUTION INTRAMUSCULAR; INTRAVENOUS; SUBCUTANEOUS at 17:55

## 2024-05-08 RX ADMIN — Medication 2000 MG: at 15:47

## 2024-05-08 RX ADMIN — PROPOFOL 150 MG: 10 INJECTION, EMULSION INTRAVENOUS at 15:53

## 2024-05-08 RX ADMIN — HYDROMORPHONE HYDROCHLORIDE 0.5 MG: 1 INJECTION, SOLUTION INTRAMUSCULAR; INTRAVENOUS; SUBCUTANEOUS at 17:47

## 2024-05-08 RX ADMIN — DEXAMETHASONE SODIUM PHOSPHATE 8 MG: 4 INJECTION, SOLUTION INTRAMUSCULAR; INTRAVENOUS at 16:12

## 2024-05-08 RX ADMIN — FENTANYL CITRATE 50 MCG: 50 INJECTION, SOLUTION INTRAMUSCULAR; INTRAVENOUS at 15:48

## 2024-05-08 RX ADMIN — FENTANYL CITRATE 50 MCG: 50 INJECTION, SOLUTION INTRAMUSCULAR; INTRAVENOUS at 16:23

## 2024-05-08 RX ADMIN — SODIUM CHLORIDE: 9 INJECTION, SOLUTION INTRAVENOUS at 21:24

## 2024-05-08 RX ADMIN — LIDOCAINE HYDROCHLORIDE 60 MG: 20 INJECTION, SOLUTION INFILTRATION; PERINEURAL at 15:53

## 2024-05-08 RX ADMIN — HYDROMORPHONE HYDROCHLORIDE 0.5 MG: 2 INJECTION, SOLUTION INTRAMUSCULAR; INTRAVENOUS; SUBCUTANEOUS at 17:23

## 2024-05-08 RX ADMIN — MIDODRINE HYDROCHLORIDE 5 MG: 5 TABLET ORAL at 12:18

## 2024-05-08 RX ADMIN — ONDANSETRON 4 MG: 2 INJECTION INTRAMUSCULAR; INTRAVENOUS at 16:12

## 2024-05-08 RX ADMIN — Medication 2 AMPULE: at 14:52

## 2024-05-08 ASSESSMENT — PAIN SCALES - GENERAL
PAINLEVEL_OUTOF10: 8
PAINLEVEL_OUTOF10: 6
PAINLEVEL_OUTOF10: 8
PAINLEVEL_OUTOF10: 8
PAINLEVEL_OUTOF10: 7

## 2024-05-08 ASSESSMENT — PAIN DESCRIPTION - LOCATION
LOCATION: LEG

## 2024-05-08 ASSESSMENT — PAIN DESCRIPTION - ORIENTATION
ORIENTATION: LEFT

## 2024-05-08 ASSESSMENT — PAIN - FUNCTIONAL ASSESSMENT: PAIN_FUNCTIONAL_ASSESSMENT: 0-10

## 2024-05-08 ASSESSMENT — PAIN DESCRIPTION - DESCRIPTORS: DESCRIPTORS: ACHING

## 2024-05-08 NOTE — OP NOTE
measuring, and inserting the screws using the outrigger guide for guidance.  An attempt was made to place a dynamic screw, however bone quality in this area did not allow for appropriate cortical fixation and therefore the screw was removed.  Once this had been interlocked with 2 proximal interlocking screws, the outrigger guide was removed.  Final C-arm fluoroscopy imaging demonstrating near anatomic overall alignment and appropriate hardware placement was saved.  Next, wounds were copiously irrigated with sterile saline solution using bulb syringe.  The wounds were closed in a layered fashion using 0 Vicryl, 2-0 Vicryl, 3-0 nylon suture.  Wounds were anesthetized with Marcaine for postoperative pain control.  Sterile soft dressing was applied.  Patient was awakened from general anesthesia and transitioned back to the hospital bed.  She was taken to the postoperative recovery area in apparent stable condition.  There are no immediate complications.  All sponge and needle counts were correct.  Clinically, rotational alignment appeared identical to the contralateral limb.    Postoperative plan:  Toe-touch weightbearing left lower extremity  Pain control  Knee and ankle range of motion as tolerated  Ice and elevation to limit swelling.  DVT prevention: Resume Lovenox tomorrow morning POD #1.  Check morning hemoglobin related to preoperative anemia and anticipated surgical blood loss.  PT/OT eval  Anticipate 6 weeks toe-touch weightbearing restrictions prior to proceeding with full weightbearing as tolerated  Can commence daily dressing changes POD #3 and beyond  Anticipate discharge pending pain control, medical stability, progress with therapy in 1 to 3 days.  Anticipate need for postoperative rehab facility.    Electronically signed by Vasile Antunez MD on 5/8/2024 at 7:38 PM

## 2024-05-08 NOTE — CARE COORDINATION
Writer reviewed chart and spoke with RN, patient is going to the OR this day @ 1500, will need PT/OT recs when medically ready. Patient plans to return home with her spouse.     Justine Reyes RN

## 2024-05-08 NOTE — ANESTHESIA POSTPROCEDURE EVALUATION
Department of Anesthesiology  Postprocedure Note    Patient: Radha Malin  MRN: [id number]  YOB: 1960  Date of evaluation: 5/8/2024    Procedure Summary       Date: 05/08/24 Room / Location: [address]    Anesthesia Start: 1545 Anesthesia Stop: 1739    Procedure: TIBIA INTRAMEDULLARY NAIL INSERTION (Left: Leg Lower) Diagnosis:       Closed displaced comminuted fracture of shaft of left tibia, initial encounter      (Closed displaced comminuted fracture of shaft of left tibia, initial encounter [S82.252A])    Surgeons: Vasile Antunez MD Responsible Provider: Matthew Jacques MD    Anesthesia Type: general ASA Status: 3            Anesthesia Type: No value filed.    Uriel Phase I: Uriel Score: 9    Uriel Phase II:      Anesthesia Post Evaluation    Patient location during evaluation: PACU  Patient participation: complete - patient participated  Level of consciousness: awake and alert  Airway patency: patent  Nausea & Vomiting: no nausea and no vomiting  Cardiovascular status: blood pressure returned to baseline  Respiratory status: acceptable  Hydration status: euvolemic  Comments: VSS on transfer to phase 2 recovery.  No anesthetic complications.  Pain management: adequate    No notable events documented.   sacroiliac L/sacroiliac R/lumbar spine

## 2024-05-08 NOTE — ANESTHESIA PRE PROCEDURE
Department of Anesthesiology  Preprocedure Note       Name:  Radha Malin   Age:  63 y.o.  :  1960                                          MRN:  [id number]         Date:  2024      Surgeon: Surgeon(s):  Vasile Antunez MD    Procedure: Procedure(s):  TIBIA INTRAMEDULLARY NAIL INSERTION    Medications prior to admission:   Prior to Admission medications    Medication Sig Start Date End Date Taking? Authorizing Provider   aspirin 81 MG EC tablet Take 1 tablet by mouth daily This was held for surgery    Magdalene Youssef MD   morphine (MSIR) 15 MG tablet Take 1 tablet by mouth as needed for Pain.    Magdalene Youssef MD   mirabegron (MYRBETRIQ) 50 MG TB24 Take 50 mg by mouth daily    Magdalene Youssef MD   meloxicam (MOBIC) 7.5 MG tablet Take 1 tablet by mouth daily    Magdalene Youssef MD   HYDROcodone-acetaminophen (NORCO)  MG per tablet Take 1 tablet by mouth 5 times daily. PRN    Magdalene Youssef MD   tiZANidine (ZANAFLEX) 4 MG tablet Take 1 tablet by mouth every 6 hours as needed    Magdalene Youssef MD   gabapentin (NEURONTIN) 400 MG capsule Take 600 mg by mouth in the morning and at bedtime.  Patient not taking: Reported on 2024    Magdalene Youssef MD   omeprazole (PRILOSEC) 20 MG delayed release capsule Take 1 capsule by mouth Daily    Magdalene Youssef MD   traZODone (DESYREL) 100 MG tablet Take 5 tablets by mouth nightly    Magdalene Youssef MD   amitriptyline (ELAVIL) 25 MG tablet Take 1 tablet by mouth nightly    Magdalene Youssef MD   diclofenac sodium 1 % GEL Apply 2 g topically 2 times daily as needed    Magdalene Youssef MD   alendronate (FOSAMAX) 70 MG tablet Take 1 tablet by mouth every 7 days  Patient not taking: Reported on 2024    Magdalene Youssef MD   albuterol sulfate  (90 Base) MCG/ACT inhaler Inhale 2 puffs into the lungs every 6 hours as needed for Wheezing    Magdalene Youssef MD   ondansetron (ZOFRAN

## 2024-05-08 NOTE — PLAN OF CARE
Ortho Note  Patient had a breast reduction 5/6/24, has a binder and bandages around her chest.  She DOES NOT want these removed before her Ortho surgery.    Discussed with Dr Del Antunez, OK to leave binder and bandages in place     Kendra Puga PA-C

## 2024-05-08 NOTE — PLAN OF CARE
Problem: Discharge Planning  Goal: Discharge to home or other facility with appropriate resources  5/8/2024 1456 by Hazel Rdz RN  Outcome: Progressing     Problem: Pain  Goal: Verbalizes/displays adequate comfort level or baseline comfort level  5/8/2024 1456 by Hazel Rdz RN  Outcome: Progressing     Problem: Safety - Adult  Goal: Free from fall injury  5/8/2024 1456 by Hazel Rdz RN  Outcome: Progressing

## 2024-05-09 LAB
ANION GAP SERPL CALCULATED.3IONS-SCNC: 10 MMOL/L (ref 3–16)
BASOPHILS # BLD: 0 K/UL (ref 0–0.2)
BASOPHILS NFR BLD: 0.1 %
BUN SERPL-MCNC: 6 MG/DL (ref 7–20)
CALCIUM SERPL-MCNC: 8.8 MG/DL (ref 8.3–10.6)
CHLORIDE SERPL-SCNC: 103 MMOL/L (ref 99–110)
CO2 SERPL-SCNC: 25 MMOL/L (ref 21–32)
CREAT SERPL-MCNC: <0.5 MG/DL (ref 0.6–1.2)
DEPRECATED RDW RBC AUTO: 13.4 % (ref 12.4–15.4)
EOSINOPHIL # BLD: 0 K/UL (ref 0–0.6)
EOSINOPHIL NFR BLD: 0 %
GFR SERPLBLD CREATININE-BSD FMLA CKD-EPI: >90 ML/MIN/{1.73_M2}
GLUCOSE SERPL-MCNC: 199 MG/DL (ref 70–99)
HCT VFR BLD AUTO: 23.8 % (ref 36–48)
HGB BLD-MCNC: 7.8 G/DL (ref 12–16)
LYMPHOCYTES # BLD: 0.7 K/UL (ref 1–5.1)
LYMPHOCYTES NFR BLD: 5.8 %
MCH RBC QN AUTO: 28.7 PG (ref 26–34)
MCHC RBC AUTO-ENTMCNC: 32.6 G/DL (ref 31–36)
MCV RBC AUTO: 87.9 FL (ref 80–100)
MONOCYTES # BLD: 0.5 K/UL (ref 0–1.3)
MONOCYTES NFR BLD: 4.4 %
NEUTROPHILS # BLD: 11.1 K/UL (ref 1.7–7.7)
NEUTROPHILS NFR BLD: 89.7 %
PLATELET # BLD AUTO: 420 K/UL (ref 135–450)
PMV BLD AUTO: 6.8 FL (ref 5–10.5)
POTASSIUM SERPL-SCNC: 4.2 MMOL/L (ref 3.5–5.1)
POTASSIUM SERPL-SCNC: 4.2 MMOL/L (ref 3.5–5.1)
RBC # BLD AUTO: 2.71 M/UL (ref 4–5.2)
SODIUM SERPL-SCNC: 138 MMOL/L (ref 136–145)
WBC # BLD AUTO: 12.4 K/UL (ref 4–11)

## 2024-05-09 PROCEDURE — 99024 POSTOP FOLLOW-UP VISIT: CPT | Performed by: SPECIALIST/TECHNOLOGIST

## 2024-05-09 PROCEDURE — 6360000002 HC RX W HCPCS: Performed by: ORTHOPAEDIC SURGERY

## 2024-05-09 PROCEDURE — 2580000003 HC RX 258: Performed by: ORTHOPAEDIC SURGERY

## 2024-05-09 PROCEDURE — 6370000000 HC RX 637 (ALT 250 FOR IP): Performed by: SPECIALIST/TECHNOLOGIST

## 2024-05-09 PROCEDURE — 2700000000 HC OXYGEN THERAPY PER DAY

## 2024-05-09 PROCEDURE — 97162 PT EVAL MOD COMPLEX 30 MIN: CPT

## 2024-05-09 PROCEDURE — 97166 OT EVAL MOD COMPLEX 45 MIN: CPT

## 2024-05-09 PROCEDURE — 1200000000 HC SEMI PRIVATE

## 2024-05-09 PROCEDURE — 80048 BASIC METABOLIC PNL TOTAL CA: CPT

## 2024-05-09 PROCEDURE — 85025 COMPLETE CBC W/AUTO DIFF WBC: CPT

## 2024-05-09 PROCEDURE — APPNB45 APP NON BILLABLE 31-45 MINUTES: Performed by: SPECIALIST/TECHNOLOGIST

## 2024-05-09 PROCEDURE — 97530 THERAPEUTIC ACTIVITIES: CPT

## 2024-05-09 PROCEDURE — 6370000000 HC RX 637 (ALT 250 FOR IP): Performed by: ORTHOPAEDIC SURGERY

## 2024-05-09 PROCEDURE — 94761 N-INVAS EAR/PLS OXIMETRY MLT: CPT

## 2024-05-09 RX ORDER — HYDROCODONE BITARTRATE AND ACETAMINOPHEN 5; 325 MG/1; MG/1
2 TABLET ORAL EVERY 4 HOURS PRN
Status: DISCONTINUED | OUTPATIENT
Start: 2024-05-09 | End: 2024-05-12

## 2024-05-09 RX ORDER — OXYCODONE HYDROCHLORIDE 5 MG/1
5 TABLET ORAL EVERY 6 HOURS PRN
Qty: 28 TABLET | Refills: 0 | Status: SHIPPED | OUTPATIENT
Start: 2024-05-09 | End: 2024-05-13 | Stop reason: HOSPADM

## 2024-05-09 RX ORDER — ENOXAPARIN SODIUM 100 MG/ML
40 INJECTION SUBCUTANEOUS DAILY
Qty: 16.8 ML | Refills: 0 | Status: ON HOLD | OUTPATIENT
Start: 2024-05-10 | End: 2024-06-21

## 2024-05-09 RX ORDER — HYDROCODONE BITARTRATE AND ACETAMINOPHEN 5; 325 MG/1; MG/1
1 TABLET ORAL EVERY 4 HOURS PRN
Status: DISCONTINUED | OUTPATIENT
Start: 2024-05-09 | End: 2024-05-12

## 2024-05-09 RX ORDER — OXYCODONE HYDROCHLORIDE 5 MG/1
10 TABLET ORAL EVERY 4 HOURS PRN
Status: DISCONTINUED | OUTPATIENT
Start: 2024-05-09 | End: 2024-05-11

## 2024-05-09 RX ORDER — OXYCODONE HYDROCHLORIDE 5 MG/1
5 TABLET ORAL EVERY 4 HOURS PRN
Status: DISCONTINUED | OUTPATIENT
Start: 2024-05-09 | End: 2024-05-11

## 2024-05-09 RX ORDER — KETOROLAC TROMETHAMINE 30 MG/ML
15 INJECTION, SOLUTION INTRAMUSCULAR; INTRAVENOUS EVERY 6 HOURS PRN
Status: DISCONTINUED | OUTPATIENT
Start: 2024-05-09 | End: 2024-05-13

## 2024-05-09 RX ADMIN — TRAZODONE HYDROCHLORIDE 500 MG: 50 TABLET ORAL at 19:52

## 2024-05-09 RX ADMIN — PANTOPRAZOLE SODIUM 40 MG: 40 TABLET, DELAYED RELEASE ORAL at 06:30

## 2024-05-09 RX ADMIN — SODIUM CHLORIDE, PRESERVATIVE FREE 10 ML: 5 INJECTION INTRAVENOUS at 19:52

## 2024-05-09 RX ADMIN — HYDROCODONE BITARTRATE AND ACETAMINOPHEN 1 TABLET: 10; 325 TABLET ORAL at 00:00

## 2024-05-09 RX ADMIN — PANTOPRAZOLE SODIUM 40 MG: 40 TABLET, DELAYED RELEASE ORAL at 17:16

## 2024-05-09 RX ADMIN — OXYCODONE 10 MG: 5 TABLET ORAL at 19:52

## 2024-05-09 RX ADMIN — MORPHINE SULFATE 4 MG: 4 INJECTION, SOLUTION INTRAMUSCULAR; INTRAVENOUS at 09:08

## 2024-05-09 RX ADMIN — Medication 2000 MG: at 00:02

## 2024-05-09 RX ADMIN — MORPHINE SULFATE 4 MG: 4 INJECTION, SOLUTION INTRAMUSCULAR; INTRAVENOUS at 21:42

## 2024-05-09 RX ADMIN — MIDODRINE HYDROCHLORIDE 5 MG: 5 TABLET ORAL at 09:08

## 2024-05-09 RX ADMIN — ASPIRIN 81 MG: 81 TABLET, COATED ORAL at 09:08

## 2024-05-09 RX ADMIN — MORPHINE SULFATE 2 MG: 2 INJECTION, SOLUTION INTRAMUSCULAR; INTRAVENOUS at 03:59

## 2024-05-09 RX ADMIN — HYDROCODONE BITARTRATE AND ACETAMINOPHEN 2 TABLET: 5; 325 TABLET ORAL at 17:14

## 2024-05-09 RX ADMIN — HYDROCODONE BITARTRATE AND ACETAMINOPHEN 2 TABLET: 5; 325 TABLET ORAL at 11:02

## 2024-05-09 RX ADMIN — ENOXAPARIN SODIUM 40 MG: 100 INJECTION SUBCUTANEOUS at 09:07

## 2024-05-09 RX ADMIN — Medication 2000 MG: at 09:21

## 2024-05-09 RX ADMIN — SODIUM CHLORIDE, PRESERVATIVE FREE 10 ML: 5 INJECTION INTRAVENOUS at 09:09

## 2024-05-09 RX ADMIN — HYDROCODONE BITARTRATE AND ACETAMINOPHEN 1 TABLET: 10; 325 TABLET ORAL at 06:35

## 2024-05-09 RX ADMIN — MIDODRINE HYDROCHLORIDE 5 MG: 5 TABLET ORAL at 12:59

## 2024-05-09 RX ADMIN — AMITRIPTYLINE HYDROCHLORIDE 25 MG: 25 TABLET, FILM COATED ORAL at 21:37

## 2024-05-09 ASSESSMENT — PAIN SCALES - GENERAL
PAINLEVEL_OUTOF10: 7
PAINLEVEL_OUTOF10: 6
PAINLEVEL_OUTOF10: 8
PAINLEVEL_OUTOF10: 8
PAINLEVEL_OUTOF10: 0
PAINLEVEL_OUTOF10: 9
PAINLEVEL_OUTOF10: 6
PAINLEVEL_OUTOF10: 7
PAINLEVEL_OUTOF10: 8

## 2024-05-09 ASSESSMENT — PAIN DESCRIPTION - ORIENTATION
ORIENTATION: LEFT

## 2024-05-09 ASSESSMENT — PAIN DESCRIPTION - LOCATION
LOCATION: LEG

## 2024-05-09 NOTE — CARE COORDINATION
[facility] - Acute Rehab Unit   After review, this patient is felt to be:       []  Appropriate for Acute Inpatient Rehab    [x]  Appropriate for Acute Inpatient Rehab Pending Insurance Authorization    []  Not appropriate for Acute Inpatient Rehab    []  Referral received and ARU reviewing patient; Evaluation ongoing.      Precert initiated 5/9/24 for ARU admission. Pt in agreement per  conversation with pt this PM. Ref#: [id number].  Will notify DCP with further updates. Thank you for the referral.     Jojo Knight RN, BSN.  ARU Clinical Liaison  [facility]  Phone: [phone]  Fax: [phone]

## 2024-05-09 NOTE — DISCHARGE INSTR - COC
Continuity of Care Form    Patient Name: Radha Malin   :  1960  MRN:  [id number]    Admit date:  2024  Discharge date:  2024    Code Status Order: Full Code   Advance Directives:     Admitting Physician:  Vasile Miramontes MD  PCP: Terrell Perez DO    Discharging Nurse: Yee Rivas RN  Discharging Hospital Unit/Room#: 0542/0542-01  Discharging Unit Phone Number: [phone]    Emergency Contact:   Extended Emergency Contact Information  Primary Emergency Contact: Matthew Mahmood  Address: [address] [place] [address] of [facility]  Home Phone: [phone]  Mobile Phone: [phone]  Relation: Domestic Partner  Secondary Emergency Contact: DashaTaina  Home Phone: [phone]  Mobile Phone: [phone]  Relation: Brother/Sister    Past Surgical History:  Past Surgical History:   Procedure Laterality Date    ANKLE FRACTURE SURGERY Right 2014    ANKLE SURGERY Right 2023    RIGHT FIBULA HARDWARE (PLATE) REMOVAL AND PRODENSE INSERTION performed by Clarence Salazar MD at [facility] OR    CARPAL TUNNEL RELEASE      right    DENTAL SURGERY      implants    FRACTURE SURGERY Left     foot X 2    KNEE ARTHROSCOPY      left    LUNG REMOVAL, PARTIAL Right     NOSE SURGERY Left     left nasal valve reconstruction-total of 3 surgeries    TIBIA FRACTURE SURGERY Left 2024    TIBIA INTRAMEDULLARY NAIL INSERTION performed by Vasile Antunez MD at [facility] OR    TUBAL LIGATION         Immunization History:   Immunization History   Administered Date(s) Administered    COVID-19, PFIZER PURPLE top, DILUTE for use, (age 12 y+), 30mcg/0.3mL 2021, 2021       Active Problems:  Patient Active Problem List   Diagnosis Code    Pilon fracture S82.873A    Arthritis of ankle joint M19.079    Community acquired bacterial pneumonia J15.9    Reactive airway disease J45.909    History of lung cancer Z85.118    Chronic pain of right lower extremity M79.604, G89.29    Tobacco use

## 2024-05-09 NOTE — DISCHARGE INSTRUCTIONS
Keep dressing clean and dry.  Change dressing daily after post op day 3, or as needed for excess drainage.  Please call physician if increased redness around incision, increased drainage, fevers, or pain becomes significantly worse.  Do not immerse in water such as baths but okay to shower with dressing on to protect wound.    F/U with Dr Vasile Antunez in 10-14 days.  Call [facility] for appointment time and date for follow up at [phone].        Weight Bearing on Surgical Side: toe touch weightbearing to the left leg for balance during transfers only. Anticipate 6 weeks toe-touch weightbearing restrictions prior to proceeding with full weightbearing as tolerated    Knee and ankle range of motion as tolerated  Ice and elevation to limit swelling.    Continue DVT Prophylaxis: Lovenox 40mg subcut daily for 30 days    Pain Medications  You were given oxycodone (Oxycontin, Oxyir). Take this for breakthrough surgical pain only. Do not take your pain management prescribed pain medicine for surgical pain. If you take your pain management prescribed medication more frequently than prescribed this could compromise your pain contract.   Wean off pain medications as you deem appropriate as long as pain is under control  Be sure to drink plenty of fluids (recommend water) while taking narcotic pain medications to prevent constipation   You may take an over the counter laxative or stool softener as needed to prevent/treat constipation as well, we recommend miralax/glycolax.  We recommend that you consider taking these medications the entire time you are taking pain medication.  Cold packs/Ice packs/Machine  May be used as much as necessary to control swelling/inflammation/soreness  Be sure to have a barrier (cloth, clothing, towel) between the site and the ice pack to prevent frostbite  Contact [facility] office [phone] if  Increased redness, swelling, drainage of any kind, and/or pain to

## 2024-05-10 LAB
ANION GAP SERPL CALCULATED.3IONS-SCNC: 10 MMOL/L (ref 3–16)
BASOPHILS # BLD: 0.1 K/UL (ref 0–0.2)
BASOPHILS NFR BLD: 0.6 %
BUN SERPL-MCNC: 8 MG/DL (ref 7–20)
CALCIUM SERPL-MCNC: 8.8 MG/DL (ref 8.3–10.6)
CHLORIDE SERPL-SCNC: 106 MMOL/L (ref 99–110)
CO2 SERPL-SCNC: 26 MMOL/L (ref 21–32)
CREAT SERPL-MCNC: <0.5 MG/DL (ref 0.6–1.2)
DEPRECATED RDW RBC AUTO: 13.3 % (ref 12.4–15.4)
EOSINOPHIL # BLD: 0.1 K/UL (ref 0–0.6)
EOSINOPHIL NFR BLD: 1.2 %
GFR SERPLBLD CREATININE-BSD FMLA CKD-EPI: >90 ML/MIN/{1.73_M2}
GLUCOSE SERPL-MCNC: 88 MG/DL (ref 70–99)
HCT VFR BLD AUTO: 21.6 % (ref 36–48)
HGB BLD-MCNC: 7.1 G/DL (ref 12–16)
LYMPHOCYTES # BLD: 2.2 K/UL (ref 1–5.1)
LYMPHOCYTES NFR BLD: 24.4 %
MAGNESIUM SERPL-MCNC: 2 MG/DL (ref 1.8–2.4)
MCH RBC QN AUTO: 28.5 PG (ref 26–34)
MCHC RBC AUTO-ENTMCNC: 32.8 G/DL (ref 31–36)
MCV RBC AUTO: 87 FL (ref 80–100)
MONOCYTES # BLD: 0.7 K/UL (ref 0–1.3)
MONOCYTES NFR BLD: 7.5 %
NEUTROPHILS # BLD: 6 K/UL (ref 1.7–7.7)
NEUTROPHILS NFR BLD: 66.3 %
PLATELET # BLD AUTO: 477 K/UL (ref 135–450)
PMV BLD AUTO: 7.1 FL (ref 5–10.5)
POTASSIUM SERPL-SCNC: 3.5 MMOL/L (ref 3.5–5.1)
RBC # BLD AUTO: 2.48 M/UL (ref 4–5.2)
SODIUM SERPL-SCNC: 142 MMOL/L (ref 136–145)
WBC # BLD AUTO: 9.1 K/UL (ref 4–11)

## 2024-05-10 PROCEDURE — 6370000000 HC RX 637 (ALT 250 FOR IP): Performed by: ORTHOPAEDIC SURGERY

## 2024-05-10 PROCEDURE — 85025 COMPLETE CBC W/AUTO DIFF WBC: CPT

## 2024-05-10 PROCEDURE — 1200000000 HC SEMI PRIVATE

## 2024-05-10 PROCEDURE — 36415 COLL VENOUS BLD VENIPUNCTURE: CPT

## 2024-05-10 PROCEDURE — 6370000000 HC RX 637 (ALT 250 FOR IP): Performed by: SPECIALIST/TECHNOLOGIST

## 2024-05-10 PROCEDURE — 97535 SELF CARE MNGMENT TRAINING: CPT

## 2024-05-10 PROCEDURE — 80048 BASIC METABOLIC PNL TOTAL CA: CPT

## 2024-05-10 PROCEDURE — 83735 ASSAY OF MAGNESIUM: CPT

## 2024-05-10 PROCEDURE — 2580000003 HC RX 258: Performed by: ORTHOPAEDIC SURGERY

## 2024-05-10 PROCEDURE — 99024 POSTOP FOLLOW-UP VISIT: CPT | Performed by: PHYSICIAN ASSISTANT

## 2024-05-10 PROCEDURE — 2700000000 HC OXYGEN THERAPY PER DAY

## 2024-05-10 PROCEDURE — 97110 THERAPEUTIC EXERCISES: CPT

## 2024-05-10 PROCEDURE — 6360000002 HC RX W HCPCS: Performed by: ORTHOPAEDIC SURGERY

## 2024-05-10 PROCEDURE — 94761 N-INVAS EAR/PLS OXIMETRY MLT: CPT

## 2024-05-10 PROCEDURE — 97530 THERAPEUTIC ACTIVITIES: CPT

## 2024-05-10 RX ADMIN — OXYCODONE 10 MG: 5 TABLET ORAL at 04:00

## 2024-05-10 RX ADMIN — MIDODRINE HYDROCHLORIDE 5 MG: 5 TABLET ORAL at 16:52

## 2024-05-10 RX ADMIN — MORPHINE SULFATE 4 MG: 4 INJECTION, SOLUTION INTRAMUSCULAR; INTRAVENOUS at 21:32

## 2024-05-10 RX ADMIN — HYDROCODONE BITARTRATE AND ACETAMINOPHEN 2 TABLET: 5; 325 TABLET ORAL at 12:01

## 2024-05-10 RX ADMIN — PANTOPRAZOLE SODIUM 40 MG: 40 TABLET, DELAYED RELEASE ORAL at 05:58

## 2024-05-10 RX ADMIN — POLYETHYLENE GLYCOL 3350 17 G: 17 POWDER, FOR SOLUTION ORAL at 16:57

## 2024-05-10 RX ADMIN — AMITRIPTYLINE HYDROCHLORIDE 25 MG: 25 TABLET, FILM COATED ORAL at 21:32

## 2024-05-10 RX ADMIN — ENOXAPARIN SODIUM 40 MG: 100 INJECTION SUBCUTANEOUS at 08:35

## 2024-05-10 RX ADMIN — HYDROCODONE BITARTRATE AND ACETAMINOPHEN 2 TABLET: 5; 325 TABLET ORAL at 19:13

## 2024-05-10 RX ADMIN — MIDODRINE HYDROCHLORIDE 5 MG: 5 TABLET ORAL at 12:00

## 2024-05-10 RX ADMIN — TRAZODONE HYDROCHLORIDE 500 MG: 50 TABLET ORAL at 21:32

## 2024-05-10 RX ADMIN — SODIUM CHLORIDE, PRESERVATIVE FREE 10 ML: 5 INJECTION INTRAVENOUS at 08:34

## 2024-05-10 RX ADMIN — ASPIRIN 81 MG: 81 TABLET, COATED ORAL at 08:33

## 2024-05-10 RX ADMIN — MIDODRINE HYDROCHLORIDE 5 MG: 5 TABLET ORAL at 08:33

## 2024-05-10 RX ADMIN — PANTOPRAZOLE SODIUM 40 MG: 40 TABLET, DELAYED RELEASE ORAL at 16:52

## 2024-05-10 RX ADMIN — HYDROCODONE BITARTRATE AND ACETAMINOPHEN 2 TABLET: 5; 325 TABLET ORAL at 00:18

## 2024-05-10 RX ADMIN — SODIUM CHLORIDE, PRESERVATIVE FREE 10 ML: 5 INJECTION INTRAVENOUS at 21:33

## 2024-05-10 RX ADMIN — HYDROCODONE BITARTRATE AND ACETAMINOPHEN 2 TABLET: 5; 325 TABLET ORAL at 06:05

## 2024-05-10 RX ADMIN — OXYCODONE 10 MG: 5 TABLET ORAL at 09:37

## 2024-05-10 RX ADMIN — TIZANIDINE 4 MG: 4 TABLET ORAL at 21:32

## 2024-05-10 ASSESSMENT — PAIN DESCRIPTION - LOCATION
LOCATION: LEG

## 2024-05-10 ASSESSMENT — PAIN SCALES - GENERAL
PAINLEVEL_OUTOF10: 8
PAINLEVEL_OUTOF10: 7
PAINLEVEL_OUTOF10: 5
PAINLEVEL_OUTOF10: 5
PAINLEVEL_OUTOF10: 7

## 2024-05-10 ASSESSMENT — PAIN DESCRIPTION - ORIENTATION
ORIENTATION: LEFT

## 2024-05-10 NOTE — CARE COORDINATION
Writer reviewed chart, IPR started a cert on 5/9, Patient is NWB. Pain control today.     Justine Reyes RN

## 2024-05-11 LAB
BASOPHILS # BLD: 0.1 K/UL (ref 0–0.2)
BASOPHILS NFR BLD: 0.9 %
DEPRECATED RDW RBC AUTO: 13.2 % (ref 12.4–15.4)
EOSINOPHIL # BLD: 0.4 K/UL (ref 0–0.6)
EOSINOPHIL NFR BLD: 4.5 %
HCT VFR BLD AUTO: 23 % (ref 36–48)
HGB BLD-MCNC: 7.7 G/DL (ref 12–16)
LYMPHOCYTES # BLD: 2.6 K/UL (ref 1–5.1)
LYMPHOCYTES NFR BLD: 26.9 %
MCH RBC QN AUTO: 29 PG (ref 26–34)
MCHC RBC AUTO-ENTMCNC: 33.3 G/DL (ref 31–36)
MCV RBC AUTO: 87.1 FL (ref 80–100)
MONOCYTES # BLD: 0.9 K/UL (ref 0–1.3)
MONOCYTES NFR BLD: 9.3 %
NEUTROPHILS # BLD: 5.5 K/UL (ref 1.7–7.7)
NEUTROPHILS NFR BLD: 58.4 %
PLATELET # BLD AUTO: 524 K/UL (ref 135–450)
PMV BLD AUTO: 7 FL (ref 5–10.5)
RBC # BLD AUTO: 2.64 M/UL (ref 4–5.2)
WBC # BLD AUTO: 9.5 K/UL (ref 4–11)

## 2024-05-11 PROCEDURE — 6370000000 HC RX 637 (ALT 250 FOR IP): Performed by: ORTHOPAEDIC SURGERY

## 2024-05-11 PROCEDURE — 85025 COMPLETE CBC W/AUTO DIFF WBC: CPT

## 2024-05-11 PROCEDURE — 6360000002 HC RX W HCPCS: Performed by: ORTHOPAEDIC SURGERY

## 2024-05-11 PROCEDURE — 97530 THERAPEUTIC ACTIVITIES: CPT

## 2024-05-11 PROCEDURE — 6370000000 HC RX 637 (ALT 250 FOR IP): Performed by: SPECIALIST/TECHNOLOGIST

## 2024-05-11 PROCEDURE — 97110 THERAPEUTIC EXERCISES: CPT

## 2024-05-11 PROCEDURE — 2580000003 HC RX 258: Performed by: ORTHOPAEDIC SURGERY

## 2024-05-11 PROCEDURE — 6370000000 HC RX 637 (ALT 250 FOR IP): Performed by: INTERNAL MEDICINE

## 2024-05-11 PROCEDURE — 6360000002 HC RX W HCPCS: Performed by: SPECIALIST/TECHNOLOGIST

## 2024-05-11 PROCEDURE — 1200000000 HC SEMI PRIVATE

## 2024-05-11 RX ORDER — SENNA AND DOCUSATE SODIUM 50; 8.6 MG/1; MG/1
2 TABLET, FILM COATED ORAL 2 TIMES DAILY PRN
Status: DISCONTINUED | OUTPATIENT
Start: 2024-05-11 | End: 2024-05-11

## 2024-05-11 RX ORDER — SENNA AND DOCUSATE SODIUM 50; 8.6 MG/1; MG/1
2 TABLET, FILM COATED ORAL 2 TIMES DAILY
Status: DISCONTINUED | OUTPATIENT
Start: 2024-05-11 | End: 2024-05-12

## 2024-05-11 RX ORDER — BISACODYL 10 MG
10 SUPPOSITORY, RECTAL RECTAL DAILY PRN
Status: DISCONTINUED | OUTPATIENT
Start: 2024-05-11 | End: 2024-05-14 | Stop reason: HOSPADM

## 2024-05-11 RX ADMIN — TRAZODONE HYDROCHLORIDE 500 MG: 50 TABLET ORAL at 20:33

## 2024-05-11 RX ADMIN — HYDROCODONE BITARTRATE AND ACETAMINOPHEN 2 TABLET: 5; 325 TABLET ORAL at 00:54

## 2024-05-11 RX ADMIN — TIZANIDINE 4 MG: 4 TABLET ORAL at 20:33

## 2024-05-11 RX ADMIN — HYDROCODONE BITARTRATE AND ACETAMINOPHEN 2 TABLET: 5; 325 TABLET ORAL at 07:37

## 2024-05-11 RX ADMIN — ASPIRIN 81 MG: 81 TABLET, COATED ORAL at 07:38

## 2024-05-11 RX ADMIN — SODIUM CHLORIDE, PRESERVATIVE FREE 10 ML: 5 INJECTION INTRAVENOUS at 20:36

## 2024-05-11 RX ADMIN — MIDODRINE HYDROCHLORIDE 5 MG: 5 TABLET ORAL at 17:43

## 2024-05-11 RX ADMIN — SODIUM CHLORIDE, PRESERVATIVE FREE 10 ML: 5 INJECTION INTRAVENOUS at 07:39

## 2024-05-11 RX ADMIN — PANTOPRAZOLE SODIUM 40 MG: 40 TABLET, DELAYED RELEASE ORAL at 05:14

## 2024-05-11 RX ADMIN — HYDROCODONE BITARTRATE AND ACETAMINOPHEN 2 TABLET: 5; 325 TABLET ORAL at 20:32

## 2024-05-11 RX ADMIN — PANTOPRAZOLE SODIUM 40 MG: 40 TABLET, DELAYED RELEASE ORAL at 15:29

## 2024-05-11 RX ADMIN — HYDROCODONE BITARTRATE AND ACETAMINOPHEN 2 TABLET: 5; 325 TABLET ORAL at 15:28

## 2024-05-11 RX ADMIN — MIDODRINE HYDROCHLORIDE 5 MG: 5 TABLET ORAL at 11:43

## 2024-05-11 RX ADMIN — AMITRIPTYLINE HYDROCHLORIDE 25 MG: 25 TABLET, FILM COATED ORAL at 20:33

## 2024-05-11 RX ADMIN — KETOROLAC TROMETHAMINE 15 MG: 30 INJECTION, SOLUTION INTRAMUSCULAR; INTRAVENOUS at 17:55

## 2024-05-11 RX ADMIN — POLYETHYLENE GLYCOL 3350 17 G: 17 POWDER, FOR SOLUTION ORAL at 11:43

## 2024-05-11 RX ADMIN — OXYCODONE 10 MG: 5 TABLET ORAL at 11:43

## 2024-05-11 RX ADMIN — MIDODRINE HYDROCHLORIDE 5 MG: 5 TABLET ORAL at 07:38

## 2024-05-11 RX ADMIN — SENNOSIDES AND DOCUSATE SODIUM 2 TABLET: 50; 8.6 TABLET ORAL at 20:33

## 2024-05-11 RX ADMIN — TIZANIDINE 4 MG: 4 TABLET ORAL at 03:09

## 2024-05-11 RX ADMIN — ENOXAPARIN SODIUM 40 MG: 100 INJECTION SUBCUTANEOUS at 07:38

## 2024-05-11 ASSESSMENT — PAIN - FUNCTIONAL ASSESSMENT
PAIN_FUNCTIONAL_ASSESSMENT: PREVENTS OR INTERFERES SOME ACTIVE ACTIVITIES AND ADLS
PAIN_FUNCTIONAL_ASSESSMENT: PREVENTS OR INTERFERES WITH MANY ACTIVE NOT PASSIVE ACTIVITIES

## 2024-05-11 ASSESSMENT — PAIN DESCRIPTION - LOCATION
LOCATION: LEG
LOCATION: LEG
LOCATION: BREAST;LEG
LOCATION: BREAST;LEG
LOCATION: LEG

## 2024-05-11 ASSESSMENT — PAIN SCALES - GENERAL
PAINLEVEL_OUTOF10: 7
PAINLEVEL_OUTOF10: 0
PAINLEVEL_OUTOF10: 7
PAINLEVEL_OUTOF10: 3
PAINLEVEL_OUTOF10: 7

## 2024-05-11 ASSESSMENT — PAIN DESCRIPTION - ORIENTATION
ORIENTATION: LEFT
ORIENTATION: RIGHT;LEFT
ORIENTATION: LEFT
ORIENTATION: LEFT

## 2024-05-11 ASSESSMENT — PAIN DESCRIPTION - DESCRIPTORS
DESCRIPTORS: ACHING

## 2024-05-11 NOTE — PLAN OF CARE
Pt scoring pain on 0-10 scale. Pain medications given per MAR. Pt instructed to call out when pain level increasing. Call light within reach.     Problem: Pain  Goal: Verbalizes/displays adequate comfort level or baseline comfort level  5/11/2024 0918 by Kamryn Lang, RN  Outcome: Progressing  5/10/2024 2215 by Hillayr García, RN  Outcome: Progressing

## 2024-05-12 PROCEDURE — 6370000000 HC RX 637 (ALT 250 FOR IP): Performed by: SPECIALIST/TECHNOLOGIST

## 2024-05-12 PROCEDURE — 94640 AIRWAY INHALATION TREATMENT: CPT

## 2024-05-12 PROCEDURE — 6370000000 HC RX 637 (ALT 250 FOR IP): Performed by: ORTHOPAEDIC SURGERY

## 2024-05-12 PROCEDURE — 6360000002 HC RX W HCPCS: Performed by: ORTHOPAEDIC SURGERY

## 2024-05-12 PROCEDURE — 2580000003 HC RX 258: Performed by: ORTHOPAEDIC SURGERY

## 2024-05-12 PROCEDURE — 6370000000 HC RX 637 (ALT 250 FOR IP): Performed by: INTERNAL MEDICINE

## 2024-05-12 PROCEDURE — 1200000000 HC SEMI PRIVATE

## 2024-05-12 RX ORDER — MORPHINE SULFATE 15 MG/1
15 TABLET, FILM COATED, EXTENDED RELEASE ORAL EVERY 12 HOURS SCHEDULED
Status: DISCONTINUED | OUTPATIENT
Start: 2024-05-12 | End: 2024-05-14 | Stop reason: HOSPADM

## 2024-05-12 RX ORDER — HYDROCODONE BITARTRATE AND ACETAMINOPHEN 5; 325 MG/1; MG/1
2 TABLET ORAL EVERY 4 HOURS PRN
Status: DISCONTINUED | OUTPATIENT
Start: 2024-05-12 | End: 2024-05-14

## 2024-05-12 RX ORDER — MECOBALAMIN 5000 MCG
5 TABLET,DISINTEGRATING ORAL NIGHTLY
Status: DISCONTINUED | OUTPATIENT
Start: 2024-05-12 | End: 2024-05-14 | Stop reason: HOSPADM

## 2024-05-12 RX ORDER — NALOXONE HYDROCHLORIDE 0.4 MG/ML
0.4 INJECTION, SOLUTION INTRAMUSCULAR; INTRAVENOUS; SUBCUTANEOUS PRN
Status: DISCONTINUED | OUTPATIENT
Start: 2024-05-12 | End: 2024-05-14 | Stop reason: HOSPADM

## 2024-05-12 RX ORDER — SENNA AND DOCUSATE SODIUM 50; 8.6 MG/1; MG/1
2 TABLET, FILM COATED ORAL 2 TIMES DAILY PRN
Status: DISCONTINUED | OUTPATIENT
Start: 2024-05-12 | End: 2024-05-14 | Stop reason: HOSPADM

## 2024-05-12 RX ADMIN — HYDROCODONE BITARTRATE AND ACETAMINOPHEN 2 TABLET: 5; 325 TABLET ORAL at 08:11

## 2024-05-12 RX ADMIN — Medication 2 PUFF: at 21:24

## 2024-05-12 RX ADMIN — TIZANIDINE 4 MG: 4 TABLET ORAL at 21:16

## 2024-05-12 RX ADMIN — HYDROCODONE BITARTRATE AND ACETAMINOPHEN 2 TABLET: 5; 325 TABLET ORAL at 01:45

## 2024-05-12 RX ADMIN — SODIUM CHLORIDE, PRESERVATIVE FREE 10 ML: 5 INJECTION INTRAVENOUS at 21:46

## 2024-05-12 RX ADMIN — MIDODRINE HYDROCHLORIDE 5 MG: 5 TABLET ORAL at 08:12

## 2024-05-12 RX ADMIN — ENOXAPARIN SODIUM 40 MG: 100 INJECTION SUBCUTANEOUS at 08:11

## 2024-05-12 RX ADMIN — HYDROCODONE BITARTRATE AND ACETAMINOPHEN 2 TABLET: 5; 325 TABLET ORAL at 14:37

## 2024-05-12 RX ADMIN — TRAZODONE HYDROCHLORIDE 500 MG: 50 TABLET ORAL at 21:16

## 2024-05-12 RX ADMIN — SODIUM CHLORIDE, PRESERVATIVE FREE 10 ML: 5 INJECTION INTRAVENOUS at 08:13

## 2024-05-12 RX ADMIN — SENNOSIDES AND DOCUSATE SODIUM 2 TABLET: 50; 8.6 TABLET ORAL at 08:12

## 2024-05-12 RX ADMIN — PANTOPRAZOLE SODIUM 40 MG: 40 TABLET, DELAYED RELEASE ORAL at 08:12

## 2024-05-12 RX ADMIN — MORPHINE SULFATE 15 MG: 15 TABLET, FILM COATED, EXTENDED RELEASE ORAL at 21:16

## 2024-05-12 RX ADMIN — ASPIRIN 81 MG: 81 TABLET, COATED ORAL at 08:12

## 2024-05-12 RX ADMIN — Medication 5 MG: at 21:16

## 2024-05-12 RX ADMIN — MIDODRINE HYDROCHLORIDE 5 MG: 5 TABLET ORAL at 11:49

## 2024-05-12 RX ADMIN — PANTOPRAZOLE SODIUM 40 MG: 40 TABLET, DELAYED RELEASE ORAL at 16:46

## 2024-05-12 RX ADMIN — AMITRIPTYLINE HYDROCHLORIDE 25 MG: 25 TABLET, FILM COATED ORAL at 21:17

## 2024-05-12 RX ADMIN — MORPHINE SULFATE 15 MG: 15 TABLET, FILM COATED, EXTENDED RELEASE ORAL at 11:49

## 2024-05-12 RX ADMIN — MIDODRINE HYDROCHLORIDE 5 MG: 5 TABLET ORAL at 16:46

## 2024-05-12 RX ADMIN — TIZANIDINE 4 MG: 4 TABLET ORAL at 03:29

## 2024-05-12 ASSESSMENT — PAIN SCALES - GENERAL
PAINLEVEL_OUTOF10: 8
PAINLEVEL_OUTOF10: 7
PAINLEVEL_OUTOF10: 7
PAINLEVEL_OUTOF10: 6
PAINLEVEL_OUTOF10: 7

## 2024-05-12 ASSESSMENT — PAIN DESCRIPTION - DESCRIPTORS
DESCRIPTORS: ACHING
DESCRIPTORS: ACHING;SHARP
DESCRIPTORS: ACHING
DESCRIPTORS: ACHING

## 2024-05-12 ASSESSMENT — PAIN DESCRIPTION - ORIENTATION
ORIENTATION: RIGHT;LEFT
ORIENTATION: LEFT

## 2024-05-12 ASSESSMENT — PAIN DESCRIPTION - LOCATION
LOCATION: LEG
LOCATION: BREAST;LEG
LOCATION: BREAST;LEG
LOCATION: LEG

## 2024-05-12 ASSESSMENT — PAIN - FUNCTIONAL ASSESSMENT: PAIN_FUNCTIONAL_ASSESSMENT: PREVENTS OR INTERFERES SOME ACTIVE ACTIVITIES AND ADLS

## 2024-05-12 NOTE — PLAN OF CARE
Problem: Discharge Planning  Goal: Discharge to home or other facility with appropriate resources  Outcome: Progressing     Problem: Pain  Goal: Verbalizes/displays adequate comfort level or baseline comfort level  5/11/2024 2223 by Eliel Varela RN  Outcome: Progressing  5/11/2024 0918 by Kamryn Lang, RN  Outcome: Progressing     Problem: Safety - Adult  Goal: Free from fall injury  Outcome: Progressing     Problem: ABCDS Injury Assessment  Goal: Absence of physical injury  Outcome: Progressing     Problem: Skin/Tissue Integrity  Goal: Absence of new skin breakdown  Description: 1.  Monitor for areas of redness and/or skin breakdown  2.  Assess vascular access sites hourly  3.  Every 4-6 hours minimum:  Change oxygen saturation probe site  4.  Every 4-6 hours:  If on nasal continuous positive airway pressure, respiratory therapy assess nares and determine need for appliance change or resting period.  Outcome: Progressing

## 2024-05-12 NOTE — CARE COORDINATION
[facility] - Acute Rehab Unit   [employer] called with intent to deny. P2P offered. Call [phone] by 5/13 at 12pm to complete. MD will need 3 patient identifiers. ID #: [id number]. CM notified.     Thank you.   Olga Phillips M.A, [facility]SLP  Clinical Liaison

## 2024-05-12 NOTE — PLAN OF CARE
Bed in lowest position, wheels locked, 2/4 side rails up, nonskid footwear on. Bed/ chair check alarm in place, call light within reach. Pt instructed to call out when needing assistance. Pt stated understanding.Nurse will continue to monitor.     Problem: Safety - Adult  Goal: Free from fall injury  5/12/2024 0947 by Kamryn Lang, RN  Outcome: Progressing  5/11/2024 2223 by Eliel Varela, RN  Outcome: Progressing

## 2024-05-13 PROBLEM — G89.29 CHRONIC PAIN: Status: ACTIVE | Noted: 2024-04-16

## 2024-05-13 PROBLEM — I95.1 ORTHOSTATIC HYPOTENSION: Status: ACTIVE | Noted: 2020-09-02

## 2024-05-13 PROBLEM — S82.242A CLOSED DISPLACED SPIRAL FRACTURE OF SHAFT OF LEFT TIBIA: Status: ACTIVE | Noted: 2024-05-13

## 2024-05-13 PROCEDURE — 6370000000 HC RX 637 (ALT 250 FOR IP): Performed by: INTERNAL MEDICINE

## 2024-05-13 PROCEDURE — 94640 AIRWAY INHALATION TREATMENT: CPT

## 2024-05-13 PROCEDURE — 97110 THERAPEUTIC EXERCISES: CPT

## 2024-05-13 PROCEDURE — 97530 THERAPEUTIC ACTIVITIES: CPT

## 2024-05-13 PROCEDURE — 1200000000 HC SEMI PRIVATE

## 2024-05-13 PROCEDURE — 6370000000 HC RX 637 (ALT 250 FOR IP): Performed by: NURSE PRACTITIONER

## 2024-05-13 PROCEDURE — 6370000000 HC RX 637 (ALT 250 FOR IP): Performed by: ORTHOPAEDIC SURGERY

## 2024-05-13 PROCEDURE — 2580000003 HC RX 258: Performed by: ORTHOPAEDIC SURGERY

## 2024-05-13 PROCEDURE — 97535 SELF CARE MNGMENT TRAINING: CPT

## 2024-05-13 PROCEDURE — 6360000002 HC RX W HCPCS: Performed by: ORTHOPAEDIC SURGERY

## 2024-05-13 PROCEDURE — 97116 GAIT TRAINING THERAPY: CPT

## 2024-05-13 PROCEDURE — 6360000002 HC RX W HCPCS: Performed by: SPECIALIST/TECHNOLOGIST

## 2024-05-13 RX ORDER — MIDODRINE HYDROCHLORIDE 5 MG/1
5 TABLET ORAL
Qty: 90 TABLET | Refills: 3 | Status: ON HOLD | DISCHARGE
Start: 2024-05-13

## 2024-05-13 RX ORDER — KETOROLAC TROMETHAMINE 30 MG/ML
15 INJECTION, SOLUTION INTRAMUSCULAR; INTRAVENOUS EVERY 6 HOURS PRN
Status: ACTIVE | OUTPATIENT
Start: 2024-05-13 | End: 2024-05-14

## 2024-05-13 RX ORDER — DIPHENHYDRAMINE HCL 25 MG
25 TABLET ORAL EVERY 6 HOURS PRN
Status: ON HOLD | DISCHARGE
Start: 2024-05-13 | End: 2024-06-12

## 2024-05-13 RX ORDER — POLYETHYLENE GLYCOL 3350 17 G/17G
17 POWDER, FOR SOLUTION ORAL DAILY
Status: DISCONTINUED | OUTPATIENT
Start: 2024-05-14 | End: 2024-05-14 | Stop reason: HOSPADM

## 2024-05-13 RX ORDER — DIPHENHYDRAMINE HCL 25 MG
25 TABLET ORAL EVERY 6 HOURS PRN
Status: DISCONTINUED | OUTPATIENT
Start: 2024-05-13 | End: 2024-05-14 | Stop reason: HOSPADM

## 2024-05-13 RX ORDER — HYDROCODONE BITARTRATE AND ACETAMINOPHEN 10; 325 MG/1; MG/1
1 TABLET ORAL
Qty: 25 TABLET | Refills: 0 | Status: ON HOLD | OUTPATIENT
Start: 2024-05-13 | End: 2024-05-18

## 2024-05-13 RX ORDER — MECOBALAMIN 5000 MCG
5 TABLET,DISINTEGRATING ORAL NIGHTLY
Status: ON HOLD | DISCHARGE
Start: 2024-05-13

## 2024-05-13 RX ORDER — TRAZODONE HYDROCHLORIDE 50 MG/1
300 TABLET ORAL NIGHTLY
Status: DISCONTINUED | OUTPATIENT
Start: 2024-05-13 | End: 2024-05-13

## 2024-05-13 RX ORDER — MORPHINE SULFATE 15 MG/1
15 TABLET, FILM COATED, EXTENDED RELEASE ORAL EVERY 12 HOURS SCHEDULED
Qty: 10 TABLET | Refills: 0 | Status: ON HOLD | OUTPATIENT
Start: 2024-05-13 | End: 2024-05-18

## 2024-05-13 RX ORDER — BISACODYL 10 MG
10 SUPPOSITORY, RECTAL RECTAL DAILY PRN
Status: ON HOLD | DISCHARGE
Start: 2024-05-13 | End: 2024-06-12

## 2024-05-13 RX ORDER — NALOXONE HYDROCHLORIDE 0.4 MG/ML
0.4 INJECTION, SOLUTION INTRAMUSCULAR; INTRAVENOUS; SUBCUTANEOUS PRN
Status: ON HOLD | DISCHARGE
Start: 2024-05-13

## 2024-05-13 RX ORDER — SENNA AND DOCUSATE SODIUM 50; 8.6 MG/1; MG/1
2 TABLET, FILM COATED ORAL 2 TIMES DAILY PRN
Status: ON HOLD | DISCHARGE
Start: 2024-05-13

## 2024-05-13 RX ORDER — POLYETHYLENE GLYCOL 3350 17 G/17G
17 POWDER, FOR SOLUTION ORAL DAILY PRN
Qty: 527 G | Refills: 1 | Status: ON HOLD | DISCHARGE
Start: 2024-05-13 | End: 2024-07-14

## 2024-05-13 RX ADMIN — Medication 2 PUFF: at 16:09

## 2024-05-13 RX ADMIN — MORPHINE SULFATE 15 MG: 15 TABLET, FILM COATED, EXTENDED RELEASE ORAL at 21:21

## 2024-05-13 RX ADMIN — SODIUM CHLORIDE, PRESERVATIVE FREE 10 ML: 5 INJECTION INTRAVENOUS at 21:22

## 2024-05-13 RX ADMIN — MIDODRINE HYDROCHLORIDE 5 MG: 5 TABLET ORAL at 09:35

## 2024-05-13 RX ADMIN — PANTOPRAZOLE SODIUM 40 MG: 40 TABLET, DELAYED RELEASE ORAL at 16:04

## 2024-05-13 RX ADMIN — HYDROCODONE BITARTRATE AND ACETAMINOPHEN 2 TABLET: 5; 325 TABLET ORAL at 13:32

## 2024-05-13 RX ADMIN — KETOROLAC TROMETHAMINE 15 MG: 30 INJECTION, SOLUTION INTRAMUSCULAR at 14:36

## 2024-05-13 RX ADMIN — TRAZODONE HYDROCHLORIDE 500 MG: 50 TABLET ORAL at 21:17

## 2024-05-13 RX ADMIN — SODIUM CHLORIDE, PRESERVATIVE FREE 10 ML: 5 INJECTION INTRAVENOUS at 09:36

## 2024-05-13 RX ADMIN — ENOXAPARIN SODIUM 40 MG: 100 INJECTION SUBCUTANEOUS at 09:35

## 2024-05-13 RX ADMIN — TIZANIDINE 4 MG: 4 TABLET ORAL at 14:25

## 2024-05-13 RX ADMIN — AMITRIPTYLINE HYDROCHLORIDE 25 MG: 25 TABLET, FILM COATED ORAL at 21:17

## 2024-05-13 RX ADMIN — Medication 5 MG: at 21:17

## 2024-05-13 RX ADMIN — MIDODRINE HYDROCHLORIDE 5 MG: 5 TABLET ORAL at 18:03

## 2024-05-13 RX ADMIN — MIDODRINE HYDROCHLORIDE 5 MG: 5 TABLET ORAL at 12:10

## 2024-05-13 RX ADMIN — HYDROCODONE BITARTRATE AND ACETAMINOPHEN 2 TABLET: 5; 325 TABLET ORAL at 06:52

## 2024-05-13 RX ADMIN — DIPHENHYDRAMINE HYDROCHLORIDE 25 MG: 25 TABLET ORAL at 01:29

## 2024-05-13 RX ADMIN — MORPHINE SULFATE 15 MG: 15 TABLET, FILM COATED, EXTENDED RELEASE ORAL at 09:35

## 2024-05-13 RX ADMIN — HYDROCODONE BITARTRATE AND ACETAMINOPHEN 2 TABLET: 5; 325 TABLET ORAL at 01:31

## 2024-05-13 RX ADMIN — HYDROCODONE BITARTRATE AND ACETAMINOPHEN 2 TABLET: 5; 325 TABLET ORAL at 18:06

## 2024-05-13 RX ADMIN — ASPIRIN 81 MG: 81 TABLET, COATED ORAL at 09:35

## 2024-05-13 RX ADMIN — PANTOPRAZOLE SODIUM 40 MG: 40 TABLET, DELAYED RELEASE ORAL at 09:35

## 2024-05-13 ASSESSMENT — PAIN SCALES - GENERAL
PAINLEVEL_OUTOF10: 6
PAINLEVEL_OUTOF10: 5
PAINLEVEL_OUTOF10: 7
PAINLEVEL_OUTOF10: 8
PAINLEVEL_OUTOF10: 8
PAINLEVEL_OUTOF10: 6
PAINLEVEL_OUTOF10: 8

## 2024-05-13 ASSESSMENT — PAIN DESCRIPTION - LOCATION
LOCATION: ANKLE
LOCATION: LEG
LOCATION: FOOT
LOCATION: ANKLE

## 2024-05-13 ASSESSMENT — PAIN DESCRIPTION - DESCRIPTORS
DESCRIPTORS: ACHING;DISCOMFORT
DESCRIPTORS: ACHING;STABBING
DESCRIPTORS: ACHING;DISCOMFORT

## 2024-05-13 ASSESSMENT — PAIN DESCRIPTION - ONSET
ONSET: ON-GOING

## 2024-05-13 ASSESSMENT — PAIN DESCRIPTION - ORIENTATION
ORIENTATION: LEFT

## 2024-05-13 ASSESSMENT — PAIN - FUNCTIONAL ASSESSMENT
PAIN_FUNCTIONAL_ASSESSMENT: PREVENTS OR INTERFERES WITH ALL ACTIVE AND SOME PASSIVE ACTIVITIES

## 2024-05-13 ASSESSMENT — PAIN DESCRIPTION - FREQUENCY
FREQUENCY: CONTINUOUS

## 2024-05-13 ASSESSMENT — PAIN DESCRIPTION - PAIN TYPE
TYPE: ACUTE PAIN;SURGICAL PAIN

## 2024-05-13 NOTE — CARE COORDINATION
[facility] - Acute Rehab Unit   P2P completed by Dr. Alan and was unsuccessful. CM requesting to initiate an appeal. Will start appeal when denial letter received.     1458: Fast appeal initiated.     Thank you.   Olga Phillips M.A, CCC-SLP  Clinical Liaison

## 2024-05-13 NOTE — PLAN OF CARE
Problem: Discharge Planning  Goal: Discharge to home or other facility with appropriate resources  Outcome: Progressing     Problem: Pain  Goal: Verbalizes/displays adequate comfort level or baseline comfort level  Outcome: Progressing     Problem: Safety - Adult  Goal: Free from fall injury  5/12/2024 2236 by Eliel Varela, RN  Outcome: Progressing  5/12/2024 0947 by Kamryn Lang RN  Outcome: Progressing     Problem: ABCDS Injury Assessment  Goal: Absence of physical injury  Outcome: Progressing     Problem: Skin/Tissue Integrity  Goal: Absence of new skin breakdown  Description: 1.  Monitor for areas of redness and/or skin breakdown  2.  Assess vascular access sites hourly  3.  Every 4-6 hours minimum:  Change oxygen saturation probe site  4.  Every 4-6 hours:  If on nasal continuous positive airway pressure, respiratory therapy assess nares and determine need for appliance change or resting period.  Outcome: Progressing

## 2024-05-13 NOTE — CARE COORDINATION
Writer reviewed chart day 6, Writer GENO COYNE about P2P, MD is not doing P2P, will let the P2P  and then ARU will appeal.     Justine Reyes RN

## 2024-05-13 NOTE — CONSULTS
Consult PerfectServed/called to Orthopedics on 05/07/24 at 8:35 AM Razia Soto  
Consult Placed  Called [facility]   Electronically signed by Marcelo Henry on 5/13/2024 at 8:07 AM    
Radha ALVAREZ Malin  5/9/2024  [id number]    Chief Complaint: Fall at home, initial encounter    Subjective:   This is a 62yo F who has a past medical history including:  Past Medical History:   Diagnosis Date    ADHD (attention deficit hyperactivity disorder)     Arthritis     Asthma     Cancer (HCC)     lung    History of blood transfusion     Postural hypotension     Recurrent sinus infections     Wears glasses     Wears partial dentures     implants on bottom to hold partial / partial on top also     Who came in post fall and was seen to have left tib/fib fracture. She is now s/p ortho repair. She does not have any steps at her house and lives in the basement. She would like to come to the ARU as she is NWB and does need help to move around. She has a friend who lives with her and her sister lives close by.     ROS: No CP, SOB, dyspnea    Objective:  Patient Vitals for the past 24 hrs:   BP Temp Temp src Pulse Resp SpO2   05/09/24 1259 109/72 -- -- 97 -- --   05/09/24 0903 103/67 -- -- 86 -- 90 %   05/09/24 0901 117/79 98 °F (36.7 °C) Oral 93 17 94 %   05/09/24 0814 103/67 -- -- 86 -- 90 %   05/09/24 0348 110/71 98.3 °F (36.8 °C) Oral 92 18 100 %   05/09/24 0030 -- -- -- -- 18 --   05/08/24 2357 113/76 97.7 °F (36.5 °C) Oral 91 18 97 %   05/08/24 2110 129/81 98.1 °F (36.7 °C) Oral 94 16 97 %   05/08/24 2000 106/73 98 °F (36.7 °C) Oral (!) 101 18 93 %   05/08/24 1909 114/75 97.7 °F (36.5 °C) Oral (!) 101 18 93 %   05/08/24 1830 117/76 -- -- (!) 106 24 94 %   05/08/24 1810 123/81 -- -- (!) 107 14 93 %   05/08/24 1804 -- -- -- (!) 111 19 95 %   05/08/24 1800 124/72 97.6 °F (36.4 °C) Temporal (!) 105 24 93 %   05/08/24 1755 125/83 -- -- (!) 112 17 97 %   05/08/24 1750 133/85 -- -- (!) 109 26 94 %   05/08/24 1747 130/87 -- -- (!) 106 20 91 %   05/08/24 1745 (!) 135/112 98.3 °F (36.8 °C) Temporal (!) 106 15 92 %     Gen: No distress, pleasant.   HEENT: Normocephalic, atraumatic.   CV: No audible murmurs, well perfused 
with the patient and her family members in detail.  I recommended surgical intervention in the form of intramedullary nail stabilization.  Plan for a suprapatellar approach.  I reviewed the risk, benefits, alternatives, and standard postoperative recovery course.  Anticipate conservative management of associated proximal fibular fracture.  Tentative plan for OR tomorrow at 3 PM based on operating room resources.  N.p.o. in preparation for surgery.  IV Ancef on-call to the OR.  Anticipate 6 weeks toe-touch weightbearing restrictions postoperatively.  Hold anticoagulants.    Vasile Antunez MD

## 2024-05-13 NOTE — PLAN OF CARE
Problem: Discharge Planning  Goal: Discharge to home or other facility with appropriate resources  Outcome: Progressing     Problem: Pain  Goal: Verbalizes/displays adequate comfort level or baseline comfort level  Outcome: Progressing  Pt scoring pain on 0-10 scale. Pain medications given per MAR. Pt instructed to call out when pain level increasing. Call light within reach.      Problem: Safety - Adult  Goal: Free from fall injury  Outcome: Progressing  Bed in lowest position, wheels locked, 2/4 side rails up, nonskid footwear on. Bed/ chair check alarm in place, call light within reach. Pt instructed to call out when needing assistance. Pt stated understanding.      Problem: ABCDS Injury Assessment  Goal: Absence of physical injury  Outcome: Progressing     Problem: Skin/Tissue Integrity  Goal: Absence of new skin breakdown  Description: 1.  Monitor for areas of redness and/or skin breakdown  2.  Assess vascular access sites hourly  3.  Every 4-6 hours minimum:  Change oxygen saturation probe site  4.  Every 4-6 hours:  If on nasal continuous positive airway pressure, respiratory therapy assess nares and determine need for appliance change or resting period.  Outcome: Progressing

## 2024-05-14 ENCOUNTER — HOSPITAL ENCOUNTER (INPATIENT)
Age: 64
DRG: 560 | End: 2024-05-14
Attending: STUDENT IN AN ORGANIZED HEALTH CARE EDUCATION/TRAINING PROGRAM | Admitting: STUDENT IN AN ORGANIZED HEALTH CARE EDUCATION/TRAINING PROGRAM
Payer: MEDICARE

## 2024-05-14 VITALS
TEMPERATURE: 97.7 F | WEIGHT: 141 LBS | SYSTOLIC BLOOD PRESSURE: 103 MMHG | DIASTOLIC BLOOD PRESSURE: 69 MMHG | BODY MASS INDEX: 25.95 KG/M2 | HEIGHT: 62 IN | RESPIRATION RATE: 16 BRPM | OXYGEN SATURATION: 96 % | HEART RATE: 77 BPM

## 2024-05-14 DIAGNOSIS — G89.29 OTHER CHRONIC PAIN: ICD-10-CM

## 2024-05-14 PROBLEM — S82.202D: Status: ACTIVE | Noted: 2024-05-14

## 2024-05-14 PROBLEM — S82.402D: Status: ACTIVE | Noted: 2024-05-14

## 2024-05-14 LAB
ANION GAP SERPL CALCULATED.3IONS-SCNC: 11 MMOL/L (ref 3–16)
BUN SERPL-MCNC: 11 MG/DL (ref 7–20)
CALCIUM SERPL-MCNC: 9.9 MG/DL (ref 8.3–10.6)
CHLORIDE SERPL-SCNC: 104 MMOL/L (ref 99–110)
CO2 SERPL-SCNC: 24 MMOL/L (ref 21–32)
CREAT SERPL-MCNC: 0.6 MG/DL (ref 0.6–1.2)
DEPRECATED RDW RBC AUTO: 15.1 % (ref 12.4–15.4)
GFR SERPLBLD CREATININE-BSD FMLA CKD-EPI: >90 ML/MIN/{1.73_M2}
GLUCOSE SERPL-MCNC: 109 MG/DL (ref 70–99)
HCT VFR BLD AUTO: 26.6 % (ref 36–48)
HGB BLD-MCNC: 8.2 G/DL (ref 12–16)
MCH RBC QN AUTO: 28.9 PG (ref 26–34)
MCHC RBC AUTO-ENTMCNC: 30.8 G/DL (ref 31–36)
MCV RBC AUTO: 93.6 FL (ref 80–100)
PLATELET # BLD AUTO: 653 K/UL (ref 135–450)
PMV BLD AUTO: 6.9 FL (ref 5–10.5)
POTASSIUM SERPL-SCNC: 4.6 MMOL/L (ref 3.5–5.1)
RBC # BLD AUTO: 2.84 M/UL (ref 4–5.2)
SODIUM SERPL-SCNC: 139 MMOL/L (ref 136–145)
WBC # BLD AUTO: 10.9 K/UL (ref 4–11)

## 2024-05-14 PROCEDURE — 2580000003 HC RX 258: Performed by: ORTHOPAEDIC SURGERY

## 2024-05-14 PROCEDURE — 6370000000 HC RX 637 (ALT 250 FOR IP): Performed by: INTERNAL MEDICINE

## 2024-05-14 PROCEDURE — 6370000000 HC RX 637 (ALT 250 FOR IP): Performed by: STUDENT IN AN ORGANIZED HEALTH CARE EDUCATION/TRAINING PROGRAM

## 2024-05-14 PROCEDURE — 6370000000 HC RX 637 (ALT 250 FOR IP): Performed by: ORTHOPAEDIC SURGERY

## 2024-05-14 PROCEDURE — 6370000000 HC RX 637 (ALT 250 FOR IP): Performed by: SPECIALIST/TECHNOLOGIST

## 2024-05-14 PROCEDURE — 36415 COLL VENOUS BLD VENIPUNCTURE: CPT

## 2024-05-14 PROCEDURE — 1280000000 HC REHAB R&B

## 2024-05-14 PROCEDURE — 94640 AIRWAY INHALATION TREATMENT: CPT

## 2024-05-14 PROCEDURE — 80048 BASIC METABOLIC PNL TOTAL CA: CPT

## 2024-05-14 PROCEDURE — 6360000002 HC RX W HCPCS: Performed by: ORTHOPAEDIC SURGERY

## 2024-05-14 PROCEDURE — 85027 COMPLETE CBC AUTOMATED: CPT

## 2024-05-14 RX ORDER — SENNA AND DOCUSATE SODIUM 50; 8.6 MG/1; MG/1
2 TABLET, FILM COATED ORAL 2 TIMES DAILY PRN
Status: CANCELLED | OUTPATIENT
Start: 2024-05-14

## 2024-05-14 RX ORDER — MIDODRINE HYDROCHLORIDE 5 MG/1
5 TABLET ORAL
Status: DISCONTINUED | OUTPATIENT
Start: 2024-05-14 | End: 2024-05-28 | Stop reason: HOSPADM

## 2024-05-14 RX ORDER — TIZANIDINE 4 MG/1
4 TABLET ORAL EVERY 6 HOURS PRN
Status: DISCONTINUED | OUTPATIENT
Start: 2024-05-14 | End: 2024-05-28 | Stop reason: HOSPADM

## 2024-05-14 RX ORDER — MORPHINE SULFATE 15 MG/1
15 TABLET, FILM COATED, EXTENDED RELEASE ORAL EVERY 12 HOURS SCHEDULED
Status: DISCONTINUED | OUTPATIENT
Start: 2024-05-14 | End: 2024-05-28 | Stop reason: HOSPADM

## 2024-05-14 RX ORDER — DIPHENHYDRAMINE HCL 25 MG
25 TABLET ORAL EVERY 6 HOURS PRN
Status: DISCONTINUED | OUTPATIENT
Start: 2024-05-14 | End: 2024-05-28 | Stop reason: HOSPADM

## 2024-05-14 RX ORDER — HYDROCODONE BITARTRATE AND ACETAMINOPHEN 5; 325 MG/1; MG/1
2 TABLET ORAL EVERY 6 HOURS PRN
Status: CANCELLED | OUTPATIENT
Start: 2024-05-14

## 2024-05-14 RX ORDER — MECOBALAMIN 5000 MCG
5 TABLET,DISINTEGRATING ORAL NIGHTLY
Status: DISCONTINUED | OUTPATIENT
Start: 2024-05-14 | End: 2024-05-28 | Stop reason: HOSPADM

## 2024-05-14 RX ORDER — ENOXAPARIN SODIUM 100 MG/ML
40 INJECTION SUBCUTANEOUS DAILY
Status: DISCONTINUED | OUTPATIENT
Start: 2024-05-15 | End: 2024-05-28 | Stop reason: HOSPADM

## 2024-05-14 RX ORDER — TRAZODONE HYDROCHLORIDE 50 MG/1
500 TABLET ORAL NIGHTLY
Status: CANCELLED | OUTPATIENT
Start: 2024-05-14

## 2024-05-14 RX ORDER — SENNA AND DOCUSATE SODIUM 50; 8.6 MG/1; MG/1
2 TABLET, FILM COATED ORAL 2 TIMES DAILY PRN
Status: DISCONTINUED | OUTPATIENT
Start: 2024-05-14 | End: 2024-05-23

## 2024-05-14 RX ORDER — MIDODRINE HYDROCHLORIDE 5 MG/1
5 TABLET ORAL
Status: CANCELLED | OUTPATIENT
Start: 2024-05-14

## 2024-05-14 RX ORDER — ONDANSETRON 4 MG/1
4 TABLET, ORALLY DISINTEGRATING ORAL EVERY 8 HOURS PRN
Status: DISCONTINUED | OUTPATIENT
Start: 2024-05-14 | End: 2024-05-28 | Stop reason: HOSPADM

## 2024-05-14 RX ORDER — POLYETHYLENE GLYCOL 3350 17 G/17G
17 POWDER, FOR SOLUTION ORAL DAILY
Status: DISCONTINUED | OUTPATIENT
Start: 2024-05-15 | End: 2024-05-23

## 2024-05-14 RX ORDER — NALOXONE HYDROCHLORIDE 0.4 MG/ML
0.4 INJECTION, SOLUTION INTRAMUSCULAR; INTRAVENOUS; SUBCUTANEOUS PRN
Status: CANCELLED | OUTPATIENT
Start: 2024-05-14

## 2024-05-14 RX ORDER — MECOBALAMIN 5000 MCG
5 TABLET,DISINTEGRATING ORAL NIGHTLY
Status: CANCELLED | OUTPATIENT
Start: 2024-05-14

## 2024-05-14 RX ORDER — TRAZODONE HYDROCHLORIDE 50 MG/1
500 TABLET ORAL NIGHTLY
Status: DISCONTINUED | OUTPATIENT
Start: 2024-05-14 | End: 2024-05-28 | Stop reason: HOSPADM

## 2024-05-14 RX ORDER — ALBUTEROL SULFATE 90 UG/1
2 AEROSOL, METERED RESPIRATORY (INHALATION) EVERY 6 HOURS PRN
Status: DISCONTINUED | OUTPATIENT
Start: 2024-05-14 | End: 2024-05-28 | Stop reason: HOSPADM

## 2024-05-14 RX ORDER — PANTOPRAZOLE SODIUM 40 MG/1
40 TABLET, DELAYED RELEASE ORAL
Status: DISCONTINUED | OUTPATIENT
Start: 2024-05-14 | End: 2024-05-14

## 2024-05-14 RX ORDER — BISACODYL 10 MG
10 SUPPOSITORY, RECTAL RECTAL DAILY PRN
Status: CANCELLED | OUTPATIENT
Start: 2024-05-14

## 2024-05-14 RX ORDER — POLYETHYLENE GLYCOL 3350 17 G/17G
17 POWDER, FOR SOLUTION ORAL DAILY
Status: CANCELLED | OUTPATIENT
Start: 2024-05-15

## 2024-05-14 RX ORDER — ASPIRIN 81 MG/1
81 TABLET ORAL DAILY
Status: CANCELLED | OUTPATIENT
Start: 2024-05-15

## 2024-05-14 RX ORDER — DIPHENHYDRAMINE HCL 25 MG
25 TABLET ORAL EVERY 6 HOURS PRN
Status: CANCELLED | OUTPATIENT
Start: 2024-05-14

## 2024-05-14 RX ORDER — ONDANSETRON 4 MG/1
4 TABLET, ORALLY DISINTEGRATING ORAL EVERY 8 HOURS PRN
Status: CANCELLED | OUTPATIENT
Start: 2024-05-14

## 2024-05-14 RX ORDER — ACETAMINOPHEN 325 MG/1
650 TABLET ORAL EVERY 4 HOURS PRN
Status: DISCONTINUED | OUTPATIENT
Start: 2024-05-14 | End: 2024-05-28 | Stop reason: HOSPADM

## 2024-05-14 RX ORDER — BISACODYL 10 MG
10 SUPPOSITORY, RECTAL RECTAL DAILY PRN
Status: DISCONTINUED | OUTPATIENT
Start: 2024-05-14 | End: 2024-05-28 | Stop reason: HOSPADM

## 2024-05-14 RX ORDER — TIZANIDINE 4 MG/1
4 TABLET ORAL EVERY 6 HOURS PRN
Status: CANCELLED | OUTPATIENT
Start: 2024-05-14

## 2024-05-14 RX ORDER — AMITRIPTYLINE HYDROCHLORIDE 25 MG/1
25 TABLET, FILM COATED ORAL NIGHTLY
Status: DISCONTINUED | OUTPATIENT
Start: 2024-05-14 | End: 2024-05-28 | Stop reason: HOSPADM

## 2024-05-14 RX ORDER — ASPIRIN 81 MG/1
81 TABLET ORAL DAILY
Status: DISCONTINUED | OUTPATIENT
Start: 2024-05-15 | End: 2024-05-28 | Stop reason: HOSPADM

## 2024-05-14 RX ORDER — MORPHINE SULFATE 15 MG/1
15 TABLET, FILM COATED, EXTENDED RELEASE ORAL EVERY 12 HOURS SCHEDULED
Status: CANCELLED | OUTPATIENT
Start: 2024-05-14

## 2024-05-14 RX ORDER — AMITRIPTYLINE HYDROCHLORIDE 25 MG/1
25 TABLET, FILM COATED ORAL NIGHTLY
Status: CANCELLED | OUTPATIENT
Start: 2024-05-14

## 2024-05-14 RX ORDER — HYDROCODONE BITARTRATE AND ACETAMINOPHEN 5; 325 MG/1; MG/1
2 TABLET ORAL EVERY 6 HOURS PRN
Status: DISCONTINUED | OUTPATIENT
Start: 2024-05-14 | End: 2024-05-28 | Stop reason: HOSPADM

## 2024-05-14 RX ORDER — PANTOPRAZOLE SODIUM 40 MG/1
40 TABLET, DELAYED RELEASE ORAL
Status: CANCELLED | OUTPATIENT
Start: 2024-05-14

## 2024-05-14 RX ORDER — NALOXONE HYDROCHLORIDE 0.4 MG/ML
0.4 INJECTION, SOLUTION INTRAMUSCULAR; INTRAVENOUS; SUBCUTANEOUS PRN
Status: DISCONTINUED | OUTPATIENT
Start: 2024-05-14 | End: 2024-05-28 | Stop reason: HOSPADM

## 2024-05-14 RX ORDER — ALBUTEROL SULFATE 90 UG/1
2 AEROSOL, METERED RESPIRATORY (INHALATION) EVERY 6 HOURS PRN
Status: CANCELLED | OUTPATIENT
Start: 2024-05-14

## 2024-05-14 RX ORDER — TROSPIUM CHLORIDE 20 MG/1
20 TABLET, FILM COATED ORAL
Status: DISCONTINUED | OUTPATIENT
Start: 2024-05-14 | End: 2024-05-28 | Stop reason: HOSPADM

## 2024-05-14 RX ORDER — PANTOPRAZOLE SODIUM 40 MG/1
40 TABLET, DELAYED RELEASE ORAL
Status: DISCONTINUED | OUTPATIENT
Start: 2024-05-15 | End: 2024-05-28 | Stop reason: HOSPADM

## 2024-05-14 RX ORDER — ACETAMINOPHEN 325 MG/1
650 TABLET ORAL EVERY 4 HOURS PRN
Status: CANCELLED | OUTPATIENT
Start: 2024-05-14

## 2024-05-14 RX ORDER — ENOXAPARIN SODIUM 100 MG/ML
40 INJECTION SUBCUTANEOUS DAILY
Status: CANCELLED | OUTPATIENT
Start: 2024-05-15

## 2024-05-14 RX ORDER — HYDROCODONE BITARTRATE AND ACETAMINOPHEN 5; 325 MG/1; MG/1
2 TABLET ORAL EVERY 6 HOURS PRN
Status: DISCONTINUED | OUTPATIENT
Start: 2024-05-14 | End: 2024-05-14 | Stop reason: HOSPADM

## 2024-05-14 RX ADMIN — TIZANIDINE 4 MG: 4 TABLET ORAL at 23:25

## 2024-05-14 RX ADMIN — MIDODRINE HYDROCHLORIDE 5 MG: 5 TABLET ORAL at 08:37

## 2024-05-14 RX ADMIN — Medication 2 PUFF: at 22:29

## 2024-05-14 RX ADMIN — Medication 5 MG: at 20:59

## 2024-05-14 RX ADMIN — MIDODRINE HYDROCHLORIDE 5 MG: 5 TABLET ORAL at 17:00

## 2024-05-14 RX ADMIN — HYDROCODONE BITARTRATE AND ACETAMINOPHEN 2 TABLET: 5; 325 TABLET ORAL at 12:45

## 2024-05-14 RX ADMIN — PANTOPRAZOLE SODIUM 40 MG: 40 TABLET, DELAYED RELEASE ORAL at 17:00

## 2024-05-14 RX ADMIN — TRAZODONE HYDROCHLORIDE 500 MG: 50 TABLET ORAL at 20:59

## 2024-05-14 RX ADMIN — MORPHINE SULFATE 15 MG: 15 TABLET, FILM COATED, EXTENDED RELEASE ORAL at 08:37

## 2024-05-14 RX ADMIN — MORPHINE SULFATE 15 MG: 15 TABLET, FILM COATED, EXTENDED RELEASE ORAL at 20:59

## 2024-05-14 RX ADMIN — ASPIRIN 81 MG: 81 TABLET, COATED ORAL at 08:37

## 2024-05-14 RX ADMIN — POLYETHYLENE GLYCOL 3350 17 G: 17 POWDER, FOR SOLUTION ORAL at 08:38

## 2024-05-14 RX ADMIN — HYDROCODONE BITARTRATE AND ACETAMINOPHEN 2 TABLET: 5; 325 TABLET ORAL at 01:59

## 2024-05-14 RX ADMIN — TROSPIUM CHLORIDE 20 MG: 20 TABLET, FILM COATED ORAL at 17:46

## 2024-05-14 RX ADMIN — SODIUM CHLORIDE, PRESERVATIVE FREE 10 ML: 5 INJECTION INTRAVENOUS at 08:38

## 2024-05-14 RX ADMIN — AMITRIPTYLINE HYDROCHLORIDE 25 MG: 25 TABLET, FILM COATED ORAL at 20:58

## 2024-05-14 RX ADMIN — HYDROCODONE BITARTRATE AND ACETAMINOPHEN 2 TABLET: 5; 325 TABLET ORAL at 23:24

## 2024-05-14 RX ADMIN — DIPHENHYDRAMINE HYDROCHLORIDE 25 MG: 25 TABLET ORAL at 23:25

## 2024-05-14 RX ADMIN — MIDODRINE HYDROCHLORIDE 5 MG: 5 TABLET ORAL at 12:45

## 2024-05-14 RX ADMIN — HYDROCODONE BITARTRATE AND ACETAMINOPHEN 2 TABLET: 5; 325 TABLET ORAL at 06:13

## 2024-05-14 RX ADMIN — ENOXAPARIN SODIUM 40 MG: 100 INJECTION SUBCUTANEOUS at 08:38

## 2024-05-14 RX ADMIN — PANTOPRAZOLE SODIUM 40 MG: 40 TABLET, DELAYED RELEASE ORAL at 06:05

## 2024-05-14 ASSESSMENT — PAIN DESCRIPTION - DESCRIPTORS
DESCRIPTORS: ACHING;THROBBING
DESCRIPTORS: ACHING
DESCRIPTORS: ACHING;DISCOMFORT;THROBBING
DESCRIPTORS: SPASM
DESCRIPTORS: DISCOMFORT
DESCRIPTORS: ACHING

## 2024-05-14 ASSESSMENT — PAIN - FUNCTIONAL ASSESSMENT
PAIN_FUNCTIONAL_ASSESSMENT: INTOLERABLE, UNABLE TO DO ANY ACTIVE OR PASSIVE ACTIVITIES
PAIN_FUNCTIONAL_ASSESSMENT: PREVENTS OR INTERFERES WITH ALL ACTIVE AND SOME PASSIVE ACTIVITIES
PAIN_FUNCTIONAL_ASSESSMENT: ACTIVITIES ARE NOT PREVENTED
PAIN_FUNCTIONAL_ASSESSMENT: ACTIVITIES ARE NOT PREVENTED

## 2024-05-14 ASSESSMENT — PAIN DESCRIPTION - LOCATION
LOCATION: LEG;ANKLE
LOCATION: BUTTOCKS
LOCATION: COCCYX
LOCATION: LEG;BREAST
LOCATION: COCCYX;LEG
LOCATION: LEG
LOCATION: LEG

## 2024-05-14 ASSESSMENT — PAIN DESCRIPTION - ORIENTATION
ORIENTATION: MID
ORIENTATION: LEFT
ORIENTATION: RIGHT;LEFT

## 2024-05-14 ASSESSMENT — PAIN SCALES - GENERAL
PAINLEVEL_OUTOF10: 5
PAINLEVEL_OUTOF10: 7
PAINLEVEL_OUTOF10: 6
PAINLEVEL_OUTOF10: 7
PAINLEVEL_OUTOF10: 5
PAINLEVEL_OUTOF10: 7

## 2024-05-14 ASSESSMENT — PAIN DESCRIPTION - ONSET: ONSET: ON-GOING

## 2024-05-14 ASSESSMENT — PAIN DESCRIPTION - FREQUENCY: FREQUENCY: CONTINUOUS

## 2024-05-14 ASSESSMENT — PAIN DESCRIPTION - PAIN TYPE: TYPE: ACUTE PAIN;SURGICAL PAIN

## 2024-05-14 NOTE — PLAN OF CARE
Problem: Discharge Planning  Goal: Discharge to home or other facility with appropriate resources  5/14/2024 1156 by Yee Rivas RN  Outcome: Not Progressing  Flowsheets (Taken 5/14/2024 0830)  Discharge to home or other facility with appropriate resources: Identify barriers to discharge with patient and caregiver  5/14/2024 0008 by Aneta Ackerman RN  Problem: Pain  Goal: Verbalizes/displays adequate comfort level or baseline comfort level  5/14/2024 1156 by Yee Rivas RN  Outcome: Progressing  Flowsheets (Taken 5/14/2024 0830)  Verbalizes/displays adequate comfort level or baseline comfort level: Encourage patient to monitor pain and request assistance       Problem: Discharge Planning  Goal: Discharge to home or other facility with appropriate resources  5/14/2024 1156 by Yee Rivas RN  Outcome: Not Progressing

## 2024-05-14 NOTE — PROGRESS NOTES
[facility] Admission Assessment  [facility] Acute Rehab Unit    Patient:Radha Malin     Rehab Dx/Hx: Tibia/fibula fracture, shaft, left, closed, with routine healing, subsequent encounter [S82.202D, S82.402D]   :1960  MRN:[id number]  Date of Admit: 2024     Pain Effect on Sleep:  Over the past 5 days, how much of the time has pain made it hard for you to sleep at night?  []  0. Does not apply - I have not had any pain or hurting in the past 5 days  []  1. Rarely or not at all  []  2. Occasionally  [x]  3. Frequently  []  4. Almost constantly  []  8. Unable to answer    Over the past 5 days, how often have you limited your participation in rehabilitation therapy sessions due to pain?  []  0. Does not apply - I have not received rehabilitation therapy in the past 5 days  []  1. Rarely or not at all  []  2. Occasionally  [x]  3. Frequently  []  4. Almost constantly  []  8. Unable to answer    Pain Interference with Day-to-Day Activities:  Over the past 5 days, how often have you limited your day-to-day activities (excluding rehabilitation therapy session)?  []  1. Rarely or not at all  []  2. Occasionally  [x]  3. Frequently  []  4. Almost constantly  []  8. Unable to answer      High-Risk Drug Classes: Use and Indication   Is taking: Check if the pt is taking any medications by pharmacological classification  Indication noted: If column 1 is checked, check if there is an indication noted for all meds in the drug class Is taking  (check all that apply) Indication noted (check all that apply)   Antipsychotic [] []   Anticoagulant [x] [x]   Antibiotic [] []   Opioid [x] [x]   Antiplatelet [] []   Hypoglycemic (including insulin) [] []   None of the above [] []     Special Treatments, Procedures, and Programs   Check all of the following treatments, procedures, and programs that apply on admission.  On admission (check all that apply)   Cancer Treatments    A1. Chemotherapy []   A2. IV []   A3. Oral []

## 2024-05-14 NOTE — PLAN OF CARE
Problem: Discharge Planning  Goal: Discharge to home or other facility with appropriate resources  5/14/2024 0008 by Aneta Ackerman RN  Outcome: Progressing  5/13/2024 1508 by Jenny Ray RN  Outcome: Progressing     Problem: Pain  Goal: Verbalizes/displays adequate comfort level or baseline comfort level  5/14/2024 0008 by Aneta Ackerman RN  Outcome: Progressing  5/13/2024 1508 by Jenny Ray RN  Outcome: Progressing     Problem: Safety - Adult  Goal: Free from fall injury  5/14/2024 0008 by Aneta Ackerman RN  Outcome: Progressing  5/13/2024 1508 by Jenny Ray RN  Outcome: Progressing     Problem: ABCDS Injury Assessment  Goal: Absence of physical injury  5/14/2024 0008 by Aneta Ackerman RN  Outcome: Progressing  5/13/2024 1508 by Jenny Ray RN  Outcome: Progressing     Problem: Skin/Tissue Integrity  Goal: Absence of new skin breakdown  Description: 1.  Monitor for areas of redness and/or skin breakdown  2.  Assess vascular access sites hourly  3.  Every 4-6 hours minimum:  Change oxygen saturation probe site  4.  Every 4-6 hours:  If on nasal continuous positive airway pressure, respiratory therapy assess nares and determine need for appliance change or resting period.  5/14/2024 0008 by Aneta Ackerman RN  Outcome: Progressing  5/13/2024 1508 by Jenny Ray RN  Outcome: Progressing

## 2024-05-14 NOTE — CARE COORDINATION
Writer notes DC order, AD 5/14, waiting for fast appeal to come back it was started 5/13 @ 1500.     Justine Reyes RN]

## 2024-05-14 NOTE — CARE COORDINATION
Case Management ARU Admission Assessment   [facility]    Patient:Radha Malin     Rehab Dx/Hx: Tibia/fibula fracture, shaft, left, closed, with routine healing, subsequent encounter [S82.202D, S82.402D]   :1960  MRN:[id number]  Date of Admit: 2024  Room #: 0170/0170-01       Objective:  reviewed chart   complete initial assessment and review the role of Case Management while on the ARU. Patient educated on team conferences. Discussed family training with the patient/family on how it is encouraged on the unit. Order for \"discharge planning\" has been addressed.          Family Present: no   Primary : Matthew   [facility] Decision Maker:   Primary Decision Maker: Matthew Mahmood - Domestic Partner - [phone]    Secondary Decision Maker: Taina Lou - Brother/Sister - [phone]   Current PCP:  Terrell Perez DO       Admit date:  2024   Insurance: BCBS MEDICARE/[place] MEDIBLSocrative ESSENTIAL/PLUS    Precert required for SNF:  [] No       [x] Yes   3 Night stay required: [x] No       [] Yes   Admitting diagnosis: Tibia/fibula fracture, shaft, left, closed, with routine healing, subsequent encounter [S82.202D, S82.402D]       Current home situation: Independent plof rollator level. Lives with spouse in a 2 level home with level entry and walk-n-shower.   DME at home: [x] Walker>rollator     [x] Cane       [] RTS        [] BSC      [x] Shower Chair        [] O2       [] HHN     [] CPAP       [] BiPap      [] Hospital Bed       [] W/C        [] Other:    Medical concerns requiring training: Restrictions  Restrictions/Precautions  Restrictions/Precautions: Fall Risk;General Precautions;Weight Bearing  Lower Extremity Weight Bearing Restrictions  Left Lower Extremity Weight Bearing: Toe Touch Weight Bearing  Position Activity Restriction  Other position/activity restrictions: Ambulate pt, up as tolerated, Per orders: \"Touch down with minimal weight bearing on  operative leg just for balance\";   Medication concerns:  Require financial assistance with meds? [x] No       [] If yes, please explain:   [] Yes              Services prior to admission: none   Preference for HHC or OP Therapy: [] Home health       [] Outpatient       [x] No preference   Patient's goal(s): \"to walk\"    Working or volunteering PTA?        Transportation needs: Family can assist   Has lack of transportation kept you from medical appointments, meetings, work, or ADL's? [] Yes, it has kept me from medical appointments or from getting my meds  [] Yes, It has kept me from non-medical meetings, appointments, work, or getting things I need  [x] No  [] Pt unable to respond  [] Pt declines to respond     How often do you need to have someone help you when you read instructions, pamphlets, or other written material from your doctor or pharmacy? [] Never                             [] Always  [x] Rarely                            [] Patient unable to respond  [] Sometimes                     [] Pt declines to respond  [] Often   Active with: [] Senior services        [] [place] on aging         [] Other:         Dialysis Facility (if applicable) Name:  Address:  Dialysis Schedule:  Phone:  Fax:       Support system at home and in the community: family   Caregiver physical ability: [x] Cue patient for safety  [] Physical lift/lower: [] Light [] Moderate [] Heavy  [x] Cook / Clean  [x] Transport   Who will be the one to contact for family training: Matthew    24 hour care on discharge?: Pending therapy recommendations   What level does the patient need to be at to return home:  Mod i   Discharge plan:  Pending therapy recommendations   Barriers to discharge: Patient progress       Ethnicity: Are you of , /a, or Citizen of the [place] origin?  [x] No, not of , /a, or Citizen of the [place] origin  [] Yes, Kyrgyz, Kyrgyz American, Chicano/a  [] Yes, Belizean  [] Yes, Syrian  [] Yes, another , , or

## 2024-05-14 NOTE — CARE COORDINATION
CASE MANAGEMENT DISCHARGE SUMMARY      [facility]    Precertification completed: Yes  Hospital Exemption Notification (HENS) completed: N/A    IMM given: (date) 5/13/24    New Durable Medical Equipment ordered/agency: Deferred    Transportation: Bed     time: 1500      Confirmed discharge plan with:     Patient: yes     Family:  yes     Facility/Agency, name:  [facility] faxed   Phone number for report to facility: [facility]- [phone]     RN, name: Yee    Note: Discharging nurse to complete [facility], reconcile [facility], and place final copy with patient's discharge packet. RN to ensure that written prescriptions for  Level II medications are sent with patient to the facility as per protocol.      Justine Reyes RN

## 2024-05-14 NOTE — PLAN OF CARE
ARU PATIENT TREATMENT PLAN  [facility]  [address] [place]  [address]  [phone]    Radha Malin    : 1960  [id number]  MRN: [id number]   PHYSICIAN:  Shannan Alan MD  Primary Problem    Patient Active Problem List   Diagnosis    Pilon fracture    Arthritis of ankle joint    Community acquired bacterial pneumonia    Reactive airway disease    History of lung cancer    Chronic pain of right lower extremity    Tobacco use disorder    Painful orthopaedic hardware (HCC)    Fall at home, initial encounter    Closed displaced spiral fracture of shaft of left tibia    Chronic pain    Orthostatic hypotension    Tibia/fibula fracture, shaft, left, closed, with routine healing, subsequent encounter       Rehabilitation Diagnosis:     Tibia/fibula fracture, shaft, left, closed, with routine healing, subsequent encounter [, S8]       ADMIT DATE:2024    Patient Goals: \"to get up and walking\"     Admitting Impairments: Pt. Admitted d/t a tibial fracture resulting in Decreased functional mobility ;Decreased ADL status;Decreased strength;Decreased safe awareness;Decreased balance;Decreased endurance;Decreased cognition;Decreased high-level IADLs   Barriers:  level of assistance, availability of assistance, endurance, pain, comorbidities   Participation: Good     CARE PLAN     NURSING:  Radha Malin while on this unit will:     [] Be continent of bowel and bladder     [x] Have an adequate number of bowel movements  [] Urinate with no urinary retention >300ml in bladder  [] Complete bladder protocol with kitchen removal  [] Maintain O2 SATs at ___%  [x] Have pain managed while on ARU       [] Be pain free by discharge   [x] Have no skin breakdown while on ARU  [] Have improved skin integrity via wound measurements  [x] Have no signs/symptoms of infection at the wound site  [] Be free from injury during hospitalization   [] Complete education with  training, Patient/Caregiver education & training, Equipment evaluation, education, & procurement, Therapeutic activities, Co-Treatment      OCCUPATIONAL THERAPY:  Goals:             Short Term Goals  Time Frame for Short Term Goals: 5/22  Short Term Goal 1: Pt will perform LB dressing with mod A and AE PRN  Short Term Goal 2: Pt will perform toilet transfer with CGA  Short Term Goal 3: Pt will perform footwear with mod A and AE PRN  Short Term Goal 4: Pt will perform LB bathing with CGA :  Long Term Goals  Time Frame for Long Term Goals : 5/29  Long Term Goal 1: Pt will perform LB dressing mod I with AE PRN  Long Term Goal 2: Pt will perform LB bathing mod I  Long Term Goal 3: Pt will perform toilet transfer mod I  Long Term Goal 4: Pt will perform toileting task mod I  Long Term Goal 5: Pt will doff/don footwear mod I with AE PRN :    These goals were reviewed with this patient at the time of assessment and Radha Malin is in agreement    Plan of Care:  Pt to be seen 5 out of 7 days per week, 90   mins (exact) per day for 16 days (exact)  Current Treatment Recommendations: Strengthening, Balance training, Functional mobility training, Endurance training, Self-Care / ADL, Patient/Caregiver education & training, Safety education & training, Pain management, Positioning      SPEECH THERAPY: Goals will be left blank if speech is not following this patient.  Goals:      CASE MANAGEMENT:  Goals:   Assist patient/family with discharge planning, patient/family counseling,   and coordination with insurance during ARU stay.    QIM / [facility] SCORES:  ITEM CURRENT SCORE GOAL   Eating CARE Score: 5 Discharge Goal: Independent   Oral Hygiene CARE Score: 4 Discharge Goal: Independent   Toileting Hygiene CARE Score: 3 Discharge Goal: Independent   Shower/Bathe Self CARE Score: 88 Discharge Goal: Independent   Upper Body Dressing CARE Score: 4 Discharge Goal: Independent   Lower Body Dressing CARE Score: 88 Discharge Goal:  Home

## 2024-05-14 NOTE — H&P
Patient: Radha Malin  [id number]  Date: 5/14/2024      Chief Complaint: ankle pain    History of Present Illness/Hospital Course:  Radha Malin is a 63 year old female with a past medical history significant for chronic pain, postural hypotension, asthma, and recent breast reduction surgery who [facility] with left lower leg pain after a fall at home. She was found to have a left tibia-fibula fracture. Orthopedic Surgery was consulted and on 5/8 she underwent left segmental tibia shaft intramedullary nailing. Course notable for acute blood loss anemia. She was admitted to [facility] on 5/14/24 due to functional deficits below her baseline. Today Radha reports some continued ankle pain. Reviewed her home medication regimen. She otherwise reports feeling well and is happy to be in rehab.    Prior Level of Function:  Independent for self care, stairs, functional cognition  Needed some help for indoor mobility    Current Level of Function:  Supervision for lying to sitting on side of bed  Max assist for toileting hygiene  Dependent for sit to stand, chair/bed transfer, walk 10 feet     has a past medical history of ADHD (attention deficit hyperactivity disorder), Arthritis, Asthma, Cancer (HCC), History of blood transfusion, Postural hypotension, Recurrent sinus infections, Wears glasses, and Wears partial dentures.     has a past surgical history that includes Dental surgery; Knee arthroscopy; Carpal tunnel release; Nose surgery (Left); Tubal ligation; Ankle fracture surgery (Right, 08/04/2014); fracture surgery (Left); Lung removal, partial (Right); Ankle surgery (Right, 6/12/2023); and Tibia fracture surgery (Left, 5/8/2024).     reports that she quit smoking about 2 years ago. Her smoking use included cigarettes. She started smoking about 37 years ago. She has a 35.0 pack-year smoking history. She has never used smokeless tobacco. She reports that she does not currently use alcohol. She  m (5' 2\")     Psych: Stable mood, normal judgement, normal affect   Const: No distress  Eyes: Conjunctiva noninjected, no icterus noted; pupils equal, round, and reactive to light.   HENT: Atraumatic, normocephalic; Oral mucosa moist  Neck: Trachea midline, neck supple. No thyromegaly noted.  CV: Regular rate and rhythm, no murmur rub or gallop noted  Resp: Lungs clear to auscultation bilaterally, no rales wheezes or ronchi, no retractions. Respirations unlabored.   GI: Soft, nontender, nondistended. Normal bowel sounds. No palpable masses.   Neuro: Alert, oriented, appropriate. No cranial nerve deficits appreciated. Sensation intact to light touch. Motor examination reveals normal strength in all four limbs diffusely.   Skin: Normal temperature and turgor  MSK: Left ankle splinted and wrapped in ACE  Ext: No significant edema appreciated. No varicosities.    Lab Results   Component Value Date    WBC 10.9 05/14/2024    HGB 8.2 (L) 05/14/2024    HCT 26.6 (L) 05/14/2024    MCV 93.6 05/14/2024     (H) 05/14/2024     No results found for: \"INR\", \"PROTIME\"  Lab Results   Component Value Date    CREATININE 0.6 05/14/2024    BUN 11 05/14/2024     05/14/2024    K 4.6 05/14/2024     05/14/2024    CO2 24 05/14/2024     Lab Results   Component Value Date    ALT 9 (L) 05/07/2024    AST 10 (L) 05/07/2024    ALKPHOS 65 05/07/2024    BILITOT <0.2 05/07/2024       Most recent echocardiogram revealed EF 55%.    Most recent EKG revealed NSR.     IMAGING    XR Tibia Fibula 5/7/24  Acute spiral fracture of the mid to distal tibial diaphysis with up to mild  anterolateral displacement and minimal posteromedial angulation.  Acute  comminuted fracture of the proximal fibular diaphysis with minimal anterior  displacement and minimal posterior angulation.  Possible acute nondisplaced  fracture of the fibular lateral malleolus.  Joints maintain anatomic  alignment.  No obvious acute soft tissue abnormality.    The above

## 2024-05-14 NOTE — PROGRESS NOTES
NURSING ASSESSMENT: ARU ADMISSION  [facility]    Patient:Radha Malin     Rehab Dx/Hx: Tibia/fibula fracture, shaft, left, closed, with routine healing, subsequent encounter [S82.202D, S82.402D]   :1960  MRN:[id number]  Date of Admit: 2024  Room #: 0170/0170-01    Subjective:   Patient admitted to room 170 @ from C5 via bed.   Alert and oriented x4.  Oriented to room and call light system. Oriented to rehab routine and therapy schedules. Informed about care conferences and ordering of meals with PCA.    Drug / Medication Review:   Medications were reviewed by RN at time of admission  [x]  No potential or actual clinically significant medication issues were noted.   []  Potential or actual clinically significant medication issues were found and MD was notified. (Specified below if applicable)   []  Allergy to medication   []  Drug interactions (drug/drug, drug/food, drug/disease interactions)   []  Duplicate drug   []  Omission (drug missing from prescribed regimen)   []  Non adherence   []  Adverse reaction   []  Wrong patient, drug, dose, route, time error   []  Ineffective drug therapy    Section GG: Rolling Right and Left   []  Code 06, Independent: if the patient completes the activity by themself with no assistance from a helper.   []  Code 05, Setup or clean up assist: if the helper sets up or cleans up; patient completes activity   []  Code 04, Supervision or touching assist: If the helper provides verbal cues and/or touching/steadying and/or contact guard assistance as patient completes activity   []  Code 03, Partial/Moderate Assist: If the helper does LESS THAN HALF the effort. [place] lifts, holds, or supports trunk or limbs, but provides less than half the effort.   [x]  Code 02, Substantial/Maximal Assist: if the helper does MORE THAN HALF the effort. [place] lifts or holds trunk or limbs and provides more than half the effort.  []  Code 01,Dependent: If the helper

## 2024-05-14 NOTE — PLAN OF CARE
Problem: Discharge Planning  Goal: Discharge to home or other facility with appropriate resources  5/14/2024 1252 by Yee Rivas RN  Outcome: Adequate for Discharge  5/14/2024 1156 by Yee Rivas RN  Outcome: Not Progressing  Flowsheets (Taken 5/14/2024 0830)  Discharge to home or other facility with appropriate resources: Identify barriers to discharge with patient and caregiver  5/14/2024 0008 by Aneta Ackerman RN  Outcome: Progressing     Problem: Pain  Goal: Verbalizes/displays adequate comfort level or baseline comfort level  5/14/2024 1252 by Yee Rivas RN  Outcome: Adequate for Discharge  5/14/2024 1156 by Yee Rivas RN  Outcome: Progressing  Flowsheets (Taken 5/14/2024 0830)  Verbalizes/displays adequate comfort level or baseline comfort level: Encourage patient to monitor pain and request assistance  5/14/2024 0008 by Aneta Ackerman RN  Outcome: Progressing     Problem: Safety - Adult  Goal: Free from fall injury  5/14/2024 1252 by Yee Rivas RN  Outcome: Adequate for Discharge  5/14/2024 0008 by Aneta Ackerman RN  Outcome: Progressing     Problem: ABCDS Injury Assessment  Goal: Absence of physical injury  5/14/2024 1252 by Yee Rivas RN  Outcome: Adequate for Discharge  5/14/2024 0008 by Aneta Ackerman RN  Outcome: Progressing     Problem: Skin/Tissue Integrity  Goal: Absence of new skin breakdown  Description: 1.  Monitor for areas of redness and/or skin breakdown  2.  Assess vascular access sites hourly  3.  Every 4-6 hours minimum:  Change oxygen saturation probe site  4.  Every 4-6 hours:  If on nasal continuous positive airway pressure, respiratory therapy assess nares and determine need for appliance change or resting period.  5/14/2024 1252 by Yee Rivas RN  Outcome: Adequate for Discharge  5/14/2024 0008 by Aneta Ackerman RN  Outcome: Progressing     Problem: Discharge Planning  Goal: Discharge to home or other facility with appropriate resources  5/14/2024 1252 by Rob

## 2024-05-14 NOTE — DISCHARGE SUMMARY
Hospitalist Discharge Summary      Name:  Radha Malin /Age/Sex: 1960 (63 y.o. female)   Admit Date: 2024  Discharge Date: 24    MRN & CSN:  [id number] & [id number] Encounter Date and Time 24 6:50 AM EDT    Attending:  Pam Bach MD Discharging Provider: Pam Bach MD       Disposition: ARU    Admission Diagnoses:   Patient Active Problem List   Diagnosis    Pilon fracture    Arthritis of ankle joint    Community acquired bacterial pneumonia    Reactive airway disease    History of lung cancer    Chronic pain of right lower extremity    Tobacco use disorder    Painful orthopaedic hardware (HCC)    Fall at home, initial encounter    Closed displaced spiral fracture of shaft of left tibia    Chronic pain    Orthostatic hypotension       Discharge Diagnoses: Principal Problem:    Fall at home, initial encounter  Active Problems:    Closed displaced spiral fracture of shaft of left tibia  Resolved Problems:    * No resolved hospital problems. *      Code Status:  Full Code    Condition:  Stable    Discharge Diet: Diet:  ADULT DIET; Regular  ADULT ORAL NUTRITION SUPPLEMENT; Breakfast, Lunch, Dinner; Standard High Calorie/High Protein Oral Supplement    PCP to do list:  follow once discharged from ARU         Hospital Course: 62yo WF with PMHx sig for asthma and chronic pain - she is usually on MSER 15mg bid and [id number] tid prn and then had bilateral breast reduction. Unfortunately she fell at home and had large spiral fracture of her tibia with fibular fracture. She now has had OR repair.  Pt did have some issues with constipation which were cleared up with good bowel regimen.  She is a good candidate for ARU based on new spiral fracture and previous repair of other ankle with significant hardware in the other leg, mobility issues, lives alone, chronic narcotic use, recent bilateral breast reduction surgery.  I did change her breast dressings 
Pt discharged to ARU. Report called. VSS. All belongings sent with pt.  
Disp-20 tablet, R-0Print      Multiple Vitamins-Minerals (THERAPEUTIC MULTIVITAMIN-MINERALS) tablet Take 1 tablet by mouth dailyHistorical Med      calcium carbonate (OSCAL) 500 MG TABS tablet Take 1 tablet by mouth dailyHistorical Med           Discharge Medication List as of 5/14/2024  2:27 PM        STOP taking these medications       morphine (MSIR) 15 MG tablet Comments:   Reason for Stopping:         gabapentin (NEURONTIN) 400 MG capsule Comments:   Reason for Stopping:         alendronate (FOSAMAX) 70 MG tablet Comments:   Reason for Stopping:               Significant Test Results    XR TIBIA FIBULA LEFT (2 VIEWS)    Result Date: 5/8/2024  Radiology exam is complete. No Radiologist dictation. Please follow up with ordering provider.     FLUORO FOR SURGICAL PROCEDURES    Result Date: 5/8/2024  Fluoro used for positioning purposes only.  No radiologist’s report.     XR TIBIA FIBULA LEFT (2 VIEWS)    Result Date: 5/7/2024  EXAMINATION: 2 XRAY VIEWS OF THE LEFT TIBIA AND FIBULA 5/7/2024 6:31 am COMPARISON: None HISTORY: ORDERING SYSTEM PROVIDED HISTORY: fall, leg injury TECHNOLOGIST PROVIDED HISTORY: Reason for exam:->fall, leg injury Reason for Exam: pt fell   images taken while lt knee in ap position and lt ankle in lateral position FINDINGS: Acute spiral fracture of the mid to distal tibial diaphysis with up to mild anterolateral displacement and minimal posteromedial angulation.  Acute comminuted fracture of the proximal fibular diaphysis with minimal anterior displacement and minimal posterior angulation.  Possible acute nondisplaced fracture of the fibular lateral malleolus.  Joints maintain anatomic alignment.  No obvious acute soft tissue abnormality.     Acute left tibial and fibular fractures as above.       Consults:     IP CONSULT TO ORTHOPEDIC SURGERY  IP CONSULT TO PHYSICAL MEDICINE REHAB  IP CONSULT TO GENERAL SURGERY    Labs:     Recent Labs     05/14/24  1152   WBC 10.9   HGB 8.2*   HCT 26.6*

## 2024-05-14 NOTE — RT PROTOCOL NOTE
RT Inhaler-Nebulizer Bronchodilator Protocol Note    There is a bronchodilator order in the chart from a provider indicating to follow the RT Bronchodilator Protocol and there is an “Initiate RT Inhaler-Nebulizer Bronchodilator Protocol” order as well (see protocol at bottom of note).    CXR Findings:  No results found.    The findings from the last RT Protocol Assessment were as follows:   History Pulmonary Disease: Smoker 15 pack years or more  Respiratory Pattern: Regular pattern and RR 12-20 bpm  Breath Sounds: Clear breath sounds  Cough: Strong, spontaneous, non-productive  Indication for Bronchodilator Therapy:    Bronchodilator Assessment Score: 1    Aerosolized bronchodilator medication orders have been revised according to the RT Inhaler-Nebulizer Bronchodilator Protocol below.    Respiratory Therapist to perform RT Therapy Protocol Assessment initially then follow the protocol.  Repeat RT Therapy Protocol Assessment PRN for score 0-3 or on second treatment, BID, and PRN for scores above 3.    No Indications - adjust the frequency to every 6 hours PRN wheezing or bronchospasm, if no treatments needed after 48 hours then discontinue using Per Protocol order mode.     If indication present, adjust the RT bronchodilator orders based on the Bronchodilator Assessment Score as indicated below.  Use Inhaler orders unless patient has one or more of the following: on home nebulizer, not able to hold breath for 10 seconds, is not alert and oriented, cannot activate and use MDI correctly, or respiratory rate 25 breaths per minute or more, then use the equivalent nebulizer order(s) with same Frequency and PRN reasons based on the score.  If a patient is on this medication at home then do not decrease Frequency below that used at home.    0-3 - enter or revise RT bronchodilator order(s) to equivalent RT Bronchodilator order with Frequency of every 4 hours PRN for wheezing or increased work of breathing using Per  Protocol order mode.        4-6 - enter or revise RT Bronchodilator order(s) to two equivalent RT bronchodilator orders with one order with BID Frequency and one order with Frequency of every 4 hours PRN wheezing or increased work of breathing using Per Protocol order mode.        7-10 - enter or revise RT Bronchodilator order(s) to two equivalent RT bronchodilator orders with one order with TID Frequency and one order with Frequency of every 4 hours PRN wheezing or increased work of breathing using Per Protocol order mode.       11-13 - enter or revise RT Bronchodilator order(s) to one equivalent RT bronchodilator order with QID Frequency and an Albuterol order with Frequency of every 4 hours PRN wheezing or increased work of breathing using Per Protocol order mode.      Greater than 13 - enter or revise RT Bronchodilator order(s) to one equivalent RT bronchodilator order with every 4 hours Frequency and an Albuterol order with Frequency of every 2 hours PRN wheezing or increased work of breathing using Per Protocol order mode.     RT to enter RT Home Evaluation for COPD & MDI Assessment order using Per Protocol order mode.    Electronically signed by Veronika Pryor on 5/14/2024 at 3:48 PM

## 2024-05-14 NOTE — PROGRESS NOTES
Hospitalist Progress Note    CC: Fall at home, initial encounter    Name:  Radha Malin /Age/Sex: 1960  (63 y.o. female)   MRN & CSN:  [id number] & [id number] Encounter Date/Time: 2024 11:48 AM EDT   Location:  0542/0542-01 PCP: Terrell Perez DO     Attending:Pam Bach MD         Hospital course:  62yo WF with PMHx sig for asthma and chronic pain - she is usually on MSER 15mg bid and [id number] tid prn and then had bilateral breast reduction. Unfortunately she fell at home and had large spiral fracture of her tibia with fibular fracture.  She now has had OR repair.  She is asking if a breast surgeon can look at her breasts on Monday - we will try to see if one can see her.    Admit date: 2024  Days in hospital:  Hospital Day: 5  Status:  Inpatient       24 Hour Events: pt has large doses of IV morphine available - I cannot justify IV morphine oral norco and oral percocet - that is also a lot of tylenol    Subjective: pt is on a lot of narcotic medication - she is on norco and percocet and IV morphine every 2 hours - at some point we need to transition her back to her chronic pain medications which are MS morphine 15mg bid and norco 10mg tid -  pt is now extremely constipated    ROS:   A comprehensive review of systems was negative except for: severe constipation, ongoing leg pain      Objective:    /64   Pulse 99   Temp 97.8 °F (36.6 °C) (Oral)   Resp 16   Ht 1.575 m (5' 2\")   Wt 64 kg (141 lb)   LMP 2011   SpO2 97%   BMI 25.79 kg/m²     Gen: NAD  HEENT: NC/AT, moist mucous membranes, no oropharyngeal erythema or exudate  Neck: supple, trachea midline, no anterior cervical or SC LAD  Heart:  reg rate and rhythm, no murmurs, no gallops, no rubs. np leg edema  Lungs: diminished bilaterally  Abd: soft, non-tender, non-distended, normal bowel sounds, no masses or organomegaly  Extrem:  no clubbing, cyanosis, or 
                                                    Hospitalist Progress Note    CC: Fall at home, initial encounter    Name:  Radha Malin /Age/Sex: 1960  (63 y.o. female)   MRN & CSN:  [id number] & [id number] Encounter Date/Time: 2024 11:48 AM EDT   Location:  0542/0542-01 PCP: Terrell Perez DO     Attending:Pam Bach MD         Hospital course:  62yo WF with PMHx sig for asthma and chronic pain - she is usually on MSER 15mg bid and [id number] tid prn and then had bilateral breast reduction. Unfortunately she fell at home and had large spiral fracture of her tibia with fibular fracture.  She now has had OR repair.  She is asking if a breast surgeon can look at her breasts on Monday - we will try to see if one can see her.    Admit date: 2024  Days in hospital:  Hospital Day: 6  Status:  Inpatient       24 Hour Events: laxative appears to have worked    Subjective:will transition back to scheduled morphine ER today - off of IV morphine, back to norco 10mg 5x daily prn - doing better overall - asked for sleep agent and her inhaler again    ROS:   A comprehensive review of systems was negative except for: severe constipation relieved, leg pain better       Objective:    /64   Pulse 88   Temp 97.9 °F (36.6 °C) (Oral)   Resp 14   Ht 1.575 m (5' 2\")   Wt 64 kg (141 lb)   LMP 2011   SpO2 95%   BMI 25.79 kg/m²     Gen: NAD  HEENT: NC/AT, moist mucous membranes, no oropharyngeal erythema or exudate  Neck: supple, trachea midline, no anterior cervical or SC LAD  Heart:  reg rate and rhythm, no murmurs, no gallops, no rubs. np leg edema  Lungs: diminished bilaterally  Abd: soft, non-tender, non-distended, normal bowel sounds, no masses or organomegaly  Extrem:  no clubbing, cyanosis, or edema, no ulcers, no Betsy's sign, and L leg wrapped  Skin: Warm  Psych: A & O x3  Neuro: grossly intact, moves all 4 extremities    Assessment:    Principal Problem:    Fall at home, 
  Department of Orthopedic Surgery  Physician Assistant   Progress Note    Subjective:       Systemic or Specific Complaints:Pain Control and is having trouble getting comfortable, pain in the knee and mid shin. Tolerating PO, voiding. No family at bedside    Objective:     Patient Vitals for the past 24 hrs:   BP Temp Temp src Pulse Resp SpO2   05/09/24 0903 103/67 -- -- 86 -- 90 %   05/09/24 0901 117/79 98 °F (36.7 °C) Oral 93 17 94 %   05/09/24 0814 103/67 -- -- 86 -- 90 %   05/09/24 0348 110/71 98.3 °F (36.8 °C) Oral 92 18 100 %   05/09/24 0030 -- -- -- -- 18 --   05/08/24 2357 113/76 97.7 °F (36.5 °C) Oral 91 18 97 %   05/08/24 2110 129/81 98.1 °F (36.7 °C) Oral 94 16 97 %   05/08/24 2000 106/73 98 °F (36.7 °C) Oral (!) 101 18 93 %   05/08/24 1909 114/75 97.7 °F (36.5 °C) Oral (!) 101 18 93 %   05/08/24 1830 117/76 -- -- (!) 106 24 94 %   05/08/24 1810 123/81 -- -- (!) 107 14 93 %   05/08/24 1804 -- -- -- (!) 111 19 95 %   05/08/24 1800 124/72 97.6 °F (36.4 °C) Temporal (!) 105 24 93 %   05/08/24 1755 125/83 -- -- (!) 112 17 97 %   05/08/24 1750 133/85 -- -- (!) 109 26 94 %   05/08/24 1747 130/87 -- -- (!) 106 20 91 %   05/08/24 1745 (!) 135/112 98.3 °F (36.8 °C) Temporal (!) 106 15 92 %   05/08/24 1324 (!) 92/57 96.9 °F (36.1 °C) Oral 97 16 95 %   05/08/24 1152 111/72 -- -- 94 -- --   05/08/24 1005 95/60 98.1 °F (36.7 °C) Oral 89 14 90 %       General: alert, appears stated age, cooperative, and no distress   Wound: Wound clean and dry no evidence of infection. and No Drainage   Motion: Painful range of Motion in affected extremity   DVT Exam: No evidence of DVT seen on physical exam.     Additional exam: Patient seen laying in bed with LLE elevated at time of interview  Leg lengths equal, rotational alignment neutral   Foot swelling as expected, compartments compressible  No drainage to post op dressing  EHL, FHL, gastroc, and anterior tibialis motor intact  Sensation intact to light touch  Cap refill 
  Department of Orthopedic Surgery  Physician Assistant   Progress Note    Subjective:     Post op day 2 IM tibial Nail left leg  Systemic or Specific Complaints:Pain Control and is having trouble getting comfortable, pain in the knee and mid shin. Tolerating PO, voiding. No family at bedside. Patient noting fatigue and dizziness hemoglobin dropped to 7.1 this AM. Difficulty working with therapy     Objective:     Patient Vitals for the past 24 hrs:   BP Temp Temp src Pulse Resp SpO2   05/10/24 1158 -- -- -- -- -- 94 %   05/10/24 0922 102/65 -- -- 81 -- 98 %   05/10/24 0832 102/68 97.6 °F (36.4 °C) Oral 83 16 94 %   05/10/24 0600 -- -- -- -- -- 93 %   05/10/24 0430 -- -- -- -- 14 --   05/10/24 0345 100/63 98.5 °F (36.9 °C) Oral 78 16 92 %   05/09/24 2212 -- -- -- -- 16 --   05/09/24 2130 133/75 -- -- -- -- --   05/09/24 2022 -- -- -- -- 16 --   05/09/24 1945 (!) 151/85 98.1 °F (36.7 °C) Oral (!) 104 16 96 %   05/09/24 1713 135/86 -- -- 73 -- --   05/09/24 1259 109/72 -- -- 97 -- --         General: alert, appears stated age, cooperative, and no distress   Wound: Wound clean and dry no evidence of infection. and No Drainage   Motion: Painful range of Motion in affected extremity   DVT Exam: No evidence of DVT seen on physical exam.     Additional exam: Patient seen laying in bed with LLE elevated at time of interview  Leg lengths equal, rotational alignment neutral   Foot swelling as expected, compartments compressible  No drainage to post op dressing  EHL, FHL, gastroc, and anterior tibialis motor intact  Sensation intact to light touch  Cap refill brisk      Data Review  CBC:   Lab Results   Component Value Date/Time    WBC 9.1 05/10/2024 05:43 AM    RBC 2.48 05/10/2024 05:43 AM    HGB 7.1 05/10/2024 05:43 AM    HCT 21.6 05/10/2024 05:43 AM     05/10/2024 05:43 AM       Renal:   Lab Results   Component Value Date/Time     05/10/2024 05:43 AM    K 3.5 05/10/2024 05:43 AM     05/10/2024 05:43 AM    
  Department of Orthopedic Surgery  Physician Assistant   Progress Note    Subjective:     Systemic or Specific Complaints:Pain Control improving. Working with therapy but still using steady due to BLE weakness and NWB to the LLE. Dressing changed yesterday. Tolerating PO, voiding appropriately. No family at bedside.     Objective:     Patient Vitals for the past 24 hrs:   BP Temp Temp src Pulse Resp SpO2   05/13/24 1209 116/74 -- -- 97 -- --   05/13/24 1130 104/72 98 °F (36.7 °C) Oral 93 -- 94 %   05/13/24 1033 111/77 -- -- 76 -- 96 %   05/13/24 0928 106/69 97.7 °F (36.5 °C) -- 81 16 97 %   05/12/24 2100 107/77 97.6 °F (36.4 °C) Oral (!) 103 16 95 %   05/12/24 1645 108/73 -- -- -- -- --       General: alert, appears stated age, cooperative, and no distress   Wound: Wound clean and dry no evidence of infection. and No Drainage   Motion: Painful range of Motion in affected extremity   DVT Exam: No evidence of DVT seen on physical exam.     Additional exam: Patient seen sitting up in chair with LLE elevated on footstool at time of interview  Leg lengths equal, rotational alignment neutral  Mepilex dressings changed, ace wrap applied  Mild ecchymosis to the midshaft tibia. Moderate swelling, calf compartments compressible  EHL, FHL, gastroc, and anterior tibialis motor intact  Sensation intact to light touch  Cap refill brisk      Data Review  CBC:   Lab Results   Component Value Date/Time    WBC 9.5 05/11/2024 05:49 AM    RBC 2.64 05/11/2024 05:49 AM    HGB 7.7 05/11/2024 05:49 AM    HCT 23.0 05/11/2024 05:49 AM     05/11/2024 05:49 AM       Renal:   Lab Results   Component Value Date/Time     05/10/2024 05:43 AM    K 3.5 05/10/2024 05:43 AM     05/10/2024 05:43 AM    CO2 26 05/10/2024 05:43 AM    BUN 8 05/10/2024 05:43 AM    CREATININE <0.5 05/10/2024 05:43 AM    GLUCOSE 88 05/10/2024 05:43 AM    CALCIUM 8.8 05/10/2024 05:43 AM          Assessment:      left segmental tibia shaft intramedullary 
  Hospital Medicine Progress Note      Date of Admission: 5/7/2024  Hospital Day: 2    Chief Admission Complaint:  fall     Subjective:  pain still severe despite increased pain regimen. Plan for OR today.    Presenting Admission History:       63 y.o. female who presented to [facility] with fall.  PMHx significant for asthma, chronic pain. Patient had breast reduction surgery yesterday. She was recovering at home when she fell overnight despite using a walker. She had immediate severe pain in her left lower leg. Patient has found to have a large spiral fracture of her tibia associated with a fibular fracture.     Assessment/Plan:      Current Principal Problem:  Fall at home, initial encounter    Tib-fib fracture 2/2 fall  - orthopedic surgery consulted  - plan for OR today  - pain control ordered  - low cardiac risk. Tolerated surgery well prior to admission     S/p breast reduction surgery  - follow up with breast surgeon next week as scheduled     Asthma  - no evidence of exacerbation  - continue inhalers     Chronic pain  - OARRS reviewed  - continue home norco. IV morphine ordered in place of PO morphine ER    Physical Exam Performed:      General appearance:  No apparent distress  Respiratory:  Normal respiratory effort.   Cardiovascular:  Regular rate and rhythm.  Abdomen:  Soft, non-tender, non-distended.  Musculoskeletal:  No edema  Neurologic:  Non-focal  Psychiatric:  Alert and oriented    BP (!) 92/57   Pulse 97   Temp 96.9 °F (36.1 °C) (Oral)   Resp 16   Ht 1.575 m (5' 2\")   Wt 64 kg (141 lb)   LMP 09/22/2011   SpO2 95%   BMI 25.79 kg/m²     Diet: Diet NPO Exceptions are: Ice Chips, Sips of Water with Meds  DVT Prophylaxis: [x]PPx LMWH  []SQ Heparin  []IPC/SCDs  []Eliquis  []Xarelto  []Coumadin  []Other -      Code status: Full Code  PT/OT Eval Status:   [x]NOT yet ordered  []Ordered and Pending   []Seen with Recommendations for:  []Home independently  []Home w/ assist  []HHC  []SNF  
  Hospital Medicine Progress Note      Date of Admission: 5/7/2024  Hospital Day: 3    Chief Admission Complaint:  fall     Subjective:  pain still severe despite increased pain regimen. Plan for OR today.    Presenting Admission History:       63 y.o. female who presented to [facility] with fall.  PMHx significant for asthma, chronic pain. Patient had breast reduction surgery yesterday. She was recovering at home when she fell overnight despite using a walker. She had immediate severe pain in her left lower leg. Patient has found to have a large spiral fracture of her tibia associated with a fibular fracture.     Assessment/Plan:      Current Principal Problem:  Fall at home, initial encounter    Tib-fib fracture 2/2 fall  - orthopedic surgery consulted  - S/P OR repair  - pain control ordered     S/p breast reduction surgery  - follow up with breast surgeon next week as scheduled     Asthma  - no evidence of exacerbation  - continue inhalers     Chronic pain  - OARRS reviewed  - continue home norco. IV morphine ordered in place of PO morphine ER    Physical Exam Performed:      General appearance:  No apparent distress  Respiratory:  Normal respiratory effort.   Cardiovascular:  Regular rate and rhythm.  Abdomen:  Soft, non-tender, non-distended.  Musculoskeletal:  No edema  Neurologic:  Non-focal  Psychiatric:  Alert and oriented    /72   Pulse 97   Temp 98 °F (36.7 °C) (Oral)   Resp 17   Ht 1.575 m (5' 2\")   Wt 64 kg (141 lb)   LMP 09/22/2011   SpO2 90%   BMI 25.79 kg/m²     Diet: ADULT DIET; Regular  ADULT ORAL NUTRITION SUPPLEMENT; Breakfast, Lunch, Dinner; Standard High Calorie/High Protein Oral Supplement  DVT Prophylaxis: [x]PPx LMWH  []SQ Heparin  []IPC/SCDs  []Eliquis  []Xarelto  []Coumadin  []Other -      Code status: Full Code  PT/OT Eval Status:   []NOT yet ordered  []Ordered and Pending   [x]Seen with Recommendations for:  []Home independently  []Home w/ assist  []HHC  []SNF  
  Hospital Medicine Progress Note      Date of Admission: 5/7/2024  Hospital Day: 4    Chief Admission Complaint:  fall     Subjective:  pain still severe despite increased pain regimen. Plan for OR today.    Presenting Admission History:       63 y.o. female who presented to [facility] with fall.  PMHx significant for asthma, chronic pain. Patient had breast reduction surgery yesterday. She was recovering at home when she fell overnight despite using a walker. She had immediate severe pain in her left lower leg. Patient has found to have a large spiral fracture of her tibia associated with a fibular fracture.     Assessment/Plan:      Current Principal Problem:  Fall at home, initial encounter    Tib-fib fracture 2/2 fall  - orthopedic surgery consulted  - S/P OR repair  - pain control ordered  - PT/OT     S/p breast reduction surgery  - follow up with breast surgeon next week as scheduled     Asthma  - no evidence of exacerbation  - continue inhalers     Chronic pain  - OARRS reviewed  - continue home norco. IV morphine ordered in place of PO morphine ER    Physical Exam Performed:      General appearance:  No apparent distress  Respiratory:  Normal respiratory effort.   Cardiovascular:  Regular rate and rhythm.  Abdomen:  Soft, non-tender, non-distended.  Musculoskeletal:  No edema  Neurologic:  Non-focal  Psychiatric:  Alert and oriented    /65   Pulse 81   Temp 97.6 °F (36.4 °C) (Oral)   Resp 16   Ht 1.575 m (5' 2\")   Wt 64 kg (141 lb)   LMP 09/22/2011   SpO2 94%   BMI 25.79 kg/m²     Diet: ADULT DIET; Regular  ADULT ORAL NUTRITION SUPPLEMENT; Breakfast, Lunch, Dinner; Standard High Calorie/High Protein Oral Supplement  DVT Prophylaxis: [x]PPx LMWH  []SQ Heparin  []IPC/SCDs  []Eliquis  []Xarelto  []Coumadin  []Other -      Code status: Full Code  PT/OT Eval Status:   []NOT yet ordered  []Ordered and Pending   [x]Seen with Recommendations for:  []Home independently  []Home w/ assist  [][facility]  
  Hospital Medicine Progress Note      Date of Admission: 5/7/2024  Hospital Day: 8    Chief Admission Complaint:  fall     Subjective: She is sitting up in bed, in no acute distress.  Has no complaints at this time.     Presenting Admission History:       63 y.o. female who presented to [facility] with fall. PMHx significant for asthma, chronic pain/on long-term narcotics. Patient had breast reduction surgery the day prior to this admission .  she was recovering at home when she fell despite using a walker. She had immediate severe pain in her left lower leg. Patient has found to have a  spiral fracture of her left tibia associated with a fibular fracture.   She was seen by orthopedics in consultation.  She did go for left segmental tibia shaft intramedullary nailing on 5 /8/24.       Assessment/Plan:      Current Principal Problem:  Fall at home, initial encounter    Left Tib-fib fracture 2/2 fall  - orthopedic surgery consulted  She did go for left segmental tibia shaft intramedullary nailing on 5 /8/24.   - PT/OT  Left lower extremity toe-touch weightbearing.     S/p breast reduction surgery  - follow up with breast surgeon next week as scheduled     Asthma  - no evidence of exacerbation  - continue inhalers     Chronic pain  - OARRS reviewed  - continue home norco.   She is also on scheduled MS Contin 15 mg every 12 hours     Depression: Mood is currently stable, continue on trazodone and amitriptyline       Physical Exam Performed:      General appearance: In bed, in no apparent distress  Respiratory: Bilateral breath sounds, no rales no rhonchi noted, normal respiratory effort.   Cardiovascular: S1, S2 regular rate and rhythm.  Abdomen: Positive bowel sounds, soft, non-tender, non-distended.  Musculoskeletal:  No edema, left lower extremity wrapped in dressing is clean and dry  Neurologic:  Non-focal  Psychiatric:  Alert and oriented x 3    /69   Pulse 77   Temp 97.7 °F (36.5 °C) (Oral)   Resp 15 
4 Eyes Skin Assessment     NAME:  Radha Malin  YOB: 1960  MEDICAL RECORD NUMBER:  [id number]    The patient is being assessed for  Admission    I agree that at least one RN has performed a thorough Head to Toe Skin Assessment on the patient. ALL assessment sites listed below have been assessed.      Areas assessed by both nurses:    Head, Face, Ears, Shoulders, Back, Chest, Arms, Elbows, Hands, Sacrum. Buttock, Coccyx, Ischium, Legs. Feet and Heels, and Under Medical Devices         Does the Patient have a Wound? No noted wound(s)     Patient does have incisions MARIA ALEJANDRA bilat breasts (had breast reduction 5/6, unable to removed bandages)  Osmany Prevention initiated by RN: No  Wound Care Orders initiated by RN: No    Pressure Injury (Stage 3,4, Unstageable, DTI, NWPT, and Complex wounds) if present, place Wound referral order by RN under : No    New Ostomies, if present place, Ostomy referral order under : No     Nurse 1 eSignature: Electronically signed by Adrianna Gruber RN on 5/7/24 at 12:27 PM EDT    **SHARE this note so that the co-signing nurse can place an eSignature**    Nurse 2 eSignature: Electronically signed by Sunni Sandoval RN on 5/7/24 at 12:29 PM EDT   
Acewrap and white dressing removed per SHARON Lu note on POD3. Roll gauze and ace reapplied after 1 hr. X3 mepilex dressings Clean, Dry and Intact.      Kamryn Lang RN    
Occupational Therapy  Facility/Department: [facility] C5 - MED SURG/ORTHO  Daily Treatment Note  NAME: Radha Malin  : 1960  MRN: [id number]    Date of Service: 5/10/2024    Discharge Recommendations:  IP Rehab       Therapy discharge recommendations are subject to collaboration from the patient’s interdisciplinary healthcare team, including MD and case management recommendations.  Barriers to Home Discharge:   [] Steps to access home entry or bed/bath   [x] Unable to transfer, ambulate, or propel wheelchair household distances without assist   [x] Limited available assist at home upon discharge    [x] Patient or family requests d/c to post-acute facility    [x] Poor cognition/safety awareness for d/c to home alone    [] Unable to maintain ordered weight bearing status    [] Patient with salient signs of long-standing immobility   [x] Decreased independence with ADLs   [x] Increased risk for falls     Patient Diagnosis(es): The primary encounter diagnosis was Closed displaced spiral fracture of shaft of left tibia, initial encounter. Diagnoses of Fall, initial encounter and Fall at home, initial encounter were also pertinent to this visit.     Assessment    Assessment: Pt tolerated session fair overall, requires rest breaks between all activities, verbalizes SOB with activity (SPO2 stable). Pt requiring any where from min A of 2 for transfers to mod-max A of 2 as pt demos difficulty using UE to maintain balance, utilizes LUE greater than RUE requiring stabilization of RW. Co-tx collaboration this date with PT staff to safely progress pt toward goals. Pt will have better occupational performance outcomes within a co-treatment than 1:1 session. Pt functioning below her baseline, motivated to participate in therapies and would benefit from continued skilled OT in IPR setting at d/c.   Activity Tolerance: Patient limited by pain;Patient limited by endurance  Discharge Recommendations: IP Rehab      Plan 
Occupational Therapy  Facility/Department: [place] [address] - MED SURG/ORTHO  Treatment Note    Name: Radha Malin  : 1960  MRN: [id number]  Date of Service: 2024    Discharge Recommendations:  IP Rehab  Therapy discharge recommendations take into account each patient's current medical complexities and are subject to input/oversight from the patient's healthcare team.   Barriers to Home Discharge:   [] Steps to access home entry or bed/bath:   [x] Unable to transfer, ambulate, or propel wheelchair household distances without assist   [x] Limited available assist at home upon discharge    [x] Patient or family requests d/c to post-acute facility      [x]Unable to maintain ordered weight bearing status       If pt is unable to be seen after this session, please let this note serve as discharge summary.  Please see case management note for discharge disposition.  Thank you.           Patient Diagnosis(es): The primary encounter diagnosis was Closed displaced spiral fracture of shaft of left tibia, initial encounter. Diagnoses of Fall, initial encounter and Fall at home, initial encounter were also pertinent to this visit.  Past Medical History:  has a past medical history of ADHD (attention deficit hyperactivity disorder), Arthritis, Asthma, Cancer (HCC), History of blood transfusion, Postural hypotension, Recurrent sinus infections, Wears glasses, and Wears partial dentures.  Past Surgical History:  has a past surgical history that includes Dental surgery; Knee arthroscopy; Carpal tunnel release; Nose surgery (Left); Tubal ligation; Ankle fracture surgery (Right, 2014); fracture surgery (Left); Lung removal, partial (Right); Ankle surgery (Right, 2023); and Tibia fracture surgery (Left, 2024).           Assessment   Performance deficits / Impairments: Decreased functional mobility ;Decreased ADL status;Decreased strength;Decreased safe awareness;Decreased balance;Decreased endurance;Decreased 
Patient arrived to PACU bay 5, phase one initiated. Placed on bedside monitor, VSS. Report obtained from OR RN and anesthesia. Patient on O2 via nasal cannula at 4L. See flowsheets for assessment. Warm blankets applied. Side rails in place, will monitor patient closely.   
Physical Therapy  Facility/Department: [facility] C[facility] - MED SURG/ORTHO  Daily Treatment Note  NAME: Radha Malin  : 1960  MRN: [id number]    Date of Service: 2024    Discharge Recommendations:  IP Rehab   PT Equipment Recommendations  Equipment Needed: No  Other: defer to next level of care.    Patient Diagnosis(es): The primary encounter diagnosis was Closed displaced spiral fracture of shaft of left tibia, initial encounter. Diagnoses of Fall, initial encounter, Fall at home, initial encounter, and Other chronic pain were also pertinent to this visit.    Assessment   Assessment: Pt seen in conjunction with OT to maximize pt safety and mobility and safely assess pt maximal level of mobility.  pt demonstrates CGA for bed mobility, MIN A X2 for functional transfers with RW. Pt ambulated 6' mod A of 2 using RW and TTWB LLE.  pt performs several exercises seated with rests as necessary. Pt continues to demonstrate decreased strength, endurance, mobility, activity tolerance, balance, and functional capacity below their baseline and would benefit from skilled PT to address the above stated deficits during their stay in the acute care setting.  Recommend DC to IP rehab when medically stable.  Activity Tolerance: Patient limited by pain;Patient limited by endurance  Equipment Needed: No  Other: defer to next level of care.     Plan    Physical Therapy Plan  General Plan: 5-7 times per week  Current Treatment Recommendations: Strengthening;Balance training;Functional mobility training;Transfer training;Endurance training;Gait training;Home exercise program;Safety education & training;Patient/Caregiver education & training;Equipment evaluation, education, & procurement;Therapeutic activities;Co-Treatment     Restrictions  Restrictions/Precautions  Restrictions/Precautions: Fall Risk, General Precautions, Weight Bearing  Lower Extremity Weight Bearing Restrictions  Left Lower Extremity Weight Bearing: Toe Touch Weight 
Physical Therapy  Facility/Department: [facility] - MED SURG/ORTHO  Daily Treatment Note  NAME: Radha Malin  : 1960  MRN: [id number]    Date of Service: 2024    Discharge Recommendations:  IP Rehab   PT Equipment Recommendations  Equipment Needed: No  Other: defer to next level of care.    Therapy discharge recommendations take into account each patient's current medical complexities and are subject to input/oversight from the patient's healthcare team.   Barriers to Home Discharge:   [] Steps to access home entry or bed/bath:   [x] Unable to transfer, ambulate, or propel wheelchair household distances without assist   [x] Unable to perform ADLs without assist   [x] Limited available assist at home upon discharge    [] Patient or family requests d/c to post-acute facility    [] Poor cognition/safety awareness for d/c to home alone    [] Unable to maintain ordered weight bearing status    [x] Patient with salient signs of long-standing immobility   [] Other: pt with recent orthopedic surgery, pt newly NWB on LLE, pt at increased risk for falls.    Patient Diagnosis(es): The primary encounter diagnosis was Closed displaced spiral fracture of shaft of left tibia, initial encounter. Diagnoses of Fall, initial encounter and Fall at home, initial encounter were also pertinent to this visit.    Assessment   Assessment: Pt seen in conjunction with OT to maximize pt safety and mobility and safely assess pt maximal level of mobility.  pt demonstrates CGA for bed mobility, MIN A X2 for functional transfers with RW.  pt able to do a much better job maintaining TTWB status on LLE on today's date, continues to orequrie VCs to maintain WB status.  pt performs several exercises in seated with foot rest and without foot rest.  Pt continues to demonstrate decreased strength, endurance, mobility, activity tolerance, balance, and functional capacity below their baseline and would benefit from skilled PT to address the 
Physical Therapy  Facility/Department: [place] [address] - MED SURG/ORTHO  Physical Therapy Initial Assessment    Name: Radha Malin  : 1960  MRN: [id number]  Date of Service: 2024    Discharge Recommendations:  IP Rehab   PT Equipment Recommendations  Other: TBD per accepting facility    Therapy discharge recommendations take into account each patient's current medical complexities and are subject to input/oversight from the patient's healthcare team.   Barriers to Home Discharge:   [] Steps to access home entry or bed/bath:   [x] Unable to transfer, ambulate, or propel wheelchair household distances without assist   [x] Limited available assist at home upon discharge    [x] Patient or family requests d/c to post-acute facility    [x] Poor cognition/safety awareness for d/c to home alone    [x]Unable to maintain ordered weight bearing status    [] Patient with salient signs of long-standing immobility   [x] Patient is at risk for falls due to: history of falls, pain, cognition, decreased activity tolerance   [] Other:    If pt is unable to be seen after this session, please let this note serve as discharge summary.  Please see case management note for discharge disposition.  Thank you.        Patient Diagnosis(es): The primary encounter diagnosis was Closed displaced spiral fracture of shaft of left tibia, initial encounter. A diagnosis of Fall, initial encounter was also pertinent to this visit.  Past Medical History:  has a past medical history of ADHD (attention deficit hyperactivity disorder), Arthritis, Asthma, Cancer (HCC), History of blood transfusion, Postural hypotension, Recurrent sinus infections, Wears glasses, and Wears partial dentures.  Past Surgical History:  has a past surgical history that includes Dental surgery; Knee arthroscopy; Carpal tunnel release; Nose surgery (Left); Tubal ligation; Ankle fracture surgery (Right, 2014); fracture surgery (Left); Lung removal, partial (Right); and 
Physical Therapy  Facility/Department: [facility] - MED SURG/ORTHO  Daily Treatment Note  NAME: Radha Malin  : 1960  MRN: [id number]    Date of Service: 5/10/2024    Discharge Recommendations:  IP Rehab   PT Equipment Recommendations  Other: TBD per accepting facility    Patient Diagnosis(es): The primary encounter diagnosis was Closed displaced spiral fracture of shaft of left tibia, initial encounter. Diagnoses of Fall, initial encounter and Fall at home, initial encounter were also pertinent to this visit.    Assessment   Assessment: Pt progressing with PT needing PT/OT co treat to safely progress OOB to chair using RW TTWB LLE.  Pt required CGA/SBA for bed mobility, min A Of 2 STS and mod A of 2 to pivot bed to cair using RW TTWB.  Pt was anxuious with subsequent standing trials and demonstrated difficulty following commands for proper and safe technique.  Pt particpated in BLE exs seated, she is recommended for con't skilled PT and IPR at D/C.  Activity Tolerance: Patient tolerated evaluation without incident;Patient limited by pain  Other: TBD per accepting facility     Plan    Physical Therapy Plan  General Plan: 5-7 times per week  Current Treatment Recommendations: Strengthening;Balance training;Functional mobility training;Transfer training;Endurance training;Gait training;Home exercise program;Safety education & training;Patient/Caregiver education & training;Equipment evaluation, education, & procurement;Therapeutic activities;Co-Treatment     Restrictions  Restrictions/Precautions  Restrictions/Precautions: Fall Risk, General Precautions, Weight Bearing  Lower Extremity Weight Bearing Restrictions  Left Lower Extremity Weight Bearing: Toe Touch Weight Bearing  Position Activity Restriction  Other position/activity restrictions: Ambulate pt, up as tolerated, Per orders: \"Touch down with minimal weight bearing on operative leg just for balance\"     Subjective    Subjective  Subjective: 
Pts left ankle images with left tib fib order   
Radha Malin  5/12/2024  [id number]    Chief Complaint: Fall at home, initial encounter    Subjective:   This is a 62yo F who has a past medical history including:  Past Medical History:   Diagnosis Date    ADHD (attention deficit hyperactivity disorder)     Arthritis     Asthma     Cancer (HCC)     lung    History of blood transfusion     Postural hypotension     Recurrent sinus infections     Wears glasses     Wears partial dentures     implants on bottom to hold partial / partial on top also     Who came in post fall and was seen to have left tib/fib fracture. She is now s/p ortho repair. She does not have any steps at her house and lives in the basement. She would like to come to the ARU as she is NWB and does need help to move around. She has a friend who lives with her and her sister lives close by.     Interval history: Seen in her room this morning. She is doing well and hopeful to come to the ARU. No new pain overnight.     ROS: No CP, SOB, dyspnea    Objective:  Patient Vitals for the past 24 hrs:   BP Temp Temp src Pulse Resp SpO2   05/12/24 2100 107/77 97.6 °F (36.4 °C) Oral (!) 103 16 95 %   05/12/24 1645 108/73 -- -- -- -- --   05/12/24 1437 -- -- -- -- 14 --   05/12/24 1147 110/73 97.4 °F (36.3 °C) Oral 89 14 94 %   05/12/24 0809 108/64 97.9 °F (36.6 °C) Oral 88 14 95 %     Gen: No distress, pleasant.   HEENT: Normocephalic, atraumatic.   CV: No audible murmurs, well perfused extremities  Resp: No respiratory distress. No increased WOB  Abd: Soft, nontender nondistended  Ext: left mild edema.   Neuro: Alert, oriented, appropriately interactive.     Laboratory data: Available via EMR.     Therapy progress:    PT    Rolling: Level of difficulty - A Little   Sit to Stand from a Chair: Level of difficulty - A Lot  Supine to Sit: Level of difficulty - A Little     Bed to Chair: Physical Assistance Required - A Lot  Ambulate Across Room: Physical Assistance Required - A Lot  Ascend 3-5 Steps With HR: 
Radha Malin  5/13/2024  [id number]    Chief Complaint: Fall at home, initial encounter    Subjective:   This is a 64yo F who has a past medical history including:  Past Medical History:   Diagnosis Date    ADHD (attention deficit hyperactivity disorder)     Arthritis     Asthma     Cancer (HCC)     lung    History of blood transfusion     Postural hypotension     Recurrent sinus infections     Wears glasses     Wears partial dentures     implants on bottom to hold partial / partial on top also     Who came in post fall and was seen to have left tib/fib fracture. She is now s/p ortho repair. She does not have any steps at her house and lives in the basement. She would like to come to the ARU as she is NWB and does need help to move around. She has a friend who lives with her and her sister lives close by.     Interval history: No acute events overnight. Today Radha is seen in her room. She reports continued pain. She lives in the basement of her home with a level entry and a roommate. She is motivated to work with therapies and is hopeful to be approved for IPR.     ROS: No CP, SOB, dyspnea    Objective:  Patient Vitals for the past 24 hrs:   BP Temp Temp src Pulse Resp SpO2   05/13/24 0928 106/69 97.7 °F (36.5 °C) -- 81 16 97 %   05/12/24 2100 107/77 97.6 °F (36.4 °C) Oral (!) 103 16 95 %   05/12/24 1645 108/73 -- -- -- -- --   05/12/24 1437 -- -- -- -- 14 --   05/12/24 1147 110/73 97.4 °F (36.3 °C) Oral 89 14 94 %     Gen: No distress, pleasant.   HEENT: Normocephalic, atraumatic.   CV: No audible murmurs, well perfused extremities  Resp: No respiratory distress. No increased WOB  Abd: Soft, nontender nondistended  Ext: left mild edema.   Neuro: Alert, oriented, appropriately interactive.     Laboratory data: Available via EMR.     Therapy progress:    PT    Rolling: Level of difficulty - A Little   Sit to Stand from a Chair: Level of difficulty - A Lot  Supine to Sit: Level of difficulty - A Little     Bed to 
Received pt from IP room to [facility] room 4 to prep for surgery. Consents reviewed.  
Pt agreeable to OT session this date  Orientation  Overall Orientation Status: Within Functional Limits  Orientation Level: Oriented X4  Pain: .5-6/10 when resting  Cognition  Overall Cognitive Status: Exceptions  Arousal/Alertness: Delayed responses to stimuli  Following Commands: Follows one step commands with increased time  Attention Span: Attends with cues to redirect  Safety Judgement: Decreased awareness of need for assistance;Decreased awareness of need for safety  Problem Solving: Assistance required to generate solutions;Assistance required to implement solutions  Insights: Decreased awareness of deficits  Initiation: Requires cues for some  Sequencing: Requires cues for some        Objective    Vitals     Vitals:    05/13/24 1130   BP: 104/72   Pulse: 93   Resp:    Temp: 98 °F (36.7 °C)   SpO2: 94%       Bed Mobility Training  Bed Mobility Training: Yes  Interventions: Safety awareness training;Verbal cues  Supine to Sit: Contact-guard assistance;Additional time;Adaptive equipment  Scooting: Stand-by assistance;Contact-guard assistance (to EOB)  Duration: 10  Balance  Sitting: High guard  Standing: Impaired  Standing - Static: Constant support;Fair (RW)  Standing - Dynamic: Constant support;Poor (RW)  Transfer Training  Transfer Training: Yes  Interventions: Verbal cues;Safety awareness training  Sit to Stand: Minimum assistance;Assist X2;Adaptive equipment;Additional time (RW)  Stand to Sit: Minimum assistance;Assist X2;Additional time;Adaptive equipment (RW)  Bed to Chair: Minimum assistance;Assist X2;Additional time;Adaptive equipment (RW)  Gait Training  Left Side Weight Bearing: Toe touch  Gait  Gait Training: Yes  Left Side Weight Bearing: Toe touch  Overall Level of Assistance: Moderate assistance;Assist X2;Additional time;Adaptive equipment (RW)  Distance (ft): 6 Feet  Assistive Device: Gait belt;Walker, rolling  Interventions: Safety awareness training;Verbal cues;Tactile cues;Weight shifting 
progress:    PT    Rolling: Level of difficulty - A Little   Sit to Stand from a Chair: Level of difficulty - A Lot  Supine to Sit: Level of difficulty - A Little     Bed to Chair: Physical Assistance Required - A Lot  Ambulate Across Room: Physical Assistance Required - A Lot  Ascend 3-5 Steps With HR: Physical Assistance Required - A Lot    OT    Eating: Physical Assistance Required - None  Grooming: Physical Assistance Required - A Little  LB Dressing: Physical Assistance Required - Total  UB Dressing: Physical Assistance Required - A Little  Bathing: Physical Assistance Required - A Lot  Toileting: Physical Assistance Required - Total    SLP         Body mass index is 25.79 kg/m².    Assessment:  Patient Active Problem List   Diagnosis    Pilon fracture    Arthritis of ankle joint    Community acquired bacterial pneumonia    Reactive airway disease    History of lung cancer    Chronic pain of right lower extremity    Tobacco use disorder    Painful orthopaedic hardware (HCC)    Fall at home, initial encounter       Plan:   Awaiting insurance precert     Patient with new functional deficits and ongoing medical complexity. Demonstrates ability to tolerate 3 hours therapy/day and requires close physician oversight to manage acute medical issues. Radha Malin is a good candidate for acute inpatient rehab when medically appropriate.    Thank you for this consult. Please contact me with any questions or concerns.      CORNELIUS Arroyo.P.H  PM&R  5/10/2024  2:31 PM      * This document was created using dictation software.  While all precautions were taken to ensure accuracy, errors may have occurred.  Please disregard any typographical errors.    
Continued education needed     AM-PAC - ADL  AM-PAC Daily Activity - Inpatient   How much help is needed for putting on and taking off regular lower body clothing?: Total  How much help is needed for bathing (which includes washing, rinsing, drying)?: A Lot  How much help is needed for toileting (which includes using toilet, bedpan, or urinal)?: A Lot  How much help is needed for putting on and taking off regular upper body clothing?: A Little  How much help is needed for taking care of personal grooming?: A Little  How much help for eating meals?: None  AM-[facility] Inpatient Daily Activity Raw Score: 15  AM-PAC Inpatient ADL T-Scale Score : 34.69  ADL Inpatient CMS 0-100% Score: 56.46  ADL Inpatient CMS G-Code Modifier : CK    Goals  Short Term Goals  Time Frame for Short Term Goals: 1 week- 5/16  Short Term Goal 1: Pt will perform LB dressing with SBA  Short Term Goal 2: Pt will perform functional transfer/ toilet transfer with SBA while maintaining TTWB px  Short Term Goal 3: Pt will perform toileting with SBA  Short Term Goal 4: Pt will perform 5 x 20 reps BUE ex to incr strength for ADLs and fxl transfers by 5/12  Patient Goals   Patient goals : \"to walk\"       Therapy Time   Individual Concurrent Group Co-treatment   Time In       0805   Time Out       0841   Minutes       36   Timed Code Treatment Minutes: 26 Minutes (+10 min eval)     If pt is unable to be seen after this session, please let this note serve as discharge summary.  Please see case management note for discharge disposition.  Thank you.     Albert Daniel, OT

## 2024-05-15 LAB
ANION GAP SERPL CALCULATED.3IONS-SCNC: 11 MMOL/L (ref 3–16)
BASOPHILS # BLD: 0.2 K/UL (ref 0–0.2)
BASOPHILS NFR BLD: 2 %
BUN SERPL-MCNC: 11 MG/DL (ref 7–20)
CALCIUM SERPL-MCNC: 9.3 MG/DL (ref 8.3–10.6)
CHLORIDE SERPL-SCNC: 103 MMOL/L (ref 99–110)
CO2 SERPL-SCNC: 26 MMOL/L (ref 21–32)
CREAT SERPL-MCNC: 0.6 MG/DL (ref 0.6–1.2)
DEPRECATED RDW RBC AUTO: 14.1 % (ref 12.4–15.4)
EOSINOPHIL # BLD: 0.2 K/UL (ref 0–0.6)
EOSINOPHIL NFR BLD: 2 %
GFR SERPLBLD CREATININE-BSD FMLA CKD-EPI: >90 ML/MIN/{1.73_M2}
GLUCOSE SERPL-MCNC: 85 MG/DL (ref 70–99)
HCT VFR BLD AUTO: 21.6 % (ref 36–48)
HGB BLD-MCNC: 7.1 G/DL (ref 12–16)
LYMPHOCYTES # BLD: 1.5 K/UL (ref 1–5.1)
LYMPHOCYTES NFR BLD: 18 %
MCH RBC QN AUTO: 28.7 PG (ref 26–34)
MCHC RBC AUTO-ENTMCNC: 32.9 G/DL (ref 31–36)
MCV RBC AUTO: 87.3 FL (ref 80–100)
METAMYELOCYTES NFR BLD MANUAL: 1 %
MONOCYTES # BLD: 0.7 K/UL (ref 0–1.3)
MONOCYTES NFR BLD: 8 %
MYELOCYTES NFR BLD MANUAL: 4 %
NEUTROPHILS # BLD: 6 K/UL (ref 1.7–7.7)
NEUTROPHILS NFR BLD: 65 %
PLATELET # BLD AUTO: 597 K/UL (ref 135–450)
PLATELET BLD QL SMEAR: ABNORMAL
PMV BLD AUTO: 6.7 FL (ref 5–10.5)
POLYCHROMASIA BLD QL SMEAR: ABNORMAL
POTASSIUM SERPL-SCNC: 4.3 MMOL/L (ref 3.5–5.1)
RBC # BLD AUTO: 2.48 M/UL (ref 4–5.2)
SLIDE REVIEW: ABNORMAL
SODIUM SERPL-SCNC: 140 MMOL/L (ref 136–145)
WBC # BLD AUTO: 8.6 K/UL (ref 4–11)

## 2024-05-15 PROCEDURE — 1280000000 HC REHAB R&B

## 2024-05-15 PROCEDURE — 80048 BASIC METABOLIC PNL TOTAL CA: CPT

## 2024-05-15 PROCEDURE — 97162 PT EVAL MOD COMPLEX 30 MIN: CPT

## 2024-05-15 PROCEDURE — 97116 GAIT TRAINING THERAPY: CPT

## 2024-05-15 PROCEDURE — 36415 COLL VENOUS BLD VENIPUNCTURE: CPT

## 2024-05-15 PROCEDURE — 6370000000 HC RX 637 (ALT 250 FOR IP): Performed by: STUDENT IN AN ORGANIZED HEALTH CARE EDUCATION/TRAINING PROGRAM

## 2024-05-15 PROCEDURE — 97167 OT EVAL HIGH COMPLEX 60 MIN: CPT

## 2024-05-15 PROCEDURE — 97110 THERAPEUTIC EXERCISES: CPT

## 2024-05-15 PROCEDURE — 6360000002 HC RX W HCPCS: Performed by: STUDENT IN AN ORGANIZED HEALTH CARE EDUCATION/TRAINING PROGRAM

## 2024-05-15 PROCEDURE — 97530 THERAPEUTIC ACTIVITIES: CPT

## 2024-05-15 PROCEDURE — 97535 SELF CARE MNGMENT TRAINING: CPT

## 2024-05-15 PROCEDURE — 85025 COMPLETE CBC W/AUTO DIFF WBC: CPT

## 2024-05-15 RX ADMIN — ASPIRIN 81 MG: 81 TABLET, COATED ORAL at 09:16

## 2024-05-15 RX ADMIN — TIZANIDINE 4 MG: 4 TABLET ORAL at 20:47

## 2024-05-15 RX ADMIN — PANTOPRAZOLE SODIUM 40 MG: 40 TABLET, DELAYED RELEASE ORAL at 06:08

## 2024-05-15 RX ADMIN — MORPHINE SULFATE 15 MG: 15 TABLET, FILM COATED, EXTENDED RELEASE ORAL at 19:38

## 2024-05-15 RX ADMIN — AMITRIPTYLINE HYDROCHLORIDE 25 MG: 25 TABLET, FILM COATED ORAL at 20:47

## 2024-05-15 RX ADMIN — TROSPIUM CHLORIDE 20 MG: 20 TABLET, FILM COATED ORAL at 17:43

## 2024-05-15 RX ADMIN — HYDROCODONE BITARTRATE AND ACETAMINOPHEN 2 TABLET: 5; 325 TABLET ORAL at 19:38

## 2024-05-15 RX ADMIN — ENOXAPARIN SODIUM 40 MG: 100 INJECTION SUBCUTANEOUS at 09:15

## 2024-05-15 RX ADMIN — TRAZODONE HYDROCHLORIDE 500 MG: 50 TABLET ORAL at 20:46

## 2024-05-15 RX ADMIN — Medication 5 MG: at 20:47

## 2024-05-15 RX ADMIN — MIDODRINE HYDROCHLORIDE 5 MG: 5 TABLET ORAL at 17:43

## 2024-05-15 RX ADMIN — TROSPIUM CHLORIDE 20 MG: 20 TABLET, FILM COATED ORAL at 06:08

## 2024-05-15 RX ADMIN — HYDROCODONE BITARTRATE AND ACETAMINOPHEN 2 TABLET: 5; 325 TABLET ORAL at 12:25

## 2024-05-15 RX ADMIN — POLYETHYLENE GLYCOL 3350 17 G: 17 POWDER, FOR SOLUTION ORAL at 09:15

## 2024-05-15 RX ADMIN — HYDROCODONE BITARTRATE AND ACETAMINOPHEN 2 TABLET: 5; 325 TABLET ORAL at 06:08

## 2024-05-15 RX ADMIN — MIDODRINE HYDROCHLORIDE 5 MG: 5 TABLET ORAL at 09:15

## 2024-05-15 RX ADMIN — MORPHINE SULFATE 15 MG: 15 TABLET, FILM COATED, EXTENDED RELEASE ORAL at 09:15

## 2024-05-15 RX ADMIN — MIDODRINE HYDROCHLORIDE 5 MG: 5 TABLET ORAL at 12:25

## 2024-05-15 ASSESSMENT — PAIN DESCRIPTION - ORIENTATION
ORIENTATION: RIGHT
ORIENTATION: LEFT

## 2024-05-15 ASSESSMENT — PAIN DESCRIPTION - FREQUENCY: FREQUENCY: CONTINUOUS

## 2024-05-15 ASSESSMENT — PAIN - FUNCTIONAL ASSESSMENT
PAIN_FUNCTIONAL_ASSESSMENT: ACTIVITIES ARE NOT PREVENTED

## 2024-05-15 ASSESSMENT — PAIN DESCRIPTION - DESCRIPTORS
DESCRIPTORS: ACHING;DISCOMFORT
DESCRIPTORS: SPASM
DESCRIPTORS: ACHING;STABBING
DESCRIPTORS: SPASM
DESCRIPTORS: ACHING;DISCOMFORT

## 2024-05-15 ASSESSMENT — PAIN SCALES - GENERAL
PAINLEVEL_OUTOF10: 8
PAINLEVEL_OUTOF10: 6
PAINLEVEL_OUTOF10: 8
PAINLEVEL_OUTOF10: 7
PAINLEVEL_OUTOF10: 6

## 2024-05-15 ASSESSMENT — PAIN DESCRIPTION - LOCATION
LOCATION: LEG;ANKLE
LOCATION: LEG
LOCATION: LEG
LOCATION: LEG;ANKLE
LOCATION: HIP

## 2024-05-15 ASSESSMENT — PAIN DESCRIPTION - ONSET: ONSET: PROGRESSIVE

## 2024-05-15 ASSESSMENT — PAIN DESCRIPTION - PAIN TYPE: TYPE: ACUTE PAIN;SURGICAL PAIN

## 2024-05-15 NOTE — PROGRESS NOTES
Radha Malin  5/15/2024  [id number]    Chief Complaint: Tibia/fibula fracture, shaft, left, closed, with routine healing, subsequent encounter    Subjective:   No acute events overnight. Today Radha is seen in her room with her significant other and nursing present. She reports continued pain and difficulty sleeping. She is motivated to work with therapies.     ROS: denies f/c, n/v, cp, sob    Objective:  Patient Vitals for the past 24 hrs:   BP Temp Temp src Pulse Resp SpO2 Height Weight   05/15/24 1255 -- -- -- -- 16 -- -- --   05/15/24 1225 -- -- -- -- 16 -- -- --   05/15/24 1215 (!) 101/53 -- -- 86 -- -- -- --   05/15/24 1013 108/64 -- -- 90 -- 95 % -- --   05/15/24 0945 -- -- -- -- 16 -- -- --   05/15/24 0900 104/75 97.8 °F (36.6 °C) Oral 90 16 94 % -- --   05/15/24 0608 -- -- -- -- 20 -- -- --   05/14/24 2354 -- -- -- -- 16 -- -- --   05/14/24 2324 -- -- -- -- 20 -- -- --   05/14/24 2129 -- -- -- -- 16 -- -- --   05/14/24 2059 128/82 98.3 °F (36.8 °C) Oral (!) 108 16 98 % -- --   05/14/24 1715 -- -- -- -- -- -- -- 64 kg (141 lb 1.5 oz)   05/14/24 1659 123/72 -- -- 86 -- -- -- --   05/14/24 1529 108/68 98.2 °F (36.8 °C) Oral 95 16 94 % 1.575 m (5' 2\") --     Gen: No distress, pleasant.   HEENT: Normocephalic, atraumatic.   CV: extremities well perfused   Resp: No respiratory distress.   Abd: Soft, nontender  Ext: No edema.   Neuro: Alert, oriented, appropriately interactive.     Wt Readings from Last 3 Encounters:   05/14/24 64 kg (141 lb 1.5 oz)   05/07/24 64 kg (141 lb)   06/12/23 62.6 kg (138 lb)       Laboratory data:   Lab Results   Component Value Date    WBC 8.6 05/15/2024    HGB 7.1 (L) 05/15/2024    HCT 21.6 (L) 05/15/2024    MCV 87.3 05/15/2024     (H) 05/15/2024       Lab Results   Component Value Date/Time     05/15/2024 05:18 AM    K 4.3 05/15/2024 05:18 AM     05/15/2024 05:18 AM    CO2 26 05/15/2024 05:18 AM    BUN 11 05/15/2024 05:18 AM    CREATININE 0.6 05/15/2024 05:18 AM

## 2024-05-15 NOTE — PLAN OF CARE
Problem: Discharge Planning  Goal: Discharge to home or other facility with appropriate resources  5/15/2024 1049 by Patience Howe, RN  Outcome: Progressing  5/15/2024 0100 by Isa Sahni RN  Outcome: Progressing     Problem: Safety - Adult  Goal: Free from fall injury  5/15/2024 1049 by Patience Howe, RN  Outcome: Progressing  5/15/2024 0100 by Isa Sahni RN  Outcome: Progressing     Problem: Skin/Tissue Integrity  Goal: Absence of new skin breakdown  Description: 1.  Monitor for areas of redness and/or skin breakdown  5/15/2024 0100 by Isa Sahni, RN  Outcome: Progressing

## 2024-05-15 NOTE — PATIENT CARE CONFERENCE
[facility]  Inpatient Rehabilitation  Weekly Team Conference Note    Date: 5/15/2024  Patient Name: Radha Malin        MRN: [id number]    : 1960  (63 y.o.)  Gender: female             Interventions to be utilized toward barriers to discharge, per discipline:  NURSING  Nursing observed barriers to dc: Pain, Anxiety, Decreased endurance, Upper extremity weakness, Lower extremity weakness, Medical complications, Wound Care, and Medication managment  Nursing interventions: medication management, assist with ADL's, safety education, educate on preventing skin breakdown/proper wound care, educate on chronic health conditions/issues   Family Education: No  Fall Risk:  Yes    Stay with me?: No    PHYSICAL THERAPY  Evaluation in progress.  Goals and POC to be established this date.         OCCUPATIONAL THERAPY  Evaluation in progress.  Goals and POC to be established this date.         SPEECH THERAPY (intentionally left blank if not actively being seen by this service):           NUTRITION  Weight - Scale: 64 kg (141 lb 1.5 oz) (from previous encounter) / Body mass index is 25.81 kg/m².        CASE MANAGEMENT  Assessment: 63 yr old female. Dx:Tibia/fibula fracture, shaft, left, closed, with routine healing, subsequent encounter. Independent plof rollator level. Lives with spouse in a 2 level home with level entry and walk-n-shower. Patient owns rollator, cane and shower chair.   Restrictions  Restrictions/Precautions  Restrictions/Precautions: Fall Risk;General Precautions;Weight Bearing  Lower Extremity Weight Bearing Restrictions  Left Lower Extremity Weight Bearing: Toe Touch Weight Bearing  Position Activity Restriction  Other position/activity restrictions: Ambulate pt, up as tolerated, Per orders: \"Touch down with minimal weight bearing on operative leg just for balance\"      Interdisciplinary Goals:   1.) Therapy evaluations in progress. Goals and POC to be established this date.

## 2024-05-15 NOTE — PROGRESS NOTES
Method: Automatic  Orthostatic B/P and Pulse?:  (postural hypotension)    Activity Tolerance  Activity Tolerance: Patient limited by pain;Patient limited by endurance    Assessment  Assessment: Pt presents s/p L tibia IMN (5/8/024) with post operative orders of TTWB on LLE. Pt PMHx includes breast reduction surgery on (5/6/24). Pt completes bed mobility with supervision and use of B HRs to assist, CGA-Min A up to RW for STS transfers, and CGA-Min A with RW for chair to bed transfers. Pt ambulates 8 ft this session with RW and CGA-Min A. Pt is limited in initial PT evaluation and treatment d/t increased pain. Pt given education over DVT prevention, importance of mobiilty, and role of PT.  Treatment Diagnosis: impaired functional mobility  Therapy Prognosis: Good  Decision Making: Medium Complexity  Discharge Recommendations: Continue to assess pending progress;Home with assist PRN  PT D/C Equipment  Walker: Rolling  Other: Pt states she already owns SP cane, 4WW, shower bench/chair  PT Equipment Recommendations  Equipment Needed: Yes  Mobility Devices: Walker  Walker: Rolling    Addendum Second Session  Assessment: Pt reports 7/10 pain in LLE upon arrival of treatment session. Pt seen this session for transfers and mobility. Pt completes sit to supine transfer with supervision, HOB flat, and use of B UE to help scoot. PT educated over importance of LLE mobility to assist in a quicker return to PLOF. Pt states she was previously using a SP cane in L UE before admitted/surgery to LLE. Pt completes supine bed exercises this date d/t increased pain with supervision and cues for proper performance of exercise.     Vitals: 123/76, 100% SPO2, 107 bpm     Bed mobility: supervision with sit to supine transfer, uses B UE to help scoot onto bed; increased time to complete.     Transfers: chair-bed transfer with RW, required CGA-Min A for sequencing transfer. Pt demos nervousness and mild impulsiveness to reach bed from  recliner.     Exercises: Exercise Treatment: Pt in supine and completes 2x10 of each on LLE (SLR, hip ABD slides, ankle pumps) with supervision.       GOALS  Patient Goals   Patient Goals : \"get the hell out of here\"  Short Term Goals  Time Frame for Short Term Goals: 5/23  Short Term Goal 1: Pt will complete bed mobility with Mod I and without use of B HR.  Short Term Goal 2: Pt will complete STS transfer with SBA and RW.  Short Term Goal 3: Pt will ambulate 25 ft with RW and CGA.  Short Term Goal 4: Pt will perform chair to bed transfers with RW and SBA.  Long Term Goals  Time Frame for Long Term Goals : 5/29  Long Term Goal 1: Pt will ambulate 50 feet or more with RW and Mod I.  Long Term Goal 2: Pt will complete HEP independently without cues.  Long Term Goal 3: Pt will perform car transfer with RW and Mod I.  Long Term Goal 4: Pt will perform chair to bed transfers with RW and Mod I.    PLAN OF CARE  Frequency: 1-2 treatment sessions per day, 5-7 days per week  Physical Therapy Plan  General Plan: 5-7 times per week  Current Treatment Recommendations: Strengthening;Balance training;Functional mobility training;Transfer training;Endurance training;Gait training;Home exercise program;Safety education & training;Patient/Caregiver education & training;Equipment evaluation, education, & procurement;Therapeutic activities;Co-Treatment  Safety Devices  Type of Devices: Call light within reach;All fall risk precautions in place;Gait belt;Patient at risk for falls;Left in bed;Bed alarm in place  Restraints  Restraints Initially in Place: No    EDUCATION  Education  Education Given To: Patient  Education Provided: Role of Therapy;Safety;Transfer Training;Plan of Care;Energy Conservation;Home Exercise Program;Fall Prevention Strategies;Mobility Training  Education Provided Comments: role of PT, safety and transfer sequencing with ARU equipment (RW), gait training with RW and maintain TTWB orders on LLE.  Education Method:  Demonstration;Verbal  Barriers to Learning: None  Education Outcome: Demonstrated understanding;Verbalized understanding      Therapy Time   Individual Concurrent Group Co-treatment   Time In 1000         Time Out 1100         Minutes 60           Second Session Therapy Time:   Individual Concurrent Group Co-treatment   Time In 1230         Time Out 1300         Minutes 30             Timed Code Treatment Minutes: 90 Minutes       BELLA Perez, 05/15/24 at 3:50 PM       This session was completed by the student physical therapist with supervision from Hillary Medina PT, DPT and documentation was reviewed.

## 2024-05-15 NOTE — CARE COORDINATION
Weekly team care conference with interdisciplinary team on:05/15/24     Chart reviewed for length of stay. IP day # 1  Unit: ARU   Diagnosis and current status as per MD progress: Tibia/fibula fracture, shaft, left, closed, with routine healing   Consultants Following: n/a  Planned Discharge Date: TBD  Durable medical equipment needed: TBD  Discharge Plan: TBD pending evaluations     63 yr old female. Dx:Tibia/fibula fracture, shaft, left, closed, with routine healing, subsequent encounter. Independent plof rollator level. Lives with spouse in a 2 level home with level entry and walk-n-shower. Patient owns rollator, cane and shower chair.     Restrictions  Restrictions/Precautions  Restrictions/Precautions: Fall Risk;General Precautions;Weight Bearing  Lower Extremity Weight Bearing Restrictions  Left Lower Extremity Weight Bearing: Toe Touch Weight Bearing  Position Activity Restriction  Other position/activity restrictions: Ambulate pt, up as tolerated, Per orders: \"Touch down with minimal weight bearing on operative leg just for balance\"    Patience Diallo RN

## 2024-05-15 NOTE — PROGRESS NOTES
Occupational Therapy  Facility/Department: [facility]  Rehabilitation Occupational Therapy Evaluation & treatment       Date: 5/15/24  Patient Name: Radha Malin       Room: 0170/0170-01  MRN: [id number]  Account: [id number]   : 1960  (63 y.o.) Gender: female     Referring Practitioner: Shannan Alan MD  Diagnosis: tibia/fibula fx       Restrictions  Restrictions/Precautions: Fall Risk;General Precautions;Weight Bearing;Surgical Protocols  Other position/activity restrictions: \"no heat or ice to breasts\" \"if no drains, then ok to shower on POD 2\", \"no heaby lifting > 10 lbs, no strenuous activities, no house chores, no overhead activities\" per Amanda Armendariz RN d/c instructions  Right Lower Extremity Weight Bearing: Weight Bearing As Tolerated  Left Lower Extremity Weight Bearing: Toe Touch Weight Bearing  Required Braces or Orthoses?: No    Subjective  Subjective: In bed and agreeable to eval. Pain: 4/10. /68, HR 88          Vitals  Temp: 97.8 °F (36.6 °C)  Pulse: 86  Respirations: 16  BP: (!) 101/53  Oxygen Therapy  SpO2: 95 %  O2 Device: None (Room air)  Level of Consciousness: Alert (0)    Objective     Cognition  Overall Cognitive Status: Exceptions  Arousal/Alertness: Delayed responses to stimuli  Following Commands: Follows one step commands with increased time  Attention Span: Attends with cues to redirect  Safety Judgement: Decreased awareness of need for assistance;Decreased awareness of need for safety  Problem Solving: Assistance required to generate solutions;Assistance required to implement solutions  Insights: Decreased awareness of deficits  Initiation: Requires cues for some  Sequencing: Requires cues for some  Orientation  Overall Orientation Status: Within Functional Limits  Orientation Level: Oriented X4       ROM  LUE AROM (degrees)  LUE AROM : WFL  Left Hand AROM (degrees)  Left Hand AROM: WFL  RUE AROM (degrees)  RUE AROM : WFL  Right Hand AROM (degrees)  Right Hand  AROM: WFL    Strength: UB strength NT d/t UB restrictions s/p breast reduction     Functional Mobility  Balance  Sitting Balance: Independent  Standing Balance: Contact guard assistance  Transfers  Stand Pivot Transfers: Moderate assistance;Maximum assistance;Minimal assistance (varies min-max A with RW; verbal cues for technique/safety and sequencing; EoB <> w/c, w/c <> toilet, w/c <> shower chair)  Sit to stand: Contact guard assistance  Stand to sit: Contact guard assistance  Toilet Transfers  Toilet - Technique: To left;To right  Equipment Used: Grab bars  Toilet Transfer: Moderate assistance  Toilet Transfers Comments: verbal cues for technique/safety and to maintain TTWB LLE  Shower Transfers  Shower - Transfer Type: To and From  Shower - Transfer To: Shower seat with back  Shower - Technique: Stand pivot  Shower Transfers: Moderate assistance;Maximal assistance  Shower Transfers Comments: mod A w/c > shower chair and max A shower chair > w/c. verbal cues for safety and technique      Social/Functional History  Lives With: Spouse  Type of Home: House  Home Layout: Two level;Performs ADL's on one level (lives on basement level, with level entry; kitchen upstairs)  Home Access: Level entry  Bathroom Shower/Tub: Walk-in shower;Shower chair with back  Bathroom Toilet: Standard  Bathroom Equipment: Shower chair  Home Equipment: Cane;Adjustable bed;Electric scooter;Rollator  Has the patient had two or more falls in the past year or any fall with injury in the past year?: Yes  Receives Help From: Family  ADL Assistance: Independent  Homemaking Assistance: Independent  Homemaking Responsibilities: Yes  Ambulation Assistance: Independent (cane)  Transfer Assistance: Independent  Active : Yes  Mode of Transportation: Car  Occupation: Retired  Type of Occupation: house keeping  Leisure & Hobbies: grand kids  Additional Comments: Sister can help PRN,  works full time in and out of home    ADL  Feeding:

## 2024-05-16 LAB
ANION GAP SERPL CALCULATED.3IONS-SCNC: 14 MMOL/L (ref 3–16)
BASOPHILS # BLD: 0 K/UL (ref 0–0.2)
BASOPHILS NFR BLD: 0 %
BUN SERPL-MCNC: 12 MG/DL (ref 7–20)
CALCIUM SERPL-MCNC: 9.6 MG/DL (ref 8.3–10.6)
CHLORIDE SERPL-SCNC: 105 MMOL/L (ref 99–110)
CO2 SERPL-SCNC: 19 MMOL/L (ref 21–32)
CREAT SERPL-MCNC: 0.6 MG/DL (ref 0.6–1.2)
DEPRECATED RDW RBC AUTO: 14.9 % (ref 12.4–15.4)
EOSINOPHIL # BLD: 0 K/UL (ref 0–0.6)
EOSINOPHIL NFR BLD: 0 %
GFR SERPLBLD CREATININE-BSD FMLA CKD-EPI: >90 ML/MIN/{1.73_M2}
GLUCOSE SERPL-MCNC: 83 MG/DL (ref 70–99)
HCT VFR BLD AUTO: 27.4 % (ref 36–48)
HGB BLD-MCNC: 8.3 G/DL (ref 12–16)
LYMPHOCYTES # BLD: 2.2 K/UL (ref 1–5.1)
LYMPHOCYTES NFR BLD: 25 %
MACROCYTES BLD QL SMEAR: ABNORMAL
MCH RBC QN AUTO: 27.8 PG (ref 26–34)
MCHC RBC AUTO-ENTMCNC: 30.3 G/DL (ref 31–36)
MCV RBC AUTO: 91.7 FL (ref 80–100)
MICROCYTES BLD QL SMEAR: ABNORMAL
MONOCYTES # BLD: 0.8 K/UL (ref 0–1.3)
MONOCYTES NFR BLD: 9 %
NEUTROPHILS # BLD: 5.8 K/UL (ref 1.7–7.7)
NEUTROPHILS NFR BLD: 59 %
NEUTS BAND NFR BLD MANUAL: 7 % (ref 0–7)
OVALOCYTES BLD QL SMEAR: ABNORMAL
PLATELET # BLD AUTO: 606 K/UL (ref 135–450)
PLATELET BLD QL SMEAR: ABNORMAL
PMV BLD AUTO: 6.8 FL (ref 5–10.5)
POLYCHROMASIA BLD QL SMEAR: ABNORMAL
POTASSIUM SERPL-SCNC: 4.4 MMOL/L (ref 3.5–5.1)
RBC # BLD AUTO: 2.99 M/UL (ref 4–5.2)
SLIDE REVIEW: ABNORMAL
SODIUM SERPL-SCNC: 138 MMOL/L (ref 136–145)
WBC # BLD AUTO: 8.8 K/UL (ref 4–11)

## 2024-05-16 PROCEDURE — 36415 COLL VENOUS BLD VENIPUNCTURE: CPT

## 2024-05-16 PROCEDURE — 97530 THERAPEUTIC ACTIVITIES: CPT

## 2024-05-16 PROCEDURE — 85025 COMPLETE CBC W/AUTO DIFF WBC: CPT

## 2024-05-16 PROCEDURE — 97110 THERAPEUTIC EXERCISES: CPT

## 2024-05-16 PROCEDURE — 6370000000 HC RX 637 (ALT 250 FOR IP): Performed by: STUDENT IN AN ORGANIZED HEALTH CARE EDUCATION/TRAINING PROGRAM

## 2024-05-16 PROCEDURE — 80048 BASIC METABOLIC PNL TOTAL CA: CPT

## 2024-05-16 PROCEDURE — 97542 WHEELCHAIR MNGMENT TRAINING: CPT

## 2024-05-16 PROCEDURE — 97535 SELF CARE MNGMENT TRAINING: CPT

## 2024-05-16 PROCEDURE — 94640 AIRWAY INHALATION TREATMENT: CPT

## 2024-05-16 PROCEDURE — 1280000000 HC REHAB R&B

## 2024-05-16 PROCEDURE — 6360000002 HC RX W HCPCS: Performed by: STUDENT IN AN ORGANIZED HEALTH CARE EDUCATION/TRAINING PROGRAM

## 2024-05-16 RX ADMIN — ASPIRIN 81 MG: 81 TABLET, COATED ORAL at 08:33

## 2024-05-16 RX ADMIN — TRAZODONE HYDROCHLORIDE 500 MG: 50 TABLET ORAL at 21:34

## 2024-05-16 RX ADMIN — MIDODRINE HYDROCHLORIDE 5 MG: 5 TABLET ORAL at 08:32

## 2024-05-16 RX ADMIN — Medication 5 MG: at 21:05

## 2024-05-16 RX ADMIN — MORPHINE SULFATE 15 MG: 15 TABLET, FILM COATED, EXTENDED RELEASE ORAL at 21:05

## 2024-05-16 RX ADMIN — TIZANIDINE 4 MG: 4 TABLET ORAL at 16:00

## 2024-05-16 RX ADMIN — HYDROCODONE BITARTRATE AND ACETAMINOPHEN 2 TABLET: 5; 325 TABLET ORAL at 05:29

## 2024-05-16 RX ADMIN — MIDODRINE HYDROCHLORIDE 5 MG: 5 TABLET ORAL at 16:00

## 2024-05-16 RX ADMIN — ENOXAPARIN SODIUM 40 MG: 100 INJECTION SUBCUTANEOUS at 08:33

## 2024-05-16 RX ADMIN — PANTOPRAZOLE SODIUM 40 MG: 40 TABLET, DELAYED RELEASE ORAL at 05:29

## 2024-05-16 RX ADMIN — MORPHINE SULFATE 15 MG: 15 TABLET, FILM COATED, EXTENDED RELEASE ORAL at 08:33

## 2024-05-16 RX ADMIN — TROSPIUM CHLORIDE 20 MG: 20 TABLET, FILM COATED ORAL at 05:29

## 2024-05-16 RX ADMIN — TROSPIUM CHLORIDE 20 MG: 20 TABLET, FILM COATED ORAL at 16:00

## 2024-05-16 RX ADMIN — AMITRIPTYLINE HYDROCHLORIDE 25 MG: 25 TABLET, FILM COATED ORAL at 21:05

## 2024-05-16 RX ADMIN — POLYETHYLENE GLYCOL 3350 17 G: 17 POWDER, FOR SOLUTION ORAL at 08:33

## 2024-05-16 RX ADMIN — MIDODRINE HYDROCHLORIDE 5 MG: 5 TABLET ORAL at 11:53

## 2024-05-16 RX ADMIN — TIZANIDINE 4 MG: 4 TABLET ORAL at 21:34

## 2024-05-16 RX ADMIN — Medication 2 PUFF: at 16:10

## 2024-05-16 RX ADMIN — HYDROCODONE BITARTRATE AND ACETAMINOPHEN 2 TABLET: 5; 325 TABLET ORAL at 13:30

## 2024-05-16 ASSESSMENT — PAIN SCALES - GENERAL
PAINLEVEL_OUTOF10: 0
PAINLEVEL_OUTOF10: 5
PAINLEVEL_OUTOF10: 5
PAINLEVEL_OUTOF10: 6
PAINLEVEL_OUTOF10: 8
PAINLEVEL_OUTOF10: 2
PAINLEVEL_OUTOF10: 4
PAINLEVEL_OUTOF10: 6
PAINLEVEL_OUTOF10: 4
PAINLEVEL_OUTOF10: 3

## 2024-05-16 ASSESSMENT — PAIN - FUNCTIONAL ASSESSMENT
PAIN_FUNCTIONAL_ASSESSMENT: PREVENTS OR INTERFERES SOME ACTIVE ACTIVITIES AND ADLS
PAIN_FUNCTIONAL_ASSESSMENT: ACTIVITIES ARE NOT PREVENTED

## 2024-05-16 ASSESSMENT — PAIN DESCRIPTION - ORIENTATION
ORIENTATION: RIGHT
ORIENTATION: LEFT

## 2024-05-16 ASSESSMENT — PAIN DESCRIPTION - DESCRIPTORS
DESCRIPTORS: THROBBING
DESCRIPTORS: ACHING

## 2024-05-16 ASSESSMENT — PAIN DESCRIPTION - ONSET: ONSET: ON-GOING

## 2024-05-16 ASSESSMENT — PAIN DESCRIPTION - PAIN TYPE: TYPE: CHRONIC PAIN;ACUTE PAIN

## 2024-05-16 ASSESSMENT — PAIN DESCRIPTION - LOCATION
LOCATION: LEG;NECK
LOCATION: LEG

## 2024-05-16 ASSESSMENT — PAIN DESCRIPTION - FREQUENCY: FREQUENCY: CONTINUOUS

## 2024-05-16 NOTE — PLAN OF CARE
Problem: Discharge Planning  Goal: Discharge to home or other facility with appropriate resources  5/16/2024 1120 by Yessy Nguyễn RN  Outcome: Progressing  5/16/2024 1120 by Yessy Nguyễn RN  Outcome: Progressing  Flowsheets (Taken 5/16/2024 0810)  Discharge to home or other facility with appropriate resources: Identify barriers to discharge with patient and caregiver  5/15/2024 2239 by Isa Sahni RN  Outcome: Progressing  Flowsheets (Taken 5/15/2024 1420 by Patience Howe, RN)  Discharge to home or other facility with appropriate resources: Identify barriers to discharge with patient and caregiver     Problem: Safety - Adult  Goal: Free from fall injury  5/16/2024 1120 by Yessy Nguyễn RN  Outcome: Progressing  5/16/2024 1120 by Yessy Nguyễn RN  Outcome: Progressing  5/15/2024 2239 by Isa Sahni RN  Outcome: Progressing     Problem: Pain  Goal: Verbalizes/displays adequate comfort level or baseline comfort level  5/16/2024 1120 by Yessy Nguyễn RN  Outcome: Progressing  5/16/2024 1120 by Yessy Nguyễn RN  Outcome: Progressing  5/15/2024 2239 by Isa Sahni RN  Outcome: Progressing     Problem: Skin/Tissue Integrity  Goal: Absence of new skin breakdown  Description: 1.  Monitor for areas of redness and/or skin breakdown  2.  Assess vascular access sites hourly  3.  Every 4-6 hours minimum:  Change oxygen saturation probe site  4.  Every 4-6 hours:  If on nasal continuous positive airway pressure, respiratory therapy assess nares and determine need for appliance change or resting period.  5/16/2024 1120 by Yessy Nguyễn RN  Outcome: Progressing  5/16/2024 1120 by Yessy Nguyễn RN  Outcome: Progressing  5/15/2024 2239 by Isa Sahni RN  Outcome: Progressing     Problem: ABCDS Injury Assessment  Goal: Absence of physical injury  5/15/2024 2239 by Isa Sahni RN  Outcome: Progressing

## 2024-05-16 NOTE — PLAN OF CARE
Problem: Safety - Adult  Goal: Free from fall injury  5/15/2024 2239 by Isa Sahni, RN  Outcome: Progressing  5/15/2024 1049 by Patience Howe, RN  Outcome: Progressing     Problem: Pain  Goal: Verbalizes/displays adequate comfort level or baseline comfort level  Outcome: Progressing

## 2024-05-16 NOTE — PROGRESS NOTES
Radha Malin  5/16/2024  [id number]    Chief Complaint: Tibia/fibula fracture, shaft, left, closed, with routine healing, subsequent encounter    Subjective:   Seen in her room this morning.  No new complaints overnight.  She continues to have left lower extremity pain but this does improve a little with ice.  She continues to work hard in therapy.  She was able to sleep well last night.  She is hoping to go home to her family sometime next week.  ROS: denies f/c, n/v, cp, sob    Objective:  Patient Vitals for the past 24 hrs:   BP Temp Temp src Pulse Resp SpO2   05/16/24 1145 108/65 -- -- -- -- --   05/16/24 0810 117/81 97.5 °F (36.4 °C) Oral 87 17 97 %   05/16/24 0559 -- -- -- -- 16 --   05/16/24 0529 -- -- -- -- 16 --   05/15/24 2008 -- -- -- -- 16 --   05/15/24 1938 113/78 99 °F (37.2 °C) Oral 98 16 93 %   05/15/24 1730 107/70 -- -- -- -- --       Gen: No distress, pleasant.   HEENT: Normocephalic, atraumatic.   CV: extremities well perfused   Resp: No respiratory distress.   Abd: Soft, nontender  Ext: No edema.   Neuro: Alert, oriented, appropriately interactive.     Wt Readings from Last 3 Encounters:   05/14/24 64 kg (141 lb 1.5 oz)   05/07/24 64 kg (141 lb)   06/12/23 62.6 kg (138 lb)       Laboratory data:   Lab Results   Component Value Date    WBC 8.8 05/16/2024    HGB 8.3 (L) 05/16/2024    HCT 27.4 (L) 05/16/2024    MCV 91.7 05/16/2024     (H) 05/16/2024       Lab Results   Component Value Date/Time     05/16/2024 05:19 AM    K 4.4 05/16/2024 05:19 AM     05/16/2024 05:19 AM    CO2 19 05/16/2024 05:19 AM    BUN 12 05/16/2024 05:19 AM    CREATININE 0.6 05/16/2024 05:19 AM    GLUCOSE 83 05/16/2024 05:19 AM    CALCIUM 9.6 05/16/2024 05:19 AM        Therapy progress:  Physical therapy:  Bed Mobility:     Sit>supine:     Supine>sit:     Transfers:     Sit>stand:  Assistance Level: Minimal assistance  Skilled Clinical Factors: to STEDY  Stand>sit:  Assistance Level: Minimal  assistance  Bed<>chair  Assistance Level: Dependent  Skilled Clinical Factors: via STEDY. pt educated on TTWBing precautions and increased WBing through RLE , pt with decreased adherence to TTWB LLE 2* STEDY/fatigue. educated to attempt to use RW for pivot transfers in future for safety/precaution adherence  Stand Pivot:     Lateral transfer:     Car transfer:     Ambulation:     Stairs:     Curb:     Wheelchair:       Occupational therapy:   Feeding     Grooming/Oral Hygiene  Assistance Level: Stand by assist  Skilled Clinical Factors: standing at sink to brush teeth, leaning on sink for support  UE Bathing     LE Bathing     UE Dressing     LE Dressing     Putting On/Taking Off Footwear  Assistance Level: Set-up  Skilled Clinical Factors: doffing and donning R shoe  Toileting       Speech therapy:    ADULT DIET; Regular  ADULT ORAL NUTRITION SUPPLEMENT; Breakfast, Lunch, Dinner; Standard High Calorie/High Protein Oral Supplement    Body mass index is 25.81 kg/m².    Assessment and Plan:  Radha Malin is a 63 year old female with a past medical history significant for chronic pain, postural hypotension, asthma, and recent breast reduction surgery who presented to [facility] on 5/7/24 with left lower leg pain after a fall at home, found to have a left tibia-fibula fracture now s/p IM nailing. She was admitted to [facility] on 5/14/24 due to functional deficits below her baseline.      Left Tibia-Fibula Fracture  - s/p IM nail 5/8  - toe touch weight bearing LLE, ROM as tolerated  - daily dressing changes  - PT, OT     Acute on Chronic Pain  - on MS contin and norco  mg TID PRN at home  - continue MS Contin 15 mg BID and Norco 5-325 mg q6 hours PRN     Sleep disturbance  - melatonin and trazodone qhs     Acute blood loss anemia  - monitor and transfuse for Hb<7     Recent Breast Reduction Surgery  - incision care     Postural hypotension  - midodrine     Bowels: adjust medications as needed for regular bowel

## 2024-05-16 NOTE — CONSULTS
Comprehensive Nutrition Assessment    Type and Reason for Visit:  Initial, Consult    Nutrition Recommendations/Plan:   Continue regular diet  Encourage po intakes  Ensure BID - pt prefers vanilla  Monitor po intakes, nutrition adequacy, weights, pertinent labs, BMs     Malnutrition Assessment:  Malnutrition Status:  At risk for malnutrition (Comment) (05/16/24 4704)      Nutrition Assessment:    Consult for oral nutrition supplements. New ARU pt. Pt initially presented to [facility] on 5/7/24 with left lower leg pain after a fall at home, found to have a left tibia-fibula fracture now s/p IM nailing. Currently on a regular diet with Ensure TID. Pt reports that her appetite is fair, but she does not like the food offered. Pt states that her family has been bringing in some food for her. Encouraged po intakes. Pt reports that she has been drinking some of her Ensure. Will make note of flavor preferences in [facility]ORD. Encouraged po intakes. Pt had no questions. Will monitor.    Nutrition Related Findings:    Last BM 5/14. +1 LLE edema. Labs reviewed. Wound Type: Surgical Incision       Current Nutrition Intake & Therapies:    Average Meal Intake: 0%, 1-25%, 26-50%, 51-75%  Average Supplements Intake: Unable to assess  ADULT DIET; Regular  ADULT ORAL NUTRITION SUPPLEMENT; Breakfast, Lunch, Dinner; Standard High Calorie/High Protein Oral Supplement    Anthropometric Measures:  Height: 157.5 cm (5' 2\")  Ideal Body Weight (IBW): 110 lbs (50 kg)       Current Body Weight: 64 kg (141 lb 1.5 oz),   IBW. Weight Source: Not Specified  Current BMI (kg/m2): 25.8                          BMI Categories: Overweight (BMI 25.0-29.9)    Estimated Daily Nutrient Needs:  Energy Requirements Based On: Kcal/kg  Weight Used for Energy Requirements: Current  Energy (kcal/day): 5898-3071  Weight Used for Protein Requirements: Current  Protein (g/day): 64-77  Method Used for Fluid Requirements: 1 ml/kcal  Fluid (ml/day):  7799-8191    Nutrition Diagnosis:   Inadequate protein-energy intake related to inadequate protein-energy intake as evidenced by poor intake prior to admission    Nutrition Interventions:   Food and/or Nutrient Delivery: Continue Current Diet, Continue Oral Nutrition Supplement  Nutrition Education/Counseling: No recommendation at this time, Education declined  Coordination of Nutrition Care: Continue to monitor while inpatient, Interdisciplinary Rounds       Goals:     Goals: PO intake 50% or greater, prior to discharge       Nutrition Monitoring and Evaluation:   Behavioral-Environmental Outcomes: None Identified  Food/Nutrient Intake Outcomes: Food and Nutrient Intake, Supplement Intake  Physical Signs/Symptoms Outcomes: Weight    Discharge Planning:    Continue current diet, Continue Oral Nutrition Supplement     Adrianna Rebollar RD, LD  Contact: [phone]

## 2024-05-16 NOTE — PROGRESS NOTES
precautions and increased WBing through RLE , pt with decreased adherence to TTWB LLE 2* STEDY/fatigue. educated to attempt to use RW for pivot transfers in future for safety/precaution adherence         PT Exercises  Exercise Treatment: pt completed 20 reps of BLE supine ankle pumps, heel slides, hip abduction, SLR    Pt in bed with breakfast and call light/needs within reach and alarm donned.   ASSESSMENT/PROGRESS TOWARDS GOALS  Vital Signs  Pulse: (!) 101  Heart Rate Source: Monitor  BP: 117/81  BP Location: Left upper arm  MAP (Calculated): 93  SpO2: 97 %  O2 Device: None (Room air)    Assessment  Assessment: pt found on commode with STEDY positioned and pt requesting help to return to bed. pt politely declines SPT with RW to assist with maintaining TTWB LLE, TD via STEDy to bed with max VC for R LE WBing. pt provided with supine HEP to complete per pt request. continue to assess d/c plan. DME: RW, potential need for manual w/c  Activity Tolerance: Patient limited by pain;Patient limited by endurance  Discharge Recommendations: Continue to assess pending progress;Home with assist PRN  PT Equipment Recommendations  Equipment Needed: Yes  Other: Pt states she already owns SP cane, 4WW, shower bench/chair    Addendum: 2nd session (Shu Rob PT, DPT)  Pt seen in am for second session. Pt agreeable, reports aching pain in LLE but does not provide a pain rating. Session with focus on standing balance and w/c mobility over various surfaces including outdoors. Pt requires Sup for bed mobility, Lulú overall functional t/f and standing balance with cues to maintain WB restrictions. Pt requires SBA for w/c mobility.   Bed mobility:  Supine to sit SUP with HOB elevated and use of BR  Transfers:  SPT EOB to w/c without AD Lulú  SPT w/c to/from commode with grab bar Lulú  STS w/c to RW Lulú completed multiple reps  Pt requires cues for sequence and safety and frequent cues to maintain LLE WB restrictions  Balance:  X 2  bouts static standing with RW with x 12 reps bean bag toss to target with CGA to Lulú for balance, cues to maintain WB restrictions.  Completed to improve dynamic balance with UE release to improve safety and IND with mobility  Therapeutic exercise:  Seated TE:   BLE AP 2 x 15   B LAQ 2 x 15   B marches 2 x 15   B hip abduction 2 x 15   BUE bicep curl to shoulder press 3lb weight (within px) x 15  Cues for sequence/techniques, completed with 2lb weight to RLE. Completed to improve strength and increase safety and IND with mobility.  W/c mobility:  Pt completes w/c mobility x 100' + 1000' with BUE with SBA, pt completes multiple turns, navigates through doorways and over threshold and completes over outdoor surfaces to promote safety and IND within the home and community.\    Pt seated in recliner at end of session with chair alarm in place and all needs within reach.    Goals  Patient Goals   Patient Goals : \"get the hell out of here\"  Short Term Goals  Time Frame for Short Term Goals: 5/23  Short Term Goal 1: Pt will complete bed mobility with Mod I and without use of B HR.  Short Term Goal 2: Pt will complete STS transfer with SBA and RW.  Short Term Goal 3: Pt will ambulate 25 ft with RW and CGA.  Short Term Goal 4: Pt will perform chair to bed transfers with RW and SBA.  Long Term Goals  Time Frame for Long Term Goals : 5/29  Long Term Goal 1: Pt will ambulate 50 feet or more with RW and Mod I.  Long Term Goal 2: Pt will complete HEP independently without cues.  Long Term Goal 3: Pt will perform car transfer with RW and Mod I.  Long Term Goal 4: Pt will perform chair to bed transfers with RW and Mod I.    PLAN OF CARE/SAFETY  Physical Therapy Plan  General Plan: 5-7 times per week  Current Treatment Recommendations: Strengthening;Balance training;Functional mobility training;Transfer training;Endurance training;Gait training;Home exercise program;Safety education & training;Patient/Caregiver education &  training;Equipment evaluation, education, & procurement;Therapeutic activities;Co-Treatment  Safety Devices  Type of Devices: Call light within reach;All fall risk precautions in place;Gait belt;Patient at risk for falls;Left in bed;Bed alarm in place    EDUCATION  Education  Education Given To: Patient  Education Provided: Role of Therapy;Safety;Transfer Training;Plan of Care;Energy Conservation;Home Exercise Program;Fall Prevention Strategies;Mobility Training  Education Provided Comments: role of PT, safety and transfer sequencing, performance of ADLs, TTWB during ADLs  Education Method: Demonstration;Verbal  Barriers to Learning: None  Education Outcome: Demonstrated understanding;Verbalized understanding;Continued education needed        Therapy Time   Individual Concurrent Group Co-treatment   Time In 0800         Time Out 0830         Minutes 30           Timed Code Treatment Minutes: 30 Minutes       Berenice Chan PT, 05/16/24 at 8:17 AM       Second Session Therapy Time: (Shu Rob PT, DPT)   Individual Concurrent Group Co-treatment   Time In 1030         Time Out 1130         Minutes 60           Timed Code Treatment Minutes:  60 minutes    Total Treatment Minutes:  90 minutes    Shu Rob PT, DPT

## 2024-05-16 NOTE — PLAN OF CARE
Problem: Discharge Planning  Goal: Discharge to home or other facility with appropriate resources  5/16/2024 1120 by Yessy Nguyễn RN  Outcome: Progressing  Flowsheets (Taken 5/16/2024 0810)  Discharge to home or other facility with appropriate resources: Identify barriers to discharge with patient and caregiver  5/15/2024 2239 by Isa Sahni RN  Outcome: Progressing  Flowsheets (Taken 5/15/2024 1420 by Patience Howe, RN)  Discharge to home or other facility with appropriate resources: Identify barriers to discharge with patient and caregiver     Problem: Safety - Adult  Goal: Free from fall injury  5/16/2024 1120 by Yessy Nguyễn RN  Outcome: Progressing  5/15/2024 2239 by Isa Sahni RN  Outcome: Progressing     Problem: Pain  Goal: Verbalizes/displays adequate comfort level or baseline comfort level  5/16/2024 1120 by Yessy Nguyễn RN  Outcome: Progressing  5/15/2024 2239 by Isa Sahni RN  Outcome: Progressing     Problem: Skin/Tissue Integrity  Goal: Absence of new skin breakdown  Description: 1.  Monitor for areas of redness and/or skin breakdown  2.  Assess vascular access sites hourly  3.  Every 4-6 hours minimum:  Change oxygen saturation probe site  4.  Every 4-6 hours:  If on nasal continuous positive airway pressure, respiratory therapy assess nares and determine need for appliance change or resting period.  5/16/2024 1120 by Yessy Nguyễn RN  Outcome: Progressing  5/15/2024 2239 by Isa Sahni RN  Outcome: Progressing     Problem: ABCDS Injury Assessment  Goal: Absence of physical injury  5/15/2024 2239 by Isa Sahni RN  Outcome: Progressing

## 2024-05-16 NOTE — PROGRESS NOTES
Occupational Therapy  Facility/Department: [facility]  Rehabilitation Occupational Therapy Daily Treatment Note    Date: 24  Patient Name: Radha Malin       Room: 0170/0170-01  MRN: [id number]  Account: [id number]   : 1960  (63 y.o.) Gender: female                    Past Medical History:  has a past medical history of ADHD (attention deficit hyperactivity disorder), Arthritis, Asthma, Cancer (HCC), History of blood transfusion, Postural hypotension, Recurrent sinus infections, Wears glasses, and Wears partial dentures.  Past Surgical History:   has a past surgical history that includes Dental surgery; Knee arthroscopy; Carpal tunnel release; Nose surgery (Left); Tubal ligation; Ankle fracture surgery (Right, 2014); fracture surgery (Left); Lung removal, partial (Right); Ankle surgery (Right, 2023); and Tibia fracture surgery (Left, 2024).    Restrictions  Restrictions/Precautions: Fall Risk, General Precautions, Weight Bearing, Surgical Protocols  Other position/activity restrictions: \"no heat or ice to breasts\" \"if no drains, then ok to shower on POD 2\", \"no heaby lifting > 10 lbs, no strenuous activities, no house chores, no overhead activities\" per Amanda Armendariz RN d/c instructions. OK for ankle and knee range of motion as tolerated. Encourage motion. Toe touch weightbearing for balance only.  Right Lower Extremity Weight Bearing: Weight Bearing As Tolerated  Left Lower Extremity Weight Bearing: Toe Touch Weight Bearing  Required Braces or Orthoses?: No    Subjective  Subjective: Pt in bed at OT arrival. BP 99/64, HR 96. Does not rate pain  Restrictions/Precautions: Fall Risk;General Precautions;Weight Bearing;Surgical Protocols             Objective     Cognition  Overall Cognitive Status: Exceptions  Arousal/Alertness: Delayed responses to stimuli  Following Commands: Follows one step commands with increased time  Attention Span: Attends with cues to redirect  Safety  Pt will perform LB bathing with CGA  Long Term Goals  Time Frame for Long Term Goals : 5/29  Long Term Goal 1: Pt will perform LB dressing mod I with AE PRN  Long Term Goal 2: Pt will perform LB bathing mod I  Long Term Goal 3: Pt will perform toilet transfer mod I  Long Term Goal 4: Pt will perform toileting task mod I  Long Term Goal 5: Pt will doff/don footwear mod I with AE PRN    Therapy Time   Individual Concurrent Group Co-treatment   Time In 0900         Time Out 0930         Minutes 30         Timed Code Treatment Minutes: 30 Minutes     Second Session Therapy Time:   Individual Concurrent Group Co-treatment   Time In 1330         Time Out 1430         Minutes 60           Timed Code Treatment Minutes:  60 Minutes    Total Treatment Minutes:   90    Albert Daniel, OT

## 2024-05-17 PROCEDURE — 1280000000 HC REHAB R&B

## 2024-05-17 PROCEDURE — 92523 SPEECH SOUND LANG COMPREHEN: CPT

## 2024-05-17 PROCEDURE — 6370000000 HC RX 637 (ALT 250 FOR IP): Performed by: STUDENT IN AN ORGANIZED HEALTH CARE EDUCATION/TRAINING PROGRAM

## 2024-05-17 PROCEDURE — 97110 THERAPEUTIC EXERCISES: CPT

## 2024-05-17 PROCEDURE — 6360000002 HC RX W HCPCS: Performed by: STUDENT IN AN ORGANIZED HEALTH CARE EDUCATION/TRAINING PROGRAM

## 2024-05-17 PROCEDURE — 97530 THERAPEUTIC ACTIVITIES: CPT

## 2024-05-17 PROCEDURE — 97535 SELF CARE MNGMENT TRAINING: CPT

## 2024-05-17 RX ADMIN — MORPHINE SULFATE 15 MG: 15 TABLET, FILM COATED, EXTENDED RELEASE ORAL at 21:10

## 2024-05-17 RX ADMIN — HYDROCODONE BITARTRATE AND ACETAMINOPHEN 2 TABLET: 5; 325 TABLET ORAL at 15:33

## 2024-05-17 RX ADMIN — DIPHENHYDRAMINE HYDROCHLORIDE 25 MG: 25 TABLET ORAL at 22:23

## 2024-05-17 RX ADMIN — AMITRIPTYLINE HYDROCHLORIDE 25 MG: 25 TABLET, FILM COATED ORAL at 21:10

## 2024-05-17 RX ADMIN — ENOXAPARIN SODIUM 40 MG: 100 INJECTION SUBCUTANEOUS at 08:33

## 2024-05-17 RX ADMIN — Medication 5 MG: at 21:10

## 2024-05-17 RX ADMIN — PANTOPRAZOLE SODIUM 40 MG: 40 TABLET, DELAYED RELEASE ORAL at 06:01

## 2024-05-17 RX ADMIN — TROSPIUM CHLORIDE 20 MG: 20 TABLET, FILM COATED ORAL at 06:01

## 2024-05-17 RX ADMIN — MORPHINE SULFATE 15 MG: 15 TABLET, FILM COATED, EXTENDED RELEASE ORAL at 08:33

## 2024-05-17 RX ADMIN — MIDODRINE HYDROCHLORIDE 5 MG: 5 TABLET ORAL at 17:05

## 2024-05-17 RX ADMIN — ASPIRIN 81 MG: 81 TABLET, COATED ORAL at 08:33

## 2024-05-17 RX ADMIN — HYDROCODONE BITARTRATE AND ACETAMINOPHEN 2 TABLET: 5; 325 TABLET ORAL at 02:03

## 2024-05-17 RX ADMIN — TROSPIUM CHLORIDE 20 MG: 20 TABLET, FILM COATED ORAL at 15:33

## 2024-05-17 RX ADMIN — TRAZODONE HYDROCHLORIDE 500 MG: 50 TABLET ORAL at 21:10

## 2024-05-17 RX ADMIN — MIDODRINE HYDROCHLORIDE 5 MG: 5 TABLET ORAL at 11:52

## 2024-05-17 RX ADMIN — HYDROCODONE BITARTRATE AND ACETAMINOPHEN 2 TABLET: 5; 325 TABLET ORAL at 08:33

## 2024-05-17 RX ADMIN — MIDODRINE HYDROCHLORIDE 5 MG: 5 TABLET ORAL at 08:33

## 2024-05-17 ASSESSMENT — PAIN - FUNCTIONAL ASSESSMENT
PAIN_FUNCTIONAL_ASSESSMENT: ACTIVITIES ARE NOT PREVENTED
PAIN_FUNCTIONAL_ASSESSMENT: PREVENTS OR INTERFERES SOME ACTIVE ACTIVITIES AND ADLS

## 2024-05-17 ASSESSMENT — PAIN DESCRIPTION - ONSET: ONSET: ON-GOING

## 2024-05-17 ASSESSMENT — PAIN SCALES - WONG BAKER: WONGBAKER_NUMERICALRESPONSE: NO HURT

## 2024-05-17 ASSESSMENT — PAIN SCALES - GENERAL
PAINLEVEL_OUTOF10: 5
PAINLEVEL_OUTOF10: 8
PAINLEVEL_OUTOF10: 3
PAINLEVEL_OUTOF10: 7
PAINLEVEL_OUTOF10: 7
PAINLEVEL_OUTOF10: 6

## 2024-05-17 ASSESSMENT — PAIN DESCRIPTION - ORIENTATION
ORIENTATION: LEFT

## 2024-05-17 ASSESSMENT — PAIN DESCRIPTION - DESCRIPTORS
DESCRIPTORS: ACHING;DISCOMFORT
DESCRIPTORS: ACHING;STABBING
DESCRIPTORS: SHARP

## 2024-05-17 ASSESSMENT — PAIN DESCRIPTION - LOCATION
LOCATION: LEG
LOCATION: BUTTOCKS
LOCATION: LEG
LOCATION: LEG

## 2024-05-17 ASSESSMENT — PAIN DESCRIPTION - FREQUENCY: FREQUENCY: CONTINUOUS

## 2024-05-17 ASSESSMENT — PAIN DESCRIPTION - PAIN TYPE: TYPE: ACUTE PAIN

## 2024-05-17 NOTE — PLAN OF CARE
Cognitive-linguistic evaluation completed.    Ivis Boss M.A. CCC-SLP  Speech Language Pathologist

## 2024-05-17 NOTE — PROGRESS NOTES
Speech Language Pathology  Facility/Department: [facility]  Initial Speech/Language/Cognitive Assessment       NAME:Radha Malin  : 1960 (63 y.o.)   MRN: [id number]  ROOM: 0170/0170-01  ADMISSION DATE: 2024  PATIENT DIAGNOSIS(ES): Tibia/fibula fracture, shaft, left, closed, with routine healing, subsequent encounter [S82.202D, S82.402D]  Patient Active Problem List    Diagnosis Date Noted    Tibia/fibula fracture, shaft, left, closed, with routine healing, subsequent encounter 2024    Closed displaced spiral fracture of shaft of left tibia 2024    Fall at home, initial encounter 2024    Chronic pain 2024    Painful orthopaedic hardware (HCC) 2023    Orthostatic hypotension 2020    Tobacco use disorder 2020    Community acquired bacterial pneumonia 2020    Reactive airway disease 2020    History of lung cancer 2020    Chronic pain of right lower extremity 2020    Arthritis of ankle joint 2016    Pilon fracture 2014     Past Medical History:   Diagnosis Date    ADHD (attention deficit hyperactivity disorder)     Arthritis     Asthma     Cancer (HCC)     lung    History of blood transfusion     Postural hypotension     Recurrent sinus infections     Wears glasses     Wears partial dentures     implants on bottom to hold partial / partial on top also     Past Surgical History:   Procedure Laterality Date    ANKLE FRACTURE SURGERY Right 2014    ANKLE SURGERY Right 2023    RIGHT FIBULA HARDWARE (PLATE) REMOVAL AND PRODENSE INSERTION performed by Clarence Salazar MD at [facility] OR    CARPAL TUNNEL RELEASE      right    DENTAL SURGERY      implants    FRACTURE SURGERY Left     foot X 2    KNEE ARTHROSCOPY      left    LUNG REMOVAL, PARTIAL Right     NOSE SURGERY Left     left nasal valve reconstruction-total of 3 surgeries    TIBIA FRACTURE SURGERY Left 2024    TIBIA INTRAMEDULLARY NAIL INSERTION performed by Vasile Antunez  MD LAURA at [facility] OR    TUBAL LIGATION       No Known Allergies    Date of Evaluation: 5/17/2024   Evaluating Therapist: CT HOLDEN    Subjective:   Chart Reviewed: : [x] Yes [] No    Onset Date: 5/07/24    Recent Results of CT of Head / MRI: none    Medical record review/interview: Per MD H&P on 5/14: \"\"Radha Malin is a 63 year old female with a past medical history significant for chronic pain, postural hypotension, asthma, and recent breast reduction surgery who presented to [facility] on 5/7/24 with left lower leg pain after a fall at home. She was found to have a left tibia-fibula fracture. Orthopedic Surgery was consulted and on 5/8 she underwent left segmental tibia shaft intramedullary nailing. Course notable for acute blood loss anemia. She was admitted to [facility] on 5/14/24 due to functional deficits below her baseline. Today Radha reports some continued ankle pain. Reviewed her home medication regimen. She otherwise reports feeling well and is happy to be in rehab. \"\"    Behavior / Cognition: alert, cooperative, and pleasant mood    Pt's goals: \"to get better and walk\"    Pain: The patient does not complain of pain    Dysphagia screen:  Pt denies any swallowing concerns. Pt currently tolerating regular/thin diet. Full evaluation not warranted.    Vision / Hearing:  Vision: Wears glasses all the time  Hearing: WFL    Previous Level of Function:  Living Status: partner  Occupation: Retired  Homemaking Responsibilities:   Meal Prep Responsibility: No  Laundry Responsibility: Primary  Cleaning Responsibility: Shared  Bill Paying / Finance Responsibility: Shared  Shopping Responsibility: Shared  Health Care Management: Shared  Active : yes  Leisure and Hobbies: shop, lunch w/ sisters      Assessment:  Communication Diagnosis: mild cognitive deficit      Impressions:   Pt alert and cooperative, agreeable to eval. Pt lives with partner. Pt independent with ADLs, is an active . Pt reports her

## 2024-05-17 NOTE — PLAN OF CARE
Problem: Discharge Planning  Goal: Discharge to home or other facility with appropriate resources  5/17/2024 0038 by Frank Palomo RN  Outcome: Progressing  5/16/2024 1120 by Yessy Nguyễn RN  Outcome: Progressing  5/16/2024 1120 by Yessy Nguyễn RN  Outcome: Progressing  Flowsheets (Taken 5/16/2024 0810)  Discharge to home or other facility with appropriate resources: Identify barriers to discharge with patient and caregiver     Problem: Safety - Adult  Goal: Free from fall injury  5/17/2024 0038 by Frank Palomo RN  Outcome: Progressing  5/16/2024 1120 by Yessy Nguyễn RN  Outcome: Progressing  5/16/2024 1120 by Yessy Nguyễn RN  Outcome: Progressing     Problem: Pain  Goal: Verbalizes/displays adequate comfort level or baseline comfort level  5/17/2024 0038 by Frank Palomo RN  Outcome: Progressing  5/16/2024 1120 by Yessy Nguyễn RN  Outcome: Progressing  5/16/2024 1120 by Yessy Nguyễn RN  Outcome: Progressing     Problem: Skin/Tissue Integrity  Goal: Absence of new skin breakdown  Description: 1.  Monitor for areas of redness and/or skin breakdown  2.  Assess vascular access sites hourly  3.  Every 4-6 hours minimum:  Change oxygen saturation probe site  4.  Every 4-6 hours:  If on nasal continuous positive airway pressure, respiratory therapy assess nares and determine need for appliance change or resting period.  5/17/2024 0038 by Frank Palomo RN  Outcome: Progressing  5/16/2024 1120 by Yessy Nguyễn RN  Outcome: Progressing  5/16/2024 1120 by Yessy Nguyễn RN  Outcome: Progressing     Problem: Musculoskeletal - Adult  Goal: Maintain proper alignment of affected body part  Outcome: Progressing     Problem: Skin/Tissue Integrity - Adult  Goal: Skin integrity remains intact  Outcome: Progressing  Flowsheets (Taken 5/16/2024 1110 by Yessy Nguyễn RN)  Skin Integrity Remains Intact: Monitor for areas of redness and/or skin breakdown     Problem: Nutrition Deficit:  Goal: Optimize nutritional

## 2024-05-17 NOTE — CARE COORDINATION
Plan DC TBD. Recs for 24 Hr and poss HHC. From home w spouse and IPTA w AD. CM following to coordinate dispo needs. Anya Mota RN

## 2024-05-17 NOTE — PROGRESS NOTES
Physical Therapy  Facility/Department: [facility]  Rehabilitation Physical Therapy Treatment Note    NAME: Radha Malin  : 1960 (63 y.o.)  MRN: [id number]  CODE STATUS: Full Code    Date of Service: 24       Restrictions:  Restrictions/Precautions: Fall Risk, General Precautions, Weight Bearing, Surgical Protocols  Lower Extremity Weight Bearing Restrictions  Right Lower Extremity Weight Bearing: Weight Bearing As Tolerated  Left Lower Extremity Weight Bearing: Toe Touch Weight Bearing  Position Activity Restriction  Sternal Precautions: 10# Lifting Restrictions  Other position/activity restrictions: \"no heat or ice to breasts\" \"if no drains, then ok to shower on POD 2\", \"no heaby lifting > 10 lbs, no strenuous activities, no house chores, no overhead activities\" per Amanda Armendariz RN d/c instructions. OK for ankle and knee range of motion as tolerated. Encourage motion. Toe touch weightbearing for balance only.     SUBJECTIVE  Subjective  Subjective: Pt in bed on arrival, appears motivated for PT.           OBJECTIVE  Cognition  Overall Cognitive Status: Exceptions  Arousal/Alertness: Appropriate responses to stimuli  Following Commands: Follows one step commands consistently  Attention Span: Attends with cues to redirect  Memory: Decreased short term memory;Decreased recall of precautions  Safety Judgement: Decreased awareness of need for assistance;Decreased awareness of need for safety  Problem Solving: Assistance required to generate solutions;Assistance required to implement solutions  Insights: Decreased awareness of deficits  Initiation: Requires cues for some  Sequencing: Requires cues for some (safe/slow movements, proper hand placement)  Orientation  Overall Orientation Status: Within Functional Limits  Orientation Level: Oriented X4    Functional Mobility  Bed Mobility  Overall Assistance Level: Independent (sit <> long sit position, MOD I)  Roll Left  Assistance Level: Independent  Roll  with RW today. Recommend IP PT to address mobility deficits prior to safe d/c home. DME TBD with progress.  Activity Tolerance: Patient limited by pain;Patient limited by endurance;Patient tolerated treatment well  Discharge Recommendations: Continue to assess pending progress;Home with assist PRN  PT Equipment Recommendations  Equipment Needed: Yes  Walker: Rolling  Other: Pt states she already owns SP cane, 4WW, shower bench/chair; continue to assess need for w/c.    Goals  Patient Goals   Patient Goals : \"I want to get to where I can move again\"  Short Term Goals  Time Frame for Short Term Goals: 5/23  Short Term Goal 1: Pt will complete bed mobility with Mod I and without use of B HR.  Short Term Goal 2: Pt will complete STS transfer with SBA and RW.  Short Term Goal 3: Pt will ambulate 25 ft with RW and CGA.  Short Term Goal 4: Pt will perform chair to bed transfers with RW and SBA.  Long Term Goals  Time Frame for Long Term Goals : 5/29  Long Term Goal 1: Pt will ambulate 50 feet or more with RW and Mod I.  Long Term Goal 2: Pt will complete HEP independently without cues.  Long Term Goal 3: Pt will perform car transfer with RW and Mod I.  Long Term Goal 4: Pt will perform chair to bed transfers with RW and Mod I.    PLAN OF CARE/SAFETY  Physical Therapy Plan  General Plan: 5-7 times per week  Current Treatment Recommendations: Strengthening;Balance training;Functional mobility training;Transfer training;Endurance training;Gait training;Home exercise program;Safety education & training;Patient/Caregiver education & training;Equipment evaluation, education, & procurement;Therapeutic activities;Co-Treatment  Safety Devices  Type of Devices: Call light within reach;All fall risk precautions in place;Gait belt;Patient at risk for falls;Left in bed;Bed alarm in place    EDUCATION  Education  Education Given To: Patient  Education Provided: Role of Therapy;Safety;Transfer Training;Plan of Care;Energy

## 2024-05-17 NOTE — PROGRESS NOTES
Occupational Therapy  Facility/Department: [facility]  Rehabilitation Occupational Therapy Daily Treatment Note    Date: 24  Patient Name: Radha Malin       Room: 0170/0170-01  MRN: [id number]  Account: [id number]   : 1960  (63 y.o.) Gender: female                    Past Medical History:  has a past medical history of ADHD (attention deficit hyperactivity disorder), Arthritis, Asthma, Cancer (HCC), History of blood transfusion, Postural hypotension, Recurrent sinus infections, Wears glasses, and Wears partial dentures.  Past Surgical History:   has a past surgical history that includes Dental surgery; Knee arthroscopy; Carpal tunnel release; Nose surgery (Left); Tubal ligation; Ankle fracture surgery (Right, 2014); fracture surgery (Left); Lung removal, partial (Right); Ankle surgery (Right, 2023); and Tibia fracture surgery (Left, 2024).    Restrictions  Restrictions/Precautions: Fall Risk, General Precautions, Weight Bearing, Surgical Protocols  Other position/activity restrictions: \"no heat or ice to breasts\" \"if no drains, then ok to shower on POD 2\", \"no heaby lifting > 10 lbs, no strenuous activities, no house chores, no overhead activities\" per Amanda Armendariz RN d/c instructions. OK for ankle and knee range of motion as tolerated. Encourage motion. Toe touch weightbearing for balance only.  Right Lower Extremity Weight Bearing: Weight Bearing As Tolerated  Left Lower Extremity Weight Bearing: Toe Touch Weight Bearing  Required Braces or Orthoses?: No    Subjective  Subjective: Pt in bed at OT arrival. Requesting to change pajamas and for a bed linen change. Pt reports pain in LLE, does not formally rate but does report it throbs when not propped. Vitals WNL  Restrictions/Precautions: Fall Risk;General Precautions;Weight Bearing;Surgical Protocols             Objective     Cognition  Overall Cognitive Status: Exceptions  Arousal/Alertness: Appropriate responses to  Strengthening;Balance training;Functional mobility training;Endurance training;Self-Care / ADL;Patient/Caregiver education & training;Safety education & training;Pain management;Positioning    Goals  Patient Goals   Patient goals : \"to get up and walking\"  Short Term Goals  Time Frame for Short Term Goals: 5/22  Short Term Goal 1: Pt will perform LB dressing with mod A and AE PRN - goal met 5/17  Short Term Goal 2: Pt will perform toilet transfer with CGA  Short Term Goal 3: Pt will perform footwear with mod A and AE PRN- goal met 5/17  Short Term Goal 4: Pt will perform LB bathing with CGA  Long Term Goals  Time Frame for Long Term Goals : 5/29  Long Term Goal 1: Pt will perform LB dressing mod I with AE PRN  Long Term Goal 2: Pt will perform LB bathing mod I  Long Term Goal 3: Pt will perform toilet transfer mod I  Long Term Goal 4: Pt will perform toileting task mod I  Long Term Goal 5: Pt will doff/don footwear mod I with AE PRN      Therapy Time   Individual Concurrent Group Co-treatment   Time In 1100         Time Out 1130         Minutes 30         Timed Code Treatment Minutes: 30 Minutes     Second Session Therapy Time:   Individual Concurrent Group Co-treatment   Time In 1230         Time Out 1330         Minutes 60           Timed Code Treatment Minutes:  60 Minutes    Total Treatment Minutes:   90    Albert Daniel, OT

## 2024-05-17 NOTE — PROGRESS NOTES
ADDENDUM TO ARU PATIENT TREATMENT PLAN  [facility]    Patient Name: Radha Malin        MRN: [id number]    : 1960  (63 y.o.)  Date: 2024     The [facility] Patient Treatment Plan for Radha Malin has been updated as of 2024 to reflect the following changes:  Physical Therapy: 90 minutes, Decreasing to 60 minutes 5 days/week due to speech adding to caseload.   Occupational Therapy: 90 minutes, Decreasing to 60 minutes 5 days/week due to speech adding to caseload  Speech Therapy: Add 60 minutes 5 days/week due to cognitive deficits    Patient, MD, and treatment team all aware and in agreement.      Signature:  Ivis Boss M.A. CCC-SLP  Speech Language Pathologist  Cassia Palomo, PT, DPT  Albert Daniel OTR/CORNELIUS EverettPTRUE  PM&R  2024  4:03 PM

## 2024-05-17 NOTE — PLAN OF CARE
Problem: Safety - Adult  Goal: Free from fall injury  5/17/2024 1101 by Joanne Watts RN  Outcome: Progressing  5/17/2024 0038 by Frank Palomo RN  Outcome: Progressing     Problem: Pain  Goal: Verbalizes/displays adequate comfort level or baseline comfort level  5/17/2024 1101 by Joanne Watts RN  Outcome: Progressing  5/17/2024 0038 by Frank Palomo RN  Outcome: Progressing

## 2024-05-17 NOTE — PROGRESS NOTES
Radha Malin  5/17/2024  [id number]    Chief Complaint: Tibia/fibula fracture, shaft, left, closed, with routine healing, subsequent encounter    Subjective:   Seen in her room participating in therapy this morning.  She continues to have left lower leg pain but is able to continue with therapy during her pain episodes.  She is using ice and pain medication to control her outbreaks.  She slept well last night and is agreeable to continue more therapy next week.    ROS: denies f/c, n/v, cp, sob    Objective:  Patient Vitals for the past 24 hrs:   BP Temp Temp src Pulse Resp SpO2   05/17/24 0829 100/68 97.7 °F (36.5 °C) Oral 92 18 93 %   05/17/24 0233 -- -- -- -- 18 --   05/17/24 0203 -- -- -- -- 18 --   05/16/24 2135 -- -- -- -- 18 --   05/16/24 2045 112/73 97.8 °F (36.6 °C) Oral 97 18 99 %   05/16/24 1145 108/65 -- -- -- -- --       Gen: No distress, pleasant.   HEENT: Normocephalic, atraumatic.   CV: extremities well perfused   Resp: No respiratory distress.   Abd: Soft, nontender  Ext: No edema.   Neuro: Alert, oriented, appropriately interactive.     Wt Readings from Last 3 Encounters:   05/14/24 64 kg (141 lb 1.5 oz)   05/07/24 64 kg (141 lb)   06/12/23 62.6 kg (138 lb)       Laboratory data:   Lab Results   Component Value Date    WBC 8.8 05/16/2024    HGB 8.3 (L) 05/16/2024    HCT 27.4 (L) 05/16/2024    MCV 91.7 05/16/2024     (H) 05/16/2024       Lab Results   Component Value Date/Time     05/16/2024 05:19 AM    K 4.4 05/16/2024 05:19 AM     05/16/2024 05:19 AM    CO2 19 05/16/2024 05:19 AM    BUN 12 05/16/2024 05:19 AM    CREATININE 0.6 05/16/2024 05:19 AM    GLUCOSE 83 05/16/2024 05:19 AM    CALCIUM 9.6 05/16/2024 05:19 AM        Therapy progress:  Physical therapy:  Bed Mobility:     Sit>supine:     Supine>sit:     Transfers:     Sit>stand:  Assistance Level: Minimal assistance  Skilled Clinical Factors: to STEDY  Stand>sit:  Assistance Level: Minimal assistance  Bed<>chair  Assistance  Level: Dependent  Skilled Clinical Factors: via STEDY. pt educated on TTWBing precautions and increased WBing through RLE , pt with decreased adherence to TTWB LLE 2* STEDY/fatigue. educated to attempt to use RW for pivot transfers in future for safety/precaution adherence  Stand Pivot:     Lateral transfer:     Car transfer:     Ambulation:     Stairs:     Curb:     Wheelchair:       Occupational therapy:   Feeding     Grooming/Oral Hygiene  Assistance Level: Stand by assist  Skilled Clinical Factors: standing at sink to brush teeth, leaning on sink for support  UE Bathing     LE Bathing     UE Dressing     LE Dressing     Putting On/Taking Off Footwear  Assistance Level: Set-up  Skilled Clinical Factors: doffing and donning R shoe  Toileting       Speech therapy:    ADULT DIET; Regular  ADULT ORAL NUTRITION SUPPLEMENT; Breakfast, Dinner; Standard High Calorie/High Protein Oral Supplement    Body mass index is 25.81 kg/m².    Assessment and Plan:  Radha Malin is a 63 year old female with a past medical history significant for chronic pain, postural hypotension, asthma, and recent breast reduction surgery who presented to [facility] on 5/7/24 with left lower leg pain after a fall at home, found to have a left tibia-fibula fracture now s/p IM nailing. She was admitted to [facility] on 5/14/24 due to functional deficits below her baseline.      Left Tibia-Fibula Fracture  - s/p IM nail 5/8  - toe touch weight bearing LLE, ROM as tolerated  - daily dressing changes  - PT, OT     Acute on Chronic Pain  - on MS contin and norco  mg TID PRN at home  - continue MS Contin 15 mg BID and Norco 5-325 mg q6 hours PRN     Sleep disturbance  - melatonin and trazodone qhs     Acute blood loss anemia  - monitor and transfuse for Hb<7     Recent Breast Reduction Surgery  - incision care     Postural hypotension  - midodrine     Bowels: adjust medications as needed for regular bowel movements     Bladder: Check PVR x 3.  IMC if

## 2024-05-18 PROCEDURE — 6360000002 HC RX W HCPCS: Performed by: STUDENT IN AN ORGANIZED HEALTH CARE EDUCATION/TRAINING PROGRAM

## 2024-05-18 PROCEDURE — 94640 AIRWAY INHALATION TREATMENT: CPT

## 2024-05-18 PROCEDURE — 97530 THERAPEUTIC ACTIVITIES: CPT

## 2024-05-18 PROCEDURE — 97110 THERAPEUTIC EXERCISES: CPT

## 2024-05-18 PROCEDURE — 97129 THER IVNTJ 1ST 15 MIN: CPT

## 2024-05-18 PROCEDURE — 97130 THER IVNTJ EA ADDL 15 MIN: CPT

## 2024-05-18 PROCEDURE — 1280000000 HC REHAB R&B

## 2024-05-18 PROCEDURE — 6370000000 HC RX 637 (ALT 250 FOR IP): Performed by: STUDENT IN AN ORGANIZED HEALTH CARE EDUCATION/TRAINING PROGRAM

## 2024-05-18 PROCEDURE — 97535 SELF CARE MNGMENT TRAINING: CPT

## 2024-05-18 RX ADMIN — Medication 2 PUFF: at 12:03

## 2024-05-18 RX ADMIN — Medication 5 MG: at 21:01

## 2024-05-18 RX ADMIN — MORPHINE SULFATE 15 MG: 15 TABLET, FILM COATED, EXTENDED RELEASE ORAL at 08:58

## 2024-05-18 RX ADMIN — TRAZODONE HYDROCHLORIDE 500 MG: 50 TABLET ORAL at 21:01

## 2024-05-18 RX ADMIN — POLYETHYLENE GLYCOL 3350 17 G: 17 POWDER, FOR SOLUTION ORAL at 08:19

## 2024-05-18 RX ADMIN — MIDODRINE HYDROCHLORIDE 5 MG: 5 TABLET ORAL at 08:19

## 2024-05-18 RX ADMIN — PANTOPRAZOLE SODIUM 40 MG: 40 TABLET, DELAYED RELEASE ORAL at 06:09

## 2024-05-18 RX ADMIN — HYDROCODONE BITARTRATE AND ACETAMINOPHEN 2 TABLET: 5; 325 TABLET ORAL at 08:19

## 2024-05-18 RX ADMIN — ASPIRIN 81 MG: 81 TABLET, COATED ORAL at 08:19

## 2024-05-18 RX ADMIN — MIDODRINE HYDROCHLORIDE 5 MG: 5 TABLET ORAL at 12:22

## 2024-05-18 RX ADMIN — TROSPIUM CHLORIDE 20 MG: 20 TABLET, FILM COATED ORAL at 06:09

## 2024-05-18 RX ADMIN — AMITRIPTYLINE HYDROCHLORIDE 25 MG: 25 TABLET, FILM COATED ORAL at 21:01

## 2024-05-18 RX ADMIN — MIDODRINE HYDROCHLORIDE 5 MG: 5 TABLET ORAL at 17:22

## 2024-05-18 RX ADMIN — HYDROCODONE BITARTRATE AND ACETAMINOPHEN 2 TABLET: 5; 325 TABLET ORAL at 14:27

## 2024-05-18 RX ADMIN — TIZANIDINE 4 MG: 4 TABLET ORAL at 21:01

## 2024-05-18 RX ADMIN — ENOXAPARIN SODIUM 40 MG: 100 INJECTION SUBCUTANEOUS at 08:19

## 2024-05-18 RX ADMIN — HYDROCODONE BITARTRATE AND ACETAMINOPHEN 2 TABLET: 5; 325 TABLET ORAL at 00:09

## 2024-05-18 RX ADMIN — MORPHINE SULFATE 15 MG: 15 TABLET, FILM COATED, EXTENDED RELEASE ORAL at 21:01

## 2024-05-18 RX ADMIN — TIZANIDINE 4 MG: 4 TABLET ORAL at 14:27

## 2024-05-18 RX ADMIN — HYDROCODONE BITARTRATE AND ACETAMINOPHEN 2 TABLET: 5; 325 TABLET ORAL at 21:00

## 2024-05-18 RX ADMIN — DIPHENHYDRAMINE HYDROCHLORIDE 25 MG: 25 TABLET ORAL at 21:01

## 2024-05-18 RX ADMIN — TROSPIUM CHLORIDE 20 MG: 20 TABLET, FILM COATED ORAL at 17:22

## 2024-05-18 ASSESSMENT — PAIN SCALES - GENERAL
PAINLEVEL_OUTOF10: 7
PAINLEVEL_OUTOF10: 7
PAINLEVEL_OUTOF10: 3
PAINLEVEL_OUTOF10: 7
PAINLEVEL_OUTOF10: 7

## 2024-05-18 ASSESSMENT — PAIN SCALES - WONG BAKER: WONGBAKER_NUMERICALRESPONSE: NO HURT

## 2024-05-18 ASSESSMENT — PAIN DESCRIPTION - LOCATION
LOCATION: BUTTOCKS
LOCATION: BUTTOCKS

## 2024-05-18 NOTE — PROGRESS NOTES
hygiene and standing for pants management  Toilet Transfers  Technique: Stand pivot  Equipment: Grab bars;Standard toilet  Additional Factors: Verbal cues;Cues for hand placement;Increased time to complete  Assistance Level: Contact guard assist    Instrumental ADL's  Instrumental ADLs: Yes  Light Housekeeping  Light Housekeeping Level: Wheelchair  Light Housekeeping Level of Assistance: Minimal assistance  Light Housekeeping: Pt propels w/c throughout therapy gym to collect x 8 shirts 1 at a time, places into simulated washer, switches to simulated dryer, then folds in seated. Pt requires few instances of min A to prevent from bumping extended LLE (on footplate) into \"washer\" during task. Pt then places clothing in cabinet. Pt removes hanging clothes from hangers then rehangs on clothing rack.     Functional Mobility  Device: Wheelchair  Activity: To/From therapy gym  Assistance Level: Stand by assist;Minimal assistance  Skilled Clinical Factors: up to min A during IADL task, SBA for w/c mob to and from therapy gym  Bed Mobility  Overall Assistance Level: Modified Independent  Sit to Supine  Assistance Level: Modified independent  Supine to Sit  Assistance Level: Modified independent  Scooting  Assistance Level: Modified independent  Transfers  Surface: To bed;From bed;Wheelchair  Additional Factors: Verbal cues;Hand placement cues;Increased time to complete  Device: Walker  Sit to Stand  Assistance Level: Stand by assist  Stand to Sit  Assistance Level: Stand by assist  Bed To/From Chair  Assistance Level: Stand by assist  Skilled Clinical Factors: RW, EoB <> w/c     OT Exercises  Resistive Exercises: 4# dumbbell, x 20 reps: bicep curls, chest press, IR/ER, shoulder flexion to 90*, hammer curls, supination/pronation, shoulder abd with elbow flexed     Assessment  Assessment  Assessment: Pt tolerates session well. Pt performs IADL task with up to min A. Pt requires assist to slow w/c to prevent bumping LLE (on  extended leg rest) into simulated washer. Pt misses 2 shirts when collecting clothing. Pt performs BUE ex to incr strength for ADLs and fxl transfers. Pt completes stand pivot transfers with SBA (with RW to/from EOB; grab bars to/from toilet) and toileting with CGA. Cont OT POC   Activity Tolerance: Patient limited by pain;Patient limited by endurance;Patient tolerated treatment well  Discharge Recommendations: Home with Home health OT;24 hour supervision or assist  OT Equipment Recommendations  Equipment Needed: Yes  Mobility Devices: ADL Assistive Devices  ADL Assistive Devices: Toileting - 3-in-1 Commode;Grab Bars - toilet;Grab Bars - shower;Sock-Aid Hard  Safety Devices  Safety Devices in place: Yes  Type of devices: Left in bed;Bed alarm in place;Nurse notified;Gait belt;Patient at risk for falls;All fall risk precautions in place    Patient Education  Education  Education Given To: Patient  Education Provided: Role of Therapy;Home Exercise Program;Energy Conservation;ADL Function;Fall Prevention Strategies;Equipment  Education Provided Comments: restrictions s/p breast reduction when performing IADLs, role of OT, POC, safety with transfers, technique for transfers, hand placement, performance of ADLs, pacing activities for safety  Education Method: Verbal  Education Outcome: Continued education needed    Plan  Occupational Therapy Plan  Times Per Week: 5-7/week  Current Treatment Recommendations: Strengthening;Balance training;Functional mobility training;Endurance training;Self-Care / ADL;Patient/Caregiver education & training;Safety education & training;Pain management;Positioning    Goals  Patient Goals   Patient goals : \"to get up and walking\"  Short Term Goals  Time Frame for Short Term Goals: 5/22- ongoing 5/18  Short Term Goal 1: Pt will perform LB dressing with mod A and AE PRN - goal met 5/17  Short Term Goal 2: Pt will perform toilet transfer with CGA  Short Term Goal 3: Pt will perform footwear with  mod A and AE PRN- goal met 5/17  Short Term Goal 4: Pt will perform LB bathing with CGA  Long Term Goals  Time Frame for Long Term Goals : 5/29  Long Term Goal 1: Pt will perform LB dressing mod I with AE PRN  Long Term Goal 2: Pt will perform LB bathing mod I  Long Term Goal 3: Pt will perform toilet transfer mod I  Long Term Goal 4: Pt will perform toileting task mod I  Long Term Goal 5: Pt will doff/don footwear mod I with AE PRN      Therapy Time   Individual Concurrent Group Co-treatment   Time In 0930         Time Out 1030         Minutes 60         Timed Code Treatment Minutes: 60 Minutes       Albert Daniel, OT

## 2024-05-18 NOTE — PROGRESS NOTES
Physical Therapy  Facility/Department: [facility]  Rehabilitation Physical Therapy Treatment Note    NAME: Radha Malin  : 1960 (63 y.o.)  MRN: [id number]  CODE STATUS: Full Code    Date of Service: 24       Restrictions:  Restrictions/Precautions: Fall Risk, General Precautions, Weight Bearing, Surgical Protocols  Lower Extremity Weight Bearing Restrictions  Right Lower Extremity Weight Bearing: Weight Bearing As Tolerated  Left Lower Extremity Weight Bearing: Toe Touch Weight Bearing  Position Activity Restriction  Sternal Precautions: 10# Lifting Restrictions  Other position/activity restrictions: \"no heat or ice to breasts\" \"if no drains, then ok to shower on POD 2\", \"no heavy lifting > 10 lbs, no strenuous activities, no house chores, no overhead activities\" per Amanda Armendariz RN d/c instructions. OK for ankle and knee range of motion as tolerated. Encourage motion. Toe touch weightbearing for balance only.     SUBJECTIVE  Subjective  Subjective: Pt resting in bed upon arrival and agreeable to PT session. Reports increased fatigue  Pain: Pt reports 5/10 pain in LLE            OBJECTIVE  Orientation  Overall Orientation Status: Within Functional Limits  Orientation Level: Oriented X4    Functional Mobility  Sit to Supine  Assistance Level: Modified independent  Supine to Sit  Assistance Level: Modified independent  Scooting  Assistance Level: Independent  Balance  Sitting Balance: Supervision  Standing Balance: Contact guard assistance (using RW)  Transfers  Surface: From bed;Wheelchair;Standard toilet;From chair with arms  Additional Factors: Verbal cues  Device: Walker (RW)  Sit to Stand  Assistance Level: Stand by assist  Skilled Clinical Factors: up to RW; adheres to TTWB LLE without cues  Stand to Sit  Assistance Level: Stand by assist  Skilled Clinical Factors: adheres to TTWB LLE without cues; min cues for hand placement, L foot positioning, and to reach back for armrests prior to  Therapy Plan  General Plan: 5-7 times per week  Current Treatment Recommendations: Strengthening;Balance training;Functional mobility training;Transfer training;Endurance training;Gait training;Home exercise program;Safety education & training;Patient/Caregiver education & training;Equipment evaluation, education, & procurement;Therapeutic activities;Co-Treatment  Safety Devices  Type of Devices: All fall risk precautions in place;Call light within reach;Bed alarm in place;Patient at risk for falls;Left in bed;Nurse notified    EDUCATION  Education  Education Given To: Patient  Education Provided: Role of Therapy;Safety;Transfer Training;Plan of Care;Energy Conservation;Home Exercise Program;Fall Prevention Strategies;Mobility Training  Education Provided Comments: role of PT, safety, transfers, progression of mobility  Education Method: Demonstration;Verbal  Barriers to Learning: None  Education Outcome: Demonstrated understanding;Verbalized understanding;Continued education needed        Therapy Time   Individual Concurrent Group Co-treatment   Time In 1315         Time Out 1415         Minutes 60           Timed Code Treatment Minutes: 60 Minutes       Roseanne Santillan PT, DPT 05/18/24 at 2:32 PM

## 2024-05-18 NOTE — PROGRESS NOTES
[facility]: ACUTE REHAB UNIT  SPEECH-LANGUAGE PATHOLOGY      [x] Daily  [] Weekly Care Conference Note  [] Discharge    Patient:Radha Malin      :1960  MRN:[id number]  Rehab Dx/Hx: Tibia/fibula fracture, shaft, left, closed, with routine healing, subsequent encounter [S82.202D, S82.402D]    Precautions: falls  Home situation: lives with partner  ST Dx: [] Aphasia  [] Dysarthria  [] Apraxia   [] Oropharyngeal dysphagia [x] Cognitive Impairment  [] Other:   Date of Admit: 2024  Room #: 0170/0170-01    Current functional status (updated daily):         Pt being seen for : [] Speech/Language Treatment  [] Dysphagia Treatment [x] Cognitive Treatment  [] Other:  Communication: [x]WFL  [] Aphasia  [] Dysarthria  [] Apraxia  [] Pragmatic Impairment [] Non-verbal  [] Hearing Loss  [] Other:   Cognition: [] WFL  [x] Mild  [] Moderate  [] Severe [] Unable to Assess  [] Other:  Memory: [x] WFL  [] Mild  [] Moderate  [] Severe [] Unable to Assess  [] Other:  Behavior: [x] Alert  [x] Cooperative  [x]  Pleasant  [] Confused  [] Agitated  [] Uncooperative  [] Distractible [] Motivated  [] Self-Limiting [x] Anxious  [] Other:  Endurance:  [x] Adequate for participation in SLP sessions  [] Reduced overall  [] Lethargic  [] Other:  Safety: [] No concerns at this time  [x] Reduced insight into deficits  []  Reduced safety awareness [] Not following call light procedures  [] Unable to Assess  [] Other:    Current Diet Order:ADULT DIET; Regular  ADULT ORAL NUTRITION SUPPLEMENT; Breakfast, Dinner; Standard High Calorie/High Protein Oral Supplement  Swallowing Precautions: Sit up for all meals and thereafter for 30 minutes        Date: 2024      Tx session 1  7612-2423 All Needs Met in Tx Session 1   Total Timed Code Min 60    Total Treatment Minutes 60    Individual Treatment Minutes 60    Group Treatment Minutes 0 0   Co-Treat Minutes 0 0   Variance/Reason:  N/a    Pain Pain in leg 6/10    Pain Intervention [x] RN

## 2024-05-18 NOTE — PLAN OF CARE
Problem: Safety - Adult  Goal: Free from fall injury  5/18/2024 1803 by Rachel Cedeño, RN  Outcome: Progressing  Flowsheets (Taken 5/18/2024 1803)  Free From Fall Injury:   Instruct family/caregiver on patient safety   Based on caregiver fall risk screen, instruct family/caregiver to ask for assistance with transferring infant if caregiver noted to have fall risk factors  5/18/2024 0635 by Deepa Plummer RN  Outcome: Progressing     Problem: Pain  Goal: Verbalizes/displays adequate comfort level or baseline comfort level  Outcome: Progressing  Flowsheets (Taken 5/18/2024 1803)  Verbalizes/displays adequate comfort level or baseline comfort level:   Encourage patient to monitor pain and request assistance   Administer analgesics based on type and severity of pain and evaluate response   Consider cultural and social influences on pain and pain management   Assess pain using appropriate pain scale   Implement non-pharmacological measures as appropriate and evaluate response   Notify Licensed Independent Practitioner if interventions unsuccessful or patient reports new pain

## 2024-05-19 VITALS
BODY MASS INDEX: 25.96 KG/M2 | TEMPERATURE: 97.7 F | SYSTOLIC BLOOD PRESSURE: 127 MMHG | HEART RATE: 84 BPM | DIASTOLIC BLOOD PRESSURE: 87 MMHG | HEIGHT: 62 IN | RESPIRATION RATE: 16 BRPM | OXYGEN SATURATION: 97 % | WEIGHT: 141.09 LBS

## 2024-05-19 PROCEDURE — 1280000000 HC REHAB R&B

## 2024-05-19 PROCEDURE — 6360000002 HC RX W HCPCS: Performed by: STUDENT IN AN ORGANIZED HEALTH CARE EDUCATION/TRAINING PROGRAM

## 2024-05-19 PROCEDURE — 6370000000 HC RX 637 (ALT 250 FOR IP): Performed by: STUDENT IN AN ORGANIZED HEALTH CARE EDUCATION/TRAINING PROGRAM

## 2024-05-19 RX ADMIN — MIDODRINE HYDROCHLORIDE 5 MG: 5 TABLET ORAL at 16:37

## 2024-05-19 RX ADMIN — ENOXAPARIN SODIUM 40 MG: 100 INJECTION SUBCUTANEOUS at 08:05

## 2024-05-19 RX ADMIN — PANTOPRAZOLE SODIUM 40 MG: 40 TABLET, DELAYED RELEASE ORAL at 06:04

## 2024-05-19 RX ADMIN — DIPHENHYDRAMINE HYDROCHLORIDE 25 MG: 25 TABLET ORAL at 21:13

## 2024-05-19 RX ADMIN — TROSPIUM CHLORIDE 20 MG: 20 TABLET, FILM COATED ORAL at 06:04

## 2024-05-19 RX ADMIN — TRAZODONE HYDROCHLORIDE 500 MG: 50 TABLET ORAL at 21:13

## 2024-05-19 RX ADMIN — Medication 5 MG: at 21:13

## 2024-05-19 RX ADMIN — MIDODRINE HYDROCHLORIDE 5 MG: 5 TABLET ORAL at 08:05

## 2024-05-19 RX ADMIN — HYDROCODONE BITARTRATE AND ACETAMINOPHEN 2 TABLET: 5; 325 TABLET ORAL at 16:39

## 2024-05-19 RX ADMIN — ASPIRIN 81 MG: 81 TABLET, COATED ORAL at 08:05

## 2024-05-19 RX ADMIN — TROSPIUM CHLORIDE 20 MG: 20 TABLET, FILM COATED ORAL at 16:37

## 2024-05-19 RX ADMIN — TIZANIDINE 4 MG: 4 TABLET ORAL at 21:13

## 2024-05-19 RX ADMIN — MORPHINE SULFATE 15 MG: 15 TABLET, FILM COATED, EXTENDED RELEASE ORAL at 08:05

## 2024-05-19 RX ADMIN — HYDROCODONE BITARTRATE AND ACETAMINOPHEN 2 TABLET: 5; 325 TABLET ORAL at 09:46

## 2024-05-19 RX ADMIN — MORPHINE SULFATE 15 MG: 15 TABLET, FILM COATED, EXTENDED RELEASE ORAL at 21:13

## 2024-05-19 RX ADMIN — POLYETHYLENE GLYCOL 3350 17 G: 17 POWDER, FOR SOLUTION ORAL at 08:05

## 2024-05-19 RX ADMIN — MIDODRINE HYDROCHLORIDE 5 MG: 5 TABLET ORAL at 12:37

## 2024-05-19 RX ADMIN — TIZANIDINE 4 MG: 4 TABLET ORAL at 09:46

## 2024-05-19 RX ADMIN — HYDROCODONE BITARTRATE AND ACETAMINOPHEN 2 TABLET: 5; 325 TABLET ORAL at 21:13

## 2024-05-19 RX ADMIN — AMITRIPTYLINE HYDROCHLORIDE 25 MG: 25 TABLET, FILM COATED ORAL at 21:12

## 2024-05-19 ASSESSMENT — PAIN DESCRIPTION - FREQUENCY: FREQUENCY: INTERMITTENT

## 2024-05-19 ASSESSMENT — PAIN DESCRIPTION - PAIN TYPE: TYPE: SURGICAL PAIN

## 2024-05-19 ASSESSMENT — PAIN SCALES - GENERAL
PAINLEVEL_OUTOF10: 0
PAINLEVEL_OUTOF10: 7
PAINLEVEL_OUTOF10: 0

## 2024-05-19 ASSESSMENT — PAIN DESCRIPTION - ORIENTATION
ORIENTATION: LEFT
ORIENTATION: LEFT

## 2024-05-19 ASSESSMENT — PAIN - FUNCTIONAL ASSESSMENT: PAIN_FUNCTIONAL_ASSESSMENT: ACTIVITIES ARE NOT PREVENTED

## 2024-05-19 ASSESSMENT — PAIN DESCRIPTION - LOCATION
LOCATION: LEG
LOCATION: LEG

## 2024-05-19 ASSESSMENT — PAIN DESCRIPTION - DESCRIPTORS: DESCRIPTORS: ACHING;DISCOMFORT

## 2024-05-19 ASSESSMENT — PAIN DESCRIPTION - ONSET: ONSET: GRADUAL

## 2024-05-20 LAB
ANION GAP SERPL CALCULATED.3IONS-SCNC: 12 MMOL/L (ref 3–16)
BASOPHILS # BLD: 0.1 K/UL (ref 0–0.2)
BASOPHILS NFR BLD: 0.8 %
BUN SERPL-MCNC: 16 MG/DL (ref 7–20)
CALCIUM SERPL-MCNC: 9.5 MG/DL (ref 8.3–10.6)
CHLORIDE SERPL-SCNC: 103 MMOL/L (ref 99–110)
CO2 SERPL-SCNC: 25 MMOL/L (ref 21–32)
CREAT SERPL-MCNC: <0.5 MG/DL (ref 0.6–1.2)
DEPRECATED RDW RBC AUTO: 14.6 % (ref 12.4–15.4)
EOSINOPHIL # BLD: 0.2 K/UL (ref 0–0.6)
EOSINOPHIL NFR BLD: 2.4 %
GFR SERPLBLD CREATININE-BSD FMLA CKD-EPI: >90 ML/MIN/{1.73_M2}
GLUCOSE SERPL-MCNC: 82 MG/DL (ref 70–99)
HCT VFR BLD AUTO: 24.7 % (ref 36–48)
HGB BLD-MCNC: 8.1 G/DL (ref 12–16)
LYMPHOCYTES # BLD: 2 K/UL (ref 1–5.1)
LYMPHOCYTES NFR BLD: 24 %
MCH RBC QN AUTO: 28.9 PG (ref 26–34)
MCHC RBC AUTO-ENTMCNC: 32.8 G/DL (ref 31–36)
MCV RBC AUTO: 88.3 FL (ref 80–100)
MONOCYTES # BLD: 0.7 K/UL (ref 0–1.3)
MONOCYTES NFR BLD: 8.5 %
NEUTROPHILS # BLD: 5.5 K/UL (ref 1.7–7.7)
NEUTROPHILS NFR BLD: 64.3 %
PLATELET # BLD AUTO: 593 K/UL (ref 135–450)
PMV BLD AUTO: 6.6 FL (ref 5–10.5)
POTASSIUM SERPL-SCNC: 4.4 MMOL/L (ref 3.5–5.1)
RBC # BLD AUTO: 2.8 M/UL (ref 4–5.2)
SODIUM SERPL-SCNC: 140 MMOL/L (ref 136–145)
WBC # BLD AUTO: 8.5 K/UL (ref 4–11)

## 2024-05-20 PROCEDURE — 80048 BASIC METABOLIC PNL TOTAL CA: CPT

## 2024-05-20 PROCEDURE — 97530 THERAPEUTIC ACTIVITIES: CPT

## 2024-05-20 PROCEDURE — 36415 COLL VENOUS BLD VENIPUNCTURE: CPT

## 2024-05-20 PROCEDURE — 6370000000 HC RX 637 (ALT 250 FOR IP): Performed by: STUDENT IN AN ORGANIZED HEALTH CARE EDUCATION/TRAINING PROGRAM

## 2024-05-20 PROCEDURE — 97110 THERAPEUTIC EXERCISES: CPT

## 2024-05-20 PROCEDURE — 97535 SELF CARE MNGMENT TRAINING: CPT

## 2024-05-20 PROCEDURE — 1280000000 HC REHAB R&B

## 2024-05-20 PROCEDURE — 97130 THER IVNTJ EA ADDL 15 MIN: CPT

## 2024-05-20 PROCEDURE — 97129 THER IVNTJ 1ST 15 MIN: CPT

## 2024-05-20 PROCEDURE — 85025 COMPLETE CBC W/AUTO DIFF WBC: CPT

## 2024-05-20 PROCEDURE — 6360000002 HC RX W HCPCS: Performed by: STUDENT IN AN ORGANIZED HEALTH CARE EDUCATION/TRAINING PROGRAM

## 2024-05-20 RX ADMIN — AMITRIPTYLINE HYDROCHLORIDE 25 MG: 25 TABLET, FILM COATED ORAL at 21:28

## 2024-05-20 RX ADMIN — ENOXAPARIN SODIUM 40 MG: 100 INJECTION SUBCUTANEOUS at 08:23

## 2024-05-20 RX ADMIN — TROSPIUM CHLORIDE 20 MG: 20 TABLET, FILM COATED ORAL at 05:38

## 2024-05-20 RX ADMIN — HYDROCODONE BITARTRATE AND ACETAMINOPHEN 2 TABLET: 5; 325 TABLET ORAL at 05:37

## 2024-05-20 RX ADMIN — MORPHINE SULFATE 15 MG: 15 TABLET, FILM COATED, EXTENDED RELEASE ORAL at 21:28

## 2024-05-20 RX ADMIN — MIDODRINE HYDROCHLORIDE 5 MG: 5 TABLET ORAL at 08:23

## 2024-05-20 RX ADMIN — MIDODRINE HYDROCHLORIDE 5 MG: 5 TABLET ORAL at 11:41

## 2024-05-20 RX ADMIN — HYDROCODONE BITARTRATE AND ACETAMINOPHEN 2 TABLET: 5; 325 TABLET ORAL at 11:43

## 2024-05-20 RX ADMIN — PANTOPRAZOLE SODIUM 40 MG: 40 TABLET, DELAYED RELEASE ORAL at 05:38

## 2024-05-20 RX ADMIN — ACETAMINOPHEN 650 MG: 325 TABLET ORAL at 16:48

## 2024-05-20 RX ADMIN — TIZANIDINE 4 MG: 4 TABLET ORAL at 21:28

## 2024-05-20 RX ADMIN — TRAZODONE HYDROCHLORIDE 500 MG: 50 TABLET ORAL at 21:28

## 2024-05-20 RX ADMIN — HYDROCODONE BITARTRATE AND ACETAMINOPHEN 2 TABLET: 5; 325 TABLET ORAL at 23:46

## 2024-05-20 RX ADMIN — MIDODRINE HYDROCHLORIDE 5 MG: 5 TABLET ORAL at 16:48

## 2024-05-20 RX ADMIN — HYDROCODONE BITARTRATE AND ACETAMINOPHEN 2 TABLET: 5; 325 TABLET ORAL at 18:05

## 2024-05-20 RX ADMIN — ASPIRIN 81 MG: 81 TABLET, COATED ORAL at 08:23

## 2024-05-20 RX ADMIN — MORPHINE SULFATE 15 MG: 15 TABLET, FILM COATED, EXTENDED RELEASE ORAL at 08:23

## 2024-05-20 RX ADMIN — Medication 5 MG: at 21:28

## 2024-05-20 RX ADMIN — TROSPIUM CHLORIDE 20 MG: 20 TABLET, FILM COATED ORAL at 16:48

## 2024-05-20 ASSESSMENT — PAIN DESCRIPTION - DESCRIPTORS
DESCRIPTORS: DISCOMFORT
DESCRIPTORS: ACHING;DISCOMFORT;SORE
DESCRIPTORS: ACHING;DISCOMFORT;SORE
DESCRIPTORS: ACHING;DISCOMFORT

## 2024-05-20 ASSESSMENT — PAIN SCALES - GENERAL
PAINLEVEL_OUTOF10: 8
PAINLEVEL_OUTOF10: 7
PAINLEVEL_OUTOF10: 5
PAINLEVEL_OUTOF10: 7
PAINLEVEL_OUTOF10: 8

## 2024-05-20 ASSESSMENT — PAIN - FUNCTIONAL ASSESSMENT
PAIN_FUNCTIONAL_ASSESSMENT: PREVENTS OR INTERFERES SOME ACTIVE ACTIVITIES AND ADLS
PAIN_FUNCTIONAL_ASSESSMENT: PREVENTS OR INTERFERES SOME ACTIVE ACTIVITIES AND ADLS

## 2024-05-20 ASSESSMENT — PAIN DESCRIPTION - ONSET
ONSET: ON-GOING
ONSET: ON-GOING

## 2024-05-20 ASSESSMENT — PAIN DESCRIPTION - PAIN TYPE
TYPE: ACUTE PAIN;SURGICAL PAIN
TYPE: ACUTE PAIN;SURGICAL PAIN

## 2024-05-20 ASSESSMENT — PAIN DESCRIPTION - LOCATION
LOCATION: LEG

## 2024-05-20 ASSESSMENT — PAIN DESCRIPTION - FREQUENCY
FREQUENCY: CONTINUOUS
FREQUENCY: CONTINUOUS

## 2024-05-20 ASSESSMENT — PAIN DESCRIPTION - ORIENTATION
ORIENTATION: LEFT

## 2024-05-20 NOTE — PROGRESS NOTES
[facility]: ACUTE REHAB UNIT  SPEECH-LANGUAGE PATHOLOGY      [x] Daily  [] Weekly Care Conference Note  [] Discharge    Patient:Radha Malin      :1960  MRN:[id number]  Rehab Dx/Hx: Tibia/fibula fracture, shaft, left, closed, with routine healing, subsequent encounter [S82.202D, S82.402D]    Precautions: falls  Home situation: lives with partner  ST Dx: [] Aphasia  [] Dysarthria  [] Apraxia   [] Oropharyngeal dysphagia [x] Cognitive Impairment  [] Other:   Date of Admit: 2024  Room #: 0170/0170-01    Current functional status (updated daily):         Pt being seen for : [] Speech/Language Treatment  [] Dysphagia Treatment [x] Cognitive Treatment  [] Other:  Communication: [x]WFL  [] Aphasia  [] Dysarthria  [] Apraxia  [] Pragmatic Impairment [] Non-verbal  [] Hearing Loss  [] Other:   Cognition: [] WFL  [x] Mild  [] Moderate  [] Severe [] Unable to Assess  [] Other:  Memory: [x] WFL  [] Mild  [] Moderate  [] Severe [] Unable to Assess  [] Other:  Behavior: [x] Alert  [x] Cooperative  [x]  Pleasant  [] Confused  [] Agitated  [] Uncooperative  [] Distractible [] Motivated  [] Self-Limiting [x] Anxious  [] Other:  Endurance:  [x] Adequate for participation in SLP sessions  [] Reduced overall  [] Lethargic  [] Other:  Safety: [] No concerns at this time  [x] Reduced insight into deficits  []  Reduced safety awareness [] Not following call light procedures  [] Unable to Assess  [] Other:    Current Diet Order:ADULT DIET; Regular  ADULT ORAL NUTRITION SUPPLEMENT; Breakfast, Dinner; Standard High Calorie/High Protein Oral Supplement  Swallowing Precautions: Sit up for all meals and thereafter for 30 minutes        Date: 2024      Tx session 1  9912-5032 All Needs Met in Tx Session 1   Total Timed Code Min 60    Total Treatment Minutes 60    Individual Treatment Minutes 60    Group Treatment Minutes 0 0   Co-Treat Minutes 0 0   Variance/Reason:  N/a    Pain No c/o pain    Pain Intervention [] RN notified  []

## 2024-05-20 NOTE — PROGRESS NOTES
Occupational Therapy  Facility/Department: [facility]  Rehabilitation Occupational Therapy Daily Treatment Note    Date: 24  Patient Name: Radha Malin       Room: 0170/0170-01  MRN: [id number]  Account: [id number]   : 1960  (63 y.o.) Gender: female                    Past Medical History:  has a past medical history of ADHD (attention deficit hyperactivity disorder), Arthritis, Asthma, Cancer (HCC), History of blood transfusion, Postural hypotension, Recurrent sinus infections, Wears glasses, and Wears partial dentures.  Past Surgical History:   has a past surgical history that includes Dental surgery; Knee arthroscopy; Carpal tunnel release; Nose surgery (Left); Tubal ligation; Ankle fracture surgery (Right, 2014); fracture surgery (Left); Lung removal, partial (Right); Ankle surgery (Right, 2023); and Tibia fracture surgery (Left, 2024).    Restrictions  Restrictions/Precautions: Fall Risk, General Precautions, Weight Bearing, Surgical Protocols  Other position/activity restrictions: \"no heat or ice to breasts\" \"if no drains, then ok to shower on POD 2\", \"no heavy lifting > 10 lbs, no strenuous activities, no house chores, no overhead activities\" per Amanda Armendariz RN d/c instructions. OK for ankle and knee range of motion as tolerated. Encourage motion. Toe touch weightbearing for balance only.  Right Lower Extremity Weight Bearing: Weight Bearing As Tolerated  Left Lower Extremity Weight Bearing: Toe Touch Weight Bearing  Required Braces or Orthoses?: No    Subjective  Subjective: pt in bed at OT arrival. Pain: 5/10 in LLE. /77, HR 97  Restrictions/Precautions: Fall Risk;General Precautions;Weight Bearing;Surgical Protocols             Objective     Cognition  Overall Cognitive Status: Exceptions  Arousal/Alertness: Appropriate responses to stimuli  Following Commands: Follows one step commands consistently  Attention Span: Attends with cues to redirect  Memory: Decreased

## 2024-05-20 NOTE — PROGRESS NOTES
education & training;Equipment evaluation, education, & procurement;Therapeutic activities;Co-Treatment  Safety Devices  Type of Devices: All fall risk precautions in place;Call light within reach;Bed alarm in place;Patient at risk for falls;Left in bed;Nurse notified  Restraints  Restraints Initially in Place: No    EDUCATION  Education  Education Given To: Patient  Education Provided: Role of Therapy;Safety;Transfer Training;Plan of Care;Energy Conservation;Home Exercise Program;Fall Prevention Strategies;Mobility Training;Precautions;Equipment  Education Provided Comments: role of PT, safety, transfers, progression of mobility, precautions  Education Method: Demonstration;Verbal  Barriers to Learning: None  Education Outcome: Demonstrated understanding;Verbalized understanding;Continued education needed        Therapy Time   Individual Concurrent Group Co-treatment   Time In 0900         Time Out 1000         Minutes 60           Timed Code Treatment Minutes: 60 Minutes       Kelly Lincoln PT, 05/20/24 at 3:36 PM

## 2024-05-20 NOTE — CARE COORDINATION
[facility] Case Management/Follow up:    Chart reviewed. IP day #: 6  Consultants Following: n/a  Discharge date: TBD  Therapy recommendations: 24 hour supervision or assist;Home with Home health   DME needed:RW and manual w/c with elevating leg rests, 3-in-1 Commode   Services set up: TBD    63 yr old female. Dx:Tibia/fibula fracture, shaft, left, closed, with routine healing, subsequent encounter. Independent plof rollator level. Lives with spouse in a 2 level home with level entry and walk-n-shower. Patient owns rollator, cane and shower chair.      Restrictions  Restrictions/Precautions  Restrictions/Precautions: Fall Risk;General Precautions;Weight Bearing  Lower Extremity Weight Bearing Restrictions  Left Lower Extremity Weight Bearing: Toe Touch Weight Bearing  Position Activity Restriction  Other position/activity restrictions: Ambulate pt, up as tolerated, Per orders: \"Touch down with minimal weight bearing on operative leg just for balance\"

## 2024-05-20 NOTE — PROGRESS NOTES
Radha Malin  5/20/2024  [id number]    Chief Complaint: Tibia/fibula fracture, shaft, left, closed, with routine healing, subsequent encounter    Subjective:   No acute events overnight. Today Radha is seen in her room. She reports that she is feeling well. She notes that her breast surgeon would like updated pictures. Discussed with nursing, adding pictures to chart.     ROS: denies f/c, n/v, cp, sob    Objective:  Patient Vitals for the past 24 hrs:   BP Temp Temp src Pulse Resp SpO2   05/20/24 1213 -- -- -- -- 16 --   05/20/24 1143 -- -- -- -- 16 --   05/20/24 1139 129/68 -- -- -- -- --   05/20/24 0901 109/72 -- -- 89 -- 96 %   05/20/24 0853 -- -- -- -- 16 --   05/20/24 0821 100/60 98 °F (36.7 °C) Oral 95 16 95 %   05/19/24 2110 127/87 97.7 °F (36.5 °C) Oral 84 16 97 %   05/19/24 1709 -- -- -- -- 16 --   05/19/24 1639 -- -- -- -- 16 --     Gen: No distress, pleasant.   HEENT: Normocephalic, atraumatic.   CV: extremities well perfused   Resp: No respiratory distress. On room air  Abd: Soft, nondistended  Ext: No edema.   Neuro: Alert, oriented, appropriately interactive.     Wt Readings from Last 3 Encounters:   05/14/24 64 kg (141 lb 1.5 oz)   05/07/24 64 kg (141 lb)   06/12/23 62.6 kg (138 lb)       Laboratory data:   Lab Results   Component Value Date    WBC 8.5 05/20/2024    HGB 8.1 (L) 05/20/2024    HCT 24.7 (L) 05/20/2024    MCV 88.3 05/20/2024     (H) 05/20/2024       Lab Results   Component Value Date/Time     05/20/2024 05:20 AM    K 4.4 05/20/2024 05:20 AM     05/20/2024 05:20 AM    CO2 25 05/20/2024 05:20 AM    BUN 16 05/20/2024 05:20 AM    CREATININE <0.5 05/20/2024 05:20 AM    GLUCOSE 82 05/20/2024 05:20 AM    CALCIUM 9.5 05/20/2024 05:20 AM        Therapy progress:  Physical therapy:  Bed Mobility:  Overall Assistance Level: Independent (sit <> long sit position, MOD I)  Sit>supine:  Assistance Level: Modified independent  Supine>sit:  Assistance Level: Modified

## 2024-05-21 PROCEDURE — 1280000000 HC REHAB R&B

## 2024-05-21 PROCEDURE — 97110 THERAPEUTIC EXERCISES: CPT

## 2024-05-21 PROCEDURE — 6370000000 HC RX 637 (ALT 250 FOR IP): Performed by: STUDENT IN AN ORGANIZED HEALTH CARE EDUCATION/TRAINING PROGRAM

## 2024-05-21 PROCEDURE — 97129 THER IVNTJ 1ST 15 MIN: CPT

## 2024-05-21 PROCEDURE — 6360000002 HC RX W HCPCS: Performed by: STUDENT IN AN ORGANIZED HEALTH CARE EDUCATION/TRAINING PROGRAM

## 2024-05-21 PROCEDURE — 97542 WHEELCHAIR MNGMENT TRAINING: CPT

## 2024-05-21 PROCEDURE — 94640 AIRWAY INHALATION TREATMENT: CPT

## 2024-05-21 PROCEDURE — 97530 THERAPEUTIC ACTIVITIES: CPT

## 2024-05-21 PROCEDURE — 97130 THER IVNTJ EA ADDL 15 MIN: CPT

## 2024-05-21 PROCEDURE — 97535 SELF CARE MNGMENT TRAINING: CPT

## 2024-05-21 RX ADMIN — HYDROCODONE BITARTRATE AND ACETAMINOPHEN 2 TABLET: 5; 325 TABLET ORAL at 05:56

## 2024-05-21 RX ADMIN — HYDROCODONE BITARTRATE AND ACETAMINOPHEN 2 TABLET: 5; 325 TABLET ORAL at 12:09

## 2024-05-21 RX ADMIN — MIDODRINE HYDROCHLORIDE 5 MG: 5 TABLET ORAL at 07:36

## 2024-05-21 RX ADMIN — MORPHINE SULFATE 15 MG: 15 TABLET, FILM COATED, EXTENDED RELEASE ORAL at 19:55

## 2024-05-21 RX ADMIN — HYDROCODONE BITARTRATE AND ACETAMINOPHEN 2 TABLET: 5; 325 TABLET ORAL at 19:54

## 2024-05-21 RX ADMIN — TROSPIUM CHLORIDE 20 MG: 20 TABLET, FILM COATED ORAL at 16:49

## 2024-05-21 RX ADMIN — TROSPIUM CHLORIDE 20 MG: 20 TABLET, FILM COATED ORAL at 05:56

## 2024-05-21 RX ADMIN — ASPIRIN 81 MG: 81 TABLET, COATED ORAL at 07:36

## 2024-05-21 RX ADMIN — MORPHINE SULFATE 15 MG: 15 TABLET, FILM COATED, EXTENDED RELEASE ORAL at 07:36

## 2024-05-21 RX ADMIN — PANTOPRAZOLE SODIUM 40 MG: 40 TABLET, DELAYED RELEASE ORAL at 05:56

## 2024-05-21 RX ADMIN — AMITRIPTYLINE HYDROCHLORIDE 25 MG: 25 TABLET, FILM COATED ORAL at 19:55

## 2024-05-21 RX ADMIN — TIZANIDINE 4 MG: 4 TABLET ORAL at 12:09

## 2024-05-21 RX ADMIN — Medication 2 PUFF: at 14:29

## 2024-05-21 RX ADMIN — DIPHENHYDRAMINE HYDROCHLORIDE 25 MG: 25 TABLET ORAL at 19:55

## 2024-05-21 RX ADMIN — MIDODRINE HYDROCHLORIDE 5 MG: 5 TABLET ORAL at 12:09

## 2024-05-21 RX ADMIN — MIDODRINE HYDROCHLORIDE 5 MG: 5 TABLET ORAL at 16:49

## 2024-05-21 RX ADMIN — ENOXAPARIN SODIUM 40 MG: 100 INJECTION SUBCUTANEOUS at 07:35

## 2024-05-21 RX ADMIN — Medication 5 MG: at 19:55

## 2024-05-21 RX ADMIN — TIZANIDINE 4 MG: 4 TABLET ORAL at 19:55

## 2024-05-21 RX ADMIN — TRAZODONE HYDROCHLORIDE 500 MG: 50 TABLET ORAL at 19:54

## 2024-05-21 ASSESSMENT — PAIN DESCRIPTION - ORIENTATION
ORIENTATION: LEFT

## 2024-05-21 ASSESSMENT — PAIN DESCRIPTION - FREQUENCY
FREQUENCY: CONTINUOUS
FREQUENCY: CONTINUOUS

## 2024-05-21 ASSESSMENT — PAIN DESCRIPTION - LOCATION
LOCATION: LEG

## 2024-05-21 ASSESSMENT — PAIN SCALES - GENERAL
PAINLEVEL_OUTOF10: 7
PAINLEVEL_OUTOF10: 6
PAINLEVEL_OUTOF10: 8
PAINLEVEL_OUTOF10: 7
PAINLEVEL_OUTOF10: 6
PAINLEVEL_OUTOF10: 7

## 2024-05-21 ASSESSMENT — PAIN DESCRIPTION - ONSET
ONSET: ON-GOING
ONSET: ON-GOING

## 2024-05-21 ASSESSMENT — PAIN DESCRIPTION - DESCRIPTORS
DESCRIPTORS: ACHING;DISCOMFORT;SORE
DESCRIPTORS: ACHING;DISCOMFORT;THROBBING
DESCRIPTORS: THROBBING
DESCRIPTORS: PINS AND NEEDLES

## 2024-05-21 ASSESSMENT — PAIN DESCRIPTION - PAIN TYPE
TYPE: CHRONIC PAIN;SURGICAL PAIN
TYPE: ACUTE PAIN;SURGICAL PAIN

## 2024-05-21 NOTE — PROGRESS NOTES
Wheelchair, Standard toilet, From chair with arms  Additional Factors: Verbal cues  Device: Walker  Sit>stand:  Assistance Level: Stand by assist  Skilled Clinical Factors: up to RW; adheres to TTWB LLE without cues  Stand>sit:  Assistance Level: Stand by assist  Skilled Clinical Factors: adheres to TTWB LLE without cues; min cues for hand placement, L foot positioning, and to reach back for armrests prior to sitting  Bed<>chair  Assistance Level: Stand by assist  Skilled Clinical Factors: to WC, to toilet, to mat table with RW, pt requested dependent transfer with stedy to toilet (declined attempted ambulation), practiced transfer x2 for hop vs pivoting  Stand Pivot:  Assistance Level: Stand by assist, Contact guard assist  Skilled Clinical Factors: using RW; pt completes x 10 reps total during session (5 to R, 5 to L) with initially SBA, increasing to CGA by end of session d/t increased fatigue and pain. Pt adheres to TTWB LLE without cues for all reps  Lateral transfer:     Car transfer:     Ambulation:     Stairs:     Curb:     Wheelchair:  Surface: Level surface  Device: Standard wheelchair  Assistance Required to Manage Parts: Left leg rest  Assistance Level for Propulsion: Supervision  Propulsion Method: Bilateral upper extremities  Propulsion Quality: Slow velocity  Propulsion Distance: 175 ft  Skilled Clinical Factors: pt able to navigate through doorways and complete multiple turns without assist    Occupational therapy:   Feeding     Grooming/Oral Hygiene  Assistance Level: Stand by assist  Skilled Clinical Factors: pt leans on sink in standing for balance, oral care and face washing  UE Bathing     LE Bathing     UE Dressing  Assistance Level: Set-up  Skilled Clinical Factors: seated in w/c to doff/don shirt  LE Dressing  Assistance Level: Contact guard assist  Skilled Clinical Factors: CGA in standing to manage pants to/from waist while adhering to TTWB on LLE, seated to thread BLE through pants with  TBD    Shannan Alan MD  PM&R  5/21/2024  1:45 PM

## 2024-05-21 NOTE — PROGRESS NOTES
Occupational Therapy  Facility/Department: [facility]  Rehabilitation Occupational Therapy Daily Treatment Note    Date: 24  Patient Name: Radha Malin       Room: 0170/0170-01  MRN: [id number]  Account: [id number]   : 1960  (63 y.o.) Gender: female                    Past Medical History:  has a past medical history of ADHD (attention deficit hyperactivity disorder), Arthritis, Asthma, Cancer (HCC), History of blood transfusion, Postural hypotension, Recurrent sinus infections, Wears glasses, and Wears partial dentures.  Past Surgical History:   has a past surgical history that includes Dental surgery; Knee arthroscopy; Carpal tunnel release; Nose surgery (Left); Tubal ligation; Ankle fracture surgery (Right, 2014); fracture surgery (Left); Lung removal, partial (Right); Ankle surgery (Right, 2023); and Tibia fracture surgery (Left, 2024).    Restrictions  Restrictions/Precautions: Fall Risk, General Precautions, Weight Bearing, Surgical Protocols  Other position/activity restrictions: \"no heat or ice to breasts\" \"if no drains, then ok to shower on POD 2\", \"no heavy lifting > 10 lbs, no strenuous activities, no house chores, no overhead activities\" per Amanda Armendariz RN d/c instructions. OK for ankle and knee range of motion as tolerated. Encourage motion. Toe touch weightbearing for balance only.  Right Lower Extremity Weight Bearing: Weight Bearing As Tolerated  Left Lower Extremity Weight Bearing: Toe Touch Weight Bearing  Required Braces or Orthoses?: No    Subjective  Subjective: Pt in bed at OT arrival. Pain: 5/10. /69  Restrictions/Precautions: Fall Risk;General Precautions;Weight Bearing;Surgical Protocols             Objective     Cognition  Overall Cognitive Status: Exceptions  Arousal/Alertness: Appropriate responses to stimuli;Delayed responses to stimuli  Following Commands: Follows one step commands consistently  Attention Span: Attends with cues to  Role of Therapy;Home Exercise Program;Energy Conservation;ADL Function;Fall Prevention Strategies;Equipment  Education Provided Comments:  role of OT, POC, safety with transfers, technique for transfers, hand placement  Education Method: Verbal  Barriers to Learning: Cognition  Education Outcome: Continued education needed    Plan  Occupational Therapy Plan  Times Per Week: 5-7/week  Current Treatment Recommendations: Strengthening;Balance training;Functional mobility training;Endurance training;Self-Care / ADL;Patient/Caregiver education & training;Safety education & training;Pain management;Positioning    Goals  Patient Goals   Patient goals : \"to get up and walking\"  Short Term Goals  Time Frame for Short Term Goals: 5/22  Short Term Goal 1: Pt will perform LB dressing with mod A and AE PRN - goal met 5/17  Short Term Goal 2: Pt will perform toilet transfer with CGA  Short Term Goal 3: Pt will perform footwear with mod A and AE PRN- goal met 5/17  Short Term Goal 4: Pt will perform LB bathing with CGA  Long Term Goals  Time Frame for Long Term Goals : 5/29  Long Term Goal 1: Pt will perform LB dressing mod I with AE PRN  Long Term Goal 2: Pt will perform LB bathing mod I  Long Term Goal 3: Pt will perform toilet transfer mod I  Long Term Goal 4: Pt will perform toileting task mod I  Long Term Goal 5: Pt will doff/don footwear mod I with AE PRN- goal met 5/20      Therapy Time   Individual Concurrent Group Co-treatment   Time In 1030         Time Out 1130         Minutes 60         Timed Code Treatment Minutes: 60 Minutes       Albert Daniel OT

## 2024-05-21 NOTE — PROGRESS NOTES
[facility]: ACUTE REHAB UNIT  SPEECH-LANGUAGE PATHOLOGY      [x] Daily  [] Weekly Care Conference Note  [] Discharge    Patient:Radha Malin      :1960  MRN:[id number]  Rehab Dx/Hx: Tibia/fibula fracture, shaft, left, closed, with routine healing, subsequent encounter [S82.202D, S82.402D]    Precautions: falls  Home situation: lives with partner. Indep completes laundry. Otherwise, shares responsibility for med management, finances, and other household tasks with partner.   ST Dx: [] Aphasia  [] Dysarthria  [] Apraxia   [] Oropharyngeal dysphagia [x] Cognitive Impairment  [] Other:   Date of Admit: 2024  Room #: 0170/0170-01    Current functional status (updated daily):         Pt being seen for : [] Speech/Language Treatment  [] Dysphagia Treatment [x] Cognitive Treatment  [] Other:  Communication: [x]WFL  [] Aphasia  [] Dysarthria  [] Apraxia  [] Pragmatic Impairment [] Non-verbal  [] Hearing Loss  [] Other:   Cognition: [] WFL  [x] Mild  [] Moderate  [] Severe [] Unable to Assess  [] Other:  Memory: [x] WFL  [] Mild  [] Moderate  [] Severe [] Unable to Assess  [] Other:  Behavior: [x] Alert  [x] Cooperative  [x]  Pleasant  [] Confused  [] Agitated  [] Uncooperative  [] Distractible [] Motivated  [] Self-Limiting [x] Anxious  [] Other:  Endurance:  [x] Adequate for participation in SLP sessions  [] Reduced overall  [] Lethargic  [] Other:  Safety: [] No concerns at this time  [x] Reduced insight into deficits  []  Reduced safety awareness [] Not following call light procedures  [] Unable to Assess  [] Other:    Current Diet Order:ADULT DIET; Regular  ADULT ORAL NUTRITION SUPPLEMENT; Breakfast, Dinner; Standard High Calorie/High Protein Oral Supplement  Swallowing Precautions: Sit up for all meals and thereafter for 30 minutes        Date: 2024      Tx session 1  0830 - 0930 Tx session 2  All tx needs met in session 1   Total Timed Code Min 60    Total Treatment Minutes 60    Individual Treatment  and visual reasoning tasks with 80% acc given min cues Odd One Out Task   -pt given 4 items; asked to name the item that did not belong with the others and to explain why  -pt identified the odd one out with 81% acc indep, improving to 100% acc given min cues  -pt then explained why with 90% acc indep, improving to 100% acc given min cues      Goal 4: Pt will complete problem solving and thought organization tasks with 80% acc given min cues   Thought Organization / Category Members Task  -SLP assigned a category to the colors of a deck of cards (e.g. red = names, black = food)   -pt completed task with 100% acc indep.      Other areas targeted:     Education:   SLP educates pt in thought org strategies, verbal/visual reasoning strategies, calendar planning strategies    Safety Devices: [x] Call light within reach  [] Chair alarm activated  [x] Bed alarm activated  [] Other: [] Call light within reach  [] Chair alarm activated  [] Bed alarm activated  [] Other:    Assessment: Pt alert and oriented, cooperative and agreeable to participate in session. Pt reported pain/discomfort in left leg and left eye this morning but was able to still fully participate in tx activities. Pt did well with thought organization task and completed activity independently this date. Pt improved acc with letter attention task, calendar planning task and odd one out task with min cueing. Pt reported difficulty with writing d/t arthritis; therefore, SLP assisted with writing during calendar planning task. Continue to target above tx goals.    Plan: Continue as per plan of care.      Additional Information:     Barriers toward progress: Cognitive deficit and Anxiety  Discharge recommendations:  [] Home independently  [x] Home with assistance []  24 hour supervision  [] ECF [] Other:  Continued Tx Upon Discharge: ? [] Yes [] No [x] TBD based on progress while on ARU [] Vital Stim indicated [] Other:   Estimated discharge date:

## 2024-05-21 NOTE — PATIENT CARE CONFERENCE
[facility]  Inpatient Rehabilitation  Weekly Team Conference Note    Date: 2024  Patient Name: Radha Malin        MRN: [id number]    : 1960  (63 y.o.)  Gender: female   Referring Practitioner: Waqas  Diagnosis: L tib/fib fx d/t fall, s/p L tibia-fibula IMN 24      Interventions to be utilized toward barriers to discharge, per discipline:  NURSING  Nursing observed barriers to dc: Pain, Wound Care, Medication managment  Nursing interventions: assist with ADL's. Medication management, pain control  Family Education: No  Fall Risk:  Yes    Stay with me?: No    PHYSICAL THERAPY  Physical therapy observed barriers to dc:    Baseline: Lives with , two level home, level entry, able to stay on main floor, Annette functional mobility and gait with SPC   Current level: Annette bed mobility, SBA t/f, Annette w/c mobility, up to [place] for ramp, up to 8' RW CGA to [place] (mostly declines gait d/t WB status)   Barriers to DC: WB status, decreased strength, balance and activity tolerance   Needs in order to achieve dc home/next level of care: Needs to be Annette functional mobility and w/c mobility with LRAD, needs manual w/c and RW. Would benefit from family training closer to d/c. Recommend 24hrA and HHPT.      Physical therapy interventions:   Current Treatment Recommendations: Strengthening, Balance training, Functional mobility training, Transfer training, Endurance training, Gait training, Home exercise program, Safety education & training, Patient/Caregiver education & training, Equipment evaluation, education, & procurement, Therapeutic activities, Co-Treatment, Wheelchair mobility training, Neuromuscular re-education, Manual, Positioning, Modalities    PT Goals:            Short Term Goals  Time Frame for Short Term Goals:   Short Term Goal 1: Pt will complete bed mobility with Mod I and without use of B HR-- GOAL MET , pt completes bed mobility with mod I  Short Term Goal 2:  environment across all disciplines to assist with daily recall of staff, schedule, and safety precautions.   2.) pt will perform toilet transfer with SPV  3.) Pt will complete functional t/f with LRAD with Annette  4.)  5.)      []  Family Training discussed at conference and to be scheduled.      Discharge Plan   Estimated discharge date: 5/27/24  Destination: skilled nursing facility  Pass:No  Services at Discharge: SNF Physical Therapy, Occupational Therapy, Speech Therapy, and Nursing   Equipment at Discharge: Defer to SNF    Team Members Present at Conference:  : Patience Diallo RN    Occupational Therapist: Albert Daniel, OTR/L  Physical Therapist: Shu Rob PT, DPT  Speech Therapist: Vani Montgomery MA CCC-SLP   Nurse: Rachel Cedeño RN  Dietician: Svitlana Arboleda RDN, LD  : Santosh Tompkins, OTR/L  Psychiatry: N/A    Family members present at conference: No      I led this team conference and I approve the established interdisciplinary plan of care as documented within the medical record of Radha Kiser MD: Electronically signed by Shannan Alan MD on 5/22/2024 at 12:16 PM

## 2024-05-21 NOTE — PROGRESS NOTES
Physical Therapy  Facility/Department: [facility]  Rehabilitation Physical Therapy Treatment Note    NAME: Radha Malin  : 1960 (63 y.o.)  MRN: [id number]  CODE STATUS: Full Code    Date of Service: 24       Restrictions:  Restrictions/Precautions: Fall Risk, General Precautions, Weight Bearing, Surgical Protocols  Lower Extremity Weight Bearing Restrictions  Right Lower Extremity Weight Bearing: Weight Bearing As Tolerated  Left Lower Extremity Weight Bearing: Toe Touch Weight Bearing  Position Activity Restriction  Sternal Precautions: 10# Lifting Restrictions  Other position/activity restrictions: \"no heat or ice to breasts\" \"if no drains, then ok to shower on POD 2\", \"no heavy lifting > 10 lbs, no strenuous activities, no house chores, no overhead activities\" per Amanda Armendariz RN d/c instructions. OK for ankle and knee range of motion as tolerated. Encourage motion. Toe touch weightbearing for balance only.     SUBJECTIVE  Subjective  Subjective: Pt supine in bed on approach, agreeable to PT tx  Pain: Pt reports LLE pain \"mild\" after recieving pain medication prior to start of sesison, reports previously aching from positioning               OBJECTIVE  Cognition  Overall Cognitive Status: Exceptions  Arousal/Alertness: Appropriate responses to stimuli;Delayed responses to stimuli  Following Commands: Follows one step commands consistently  Attention Span: Attends with cues to redirect  Memory: Decreased short term memory;Decreased recall of precautions  Safety Judgement: Decreased awareness of need for assistance;Decreased awareness of need for safety  Problem Solving: Assistance required to generate solutions;Assistance required to implement solutions  Insights: Decreased awareness of deficits  Initiation: Requires cues for some  Sequencing: Requires cues for some  Orientation  Overall Orientation Status: Within Functional Limits  Orientation Level: Oriented X4    Functional Mobility  Sit to

## 2024-05-21 NOTE — PLAN OF CARE
Problem: Pain  Goal: Verbalizes/displays adequate comfort level or baseline comfort level  5/21/2024 0802 by Beto Adams, RN  Outcome: Progressing  5/21/2024 0033 by Park Freeman, RN  Outcome: Progressing     Problem: Safety - Adult  Goal: Free from fall injury  Outcome: Progressing

## 2024-05-22 PROCEDURE — 6360000002 HC RX W HCPCS: Performed by: STUDENT IN AN ORGANIZED HEALTH CARE EDUCATION/TRAINING PROGRAM

## 2024-05-22 PROCEDURE — 97530 THERAPEUTIC ACTIVITIES: CPT

## 2024-05-22 PROCEDURE — 97542 WHEELCHAIR MNGMENT TRAINING: CPT

## 2024-05-22 PROCEDURE — 97535 SELF CARE MNGMENT TRAINING: CPT

## 2024-05-22 PROCEDURE — 1280000000 HC REHAB R&B

## 2024-05-22 PROCEDURE — 97130 THER IVNTJ EA ADDL 15 MIN: CPT

## 2024-05-22 PROCEDURE — 97129 THER IVNTJ 1ST 15 MIN: CPT

## 2024-05-22 PROCEDURE — 6370000000 HC RX 637 (ALT 250 FOR IP): Performed by: STUDENT IN AN ORGANIZED HEALTH CARE EDUCATION/TRAINING PROGRAM

## 2024-05-22 RX ADMIN — MIDODRINE HYDROCHLORIDE 5 MG: 5 TABLET ORAL at 17:23

## 2024-05-22 RX ADMIN — TRAZODONE HYDROCHLORIDE 500 MG: 50 TABLET ORAL at 20:18

## 2024-05-22 RX ADMIN — PANTOPRAZOLE SODIUM 40 MG: 40 TABLET, DELAYED RELEASE ORAL at 05:54

## 2024-05-22 RX ADMIN — Medication 5 MG: at 20:18

## 2024-05-22 RX ADMIN — ENOXAPARIN SODIUM 40 MG: 100 INJECTION SUBCUTANEOUS at 09:12

## 2024-05-22 RX ADMIN — MIDODRINE HYDROCHLORIDE 5 MG: 5 TABLET ORAL at 12:25

## 2024-05-22 RX ADMIN — MORPHINE SULFATE 15 MG: 15 TABLET, FILM COATED, EXTENDED RELEASE ORAL at 09:12

## 2024-05-22 RX ADMIN — ASPIRIN 81 MG: 81 TABLET, COATED ORAL at 09:12

## 2024-05-22 RX ADMIN — TROSPIUM CHLORIDE 20 MG: 20 TABLET, FILM COATED ORAL at 05:54

## 2024-05-22 RX ADMIN — HYDROCODONE BITARTRATE AND ACETAMINOPHEN 2 TABLET: 5; 325 TABLET ORAL at 12:24

## 2024-05-22 RX ADMIN — AMITRIPTYLINE HYDROCHLORIDE 25 MG: 25 TABLET, FILM COATED ORAL at 20:18

## 2024-05-22 RX ADMIN — HYDROCODONE BITARTRATE AND ACETAMINOPHEN 2 TABLET: 5; 325 TABLET ORAL at 05:54

## 2024-05-22 RX ADMIN — MIDODRINE HYDROCHLORIDE 5 MG: 5 TABLET ORAL at 09:12

## 2024-05-22 RX ADMIN — TROSPIUM CHLORIDE 20 MG: 20 TABLET, FILM COATED ORAL at 17:23

## 2024-05-22 RX ADMIN — TIZANIDINE 4 MG: 4 TABLET ORAL at 20:19

## 2024-05-22 RX ADMIN — MORPHINE SULFATE 15 MG: 15 TABLET, FILM COATED, EXTENDED RELEASE ORAL at 20:18

## 2024-05-22 RX ADMIN — HYDROCODONE BITARTRATE AND ACETAMINOPHEN 2 TABLET: 5; 325 TABLET ORAL at 18:23

## 2024-05-22 ASSESSMENT — PAIN DESCRIPTION - DESCRIPTORS
DESCRIPTORS: ACHING
DESCRIPTORS: ACHING;DISCOMFORT

## 2024-05-22 ASSESSMENT — PAIN DESCRIPTION - ORIENTATION
ORIENTATION: LEFT
ORIENTATION: LEFT

## 2024-05-22 ASSESSMENT — PAIN SCALES - GENERAL
PAINLEVEL_OUTOF10: 7
PAINLEVEL_OUTOF10: 9
PAINLEVEL_OUTOF10: 8
PAINLEVEL_OUTOF10: 9
PAINLEVEL_OUTOF10: 8

## 2024-05-22 ASSESSMENT — PAIN - FUNCTIONAL ASSESSMENT: PAIN_FUNCTIONAL_ASSESSMENT: ACTIVITIES ARE NOT PREVENTED

## 2024-05-22 ASSESSMENT — PAIN DESCRIPTION - LOCATION
LOCATION: LEG
LOCATION: LEG

## 2024-05-22 NOTE — PROGRESS NOTES
Comprehensive Nutrition Assessment    Type and Reason for Visit:  Reassess    Nutrition Recommendations/Plan:   Continue general diet   Continue Ensure BID   Encourage PO intakes   Monitor nutrition adequacy, pertinent labs, bowel habits, wt changes, and clinical progress     Malnutrition Assessment:  Malnutrition Status:  At risk for malnutrition (Comment) (05/22/24 1414)    Context:  Acute Illness     Findings of the 6 clinical characteristics of malnutrition:  Energy Intake:  75% or less of estimated energy requirements for 7 or more days  Weight Loss:  No significant weight loss     Body Fat Loss:  No significant body fat loss     Muscle Mass Loss:  No significant muscle mass loss      Nutrition Assessment:    Follow up: Pt nutritionally at risk AEB fair appetite. Pt ate ~50% of lunch this afternoon. Encouraged PO intakes for healing. Reports decreased appetite for a long time. States eats similarly at home. MARIA ALEJANDRA weight loss. Menu provided to assist with meal ordering. Favorable to Ensure, will continue BID. No BM in several days, bowel regimen ordered. Will continue to monitor.    Nutrition Related Findings:    +1 pitting LLE edema. Active BS. BM on 5/16. Wound Type: Surgical Incision       Current Nutrition Intake & Therapies:    Average Meal Intake: 26-50%, 51-75%, %  Average Supplements Intake: % (per pt report)  ADULT DIET; Regular  ADULT ORAL NUTRITION SUPPLEMENT; Breakfast, Dinner; Standard High Calorie/High Protein Oral Supplement    Anthropometric Measures:  Height: 157.5 cm (5' 2\")  Ideal Body Weight (IBW): 110 lbs (50 kg)       Current Body Weight: 66.7 kg (147 lb),   IBW. Weight Source: Bed Scale  Current BMI (kg/m2): 26.9  Usual Body Weight:  (stated weight hx)                       BMI Categories: Overweight (BMI 25.0-29.9)    Estimated Daily Nutrient Needs:  Energy Requirements Based On: Kcal/kg  Weight Used for Energy Requirements: Current  Energy (kcal/day): 3972-7936  Weight Used for

## 2024-05-22 NOTE — PROGRESS NOTES
Radha Malin  5/22/2024  [id number]    Chief Complaint: Tibia/fibula fracture, shaft, left, closed, with routine healing, subsequent encounter    Subjective:   No acute events overnight. Today Radha is seen in her room. Discussed WB status time length. She is concerned about being able to go home and care for herself since her  works. She is interested in SNF.     ROS: denies f/c, n/v, cp, sob    Objective:  Patient Vitals for the past 24 hrs:   BP Temp Temp src Pulse Resp SpO2   05/22/24 0900 124/79 -- -- 100 16 90 %   05/22/24 0624 -- -- -- -- 16 --   05/22/24 0554 -- -- -- -- 16 --   05/21/24 2025 -- -- -- -- 16 --   05/21/24 2024 -- -- -- -- 16 --   05/21/24 1954 110/70 98.4 °F (36.9 °C) Oral 90 16 96 %   05/21/24 1645 101/68 -- -- 84 -- --   05/21/24 1546 113/81 -- -- 92 -- 92 %     Gen: No distress, pleasant.   HEENT: Normocephalic, atraumatic.   CV: extremities well perfused   Resp: No respiratory distress. On room air  Abd: Soft, nondistended  Ext: LLE wrapped in ACE  Neuro: Alert, oriented, appropriately interactive.     Wt Readings from Last 3 Encounters:   05/21/24 66.8 kg (147 lb 4.3 oz)   05/07/24 64 kg (141 lb)   06/12/23 62.6 kg (138 lb)       Laboratory data:   Lab Results   Component Value Date    WBC 8.5 05/20/2024    HGB 8.1 (L) 05/20/2024    HCT 24.7 (L) 05/20/2024    MCV 88.3 05/20/2024     (H) 05/20/2024       Lab Results   Component Value Date/Time     05/20/2024 05:20 AM    K 4.4 05/20/2024 05:20 AM     05/20/2024 05:20 AM    CO2 25 05/20/2024 05:20 AM    BUN 16 05/20/2024 05:20 AM    CREATININE <0.5 05/20/2024 05:20 AM    GLUCOSE 82 05/20/2024 05:20 AM    CALCIUM 9.5 05/20/2024 05:20 AM        Therapy progress:  Physical therapy:  Bed Mobility:  Overall Assistance Level: Independent (sit <> long sit position, MOD I)  Sit>supine:  Assistance Level: Modified independent  Skilled Clinical Factors: HOB slightly elevated, use of BR  Supine>sit:  Assistance Level:  as needed for regular bowel movements     Bladder: Check PVR x 3.  IMC if PVR > 200ml or if any volume is > 500 ml.      Sleep: Trazodone qhs     PPX  DVT: lovenox  GI: not indicated on PPI at home     Follow up appointments: Orthopedic Surgery, Breast surgery, PCP    Therapy progress: mod I for bed mobility  Services: SNF  EDOD: 5/27    Interdisciplinary team conference was held today with entire rehab treatment team including PT, OT, SLP (if applicable), Dietician, RN, and SW. Discussion focused on progress toward rehab goals and discharge planning. Making progress. Barriers include availability of assistance, home setup, endurance, pain, comorbidities. We as a medical team, and I as the physician , made a plan to work on these barriers to facilitate safe discharge. Plan will be presented to patient/family (if available).     Shannan Alan MD  PM&R  5/22/2024  1:34 PM   Satisfactory

## 2024-05-22 NOTE — PLAN OF CARE
Problem: Safety - Adult  Goal: Free from fall injury  5/22/2024 0005 by Isa Sahni RN  Outcome: Progressing  5/22/2024 0004 by Isa Sahni RN  Outcome: Progressing     Problem: Pain  Goal: Verbalizes/displays adequate comfort level or baseline comfort level  5/22/2024 0005 by Isa Sahni RN  Outcome: Progressing  5/22/2024 0004 by Isa Sahni RN  Outcome: Progressing

## 2024-05-22 NOTE — CARE COORDINATION
Weekly team care conference with interdisciplinary team on: 5/22/24     Chart reviewed for length of stay. IP day # 8  Unit: ARU   Diagnosis and current status as per MD progress: Tibia/fibula fracture, shaft, left, closed, with routine healing, subsequent encounter   Consultants Following: n/a  Planned Discharge Date: 5/27/24  Durable medical equipment needed: Defer to SNF if approved by insurance or RW and manual w/c with elevating legrests and swing away armrests and w/c cushion,  3-in-1 Commode     Discharge Plan:     Destination: skilled nursing facility  Services at Discharge: SNF Physical Therapy, Occupational Therapy, Speech Therapy, and Nursing     Writer spoke to patient at bedside with updated team conference DCP including discharge date of 5/27, therapy recommendations of 24 hour supervision or assist;Home with Home health and DME needed on Dc. Patient does not feel that she has adequate help at home d/t spouse and sister work. Patient would like to go to a facility instead. Writer acknowledged and provided patient with [employer] Medicare SNF list. Patient stated that she will update writer with her preferences. Precert will need to be started today or tomorrow d/t holiday schedule for Monday 5/27.    Patience Diallo RN

## 2024-05-22 NOTE — PROGRESS NOTES
[facility]: ACUTE REHAB UNIT  SPEECH-LANGUAGE PATHOLOGY      [x] Daily  [] Weekly Care Conference Note  [] Discharge    Patient:Radha Malin      :1960  MRN:[id number]  Rehab Dx/Hx: Tibia/fibula fracture, shaft, left, closed, with routine healing, subsequent encounter [S82.202D, S82.402D]    Precautions: falls  Home situation: lives with partner. Indep completes laundry. Otherwise, shares responsibility for med management, finances, and other household tasks with partner.   ST Dx: [] Aphasia  [] Dysarthria  [] Apraxia   [] Oropharyngeal dysphagia [x] Cognitive Impairment  [] Other:   Date of Admit: 2024  Room #: 0170/0170-01    Current functional status (updated daily):         Pt being seen for : [] Speech/Language Treatment  [] Dysphagia Treatment [x] Cognitive Treatment  [] Other:  Communication: [x]WFL  [] Aphasia  [] Dysarthria  [] Apraxia  [] Pragmatic Impairment [] Non-verbal  [] Hearing Loss  [] Other:   Cognition: [] WFL  [x] Mild  [] Moderate  [] Severe [] Unable to Assess  [] Other:  Memory: [x] WFL  [] Mild  [] Moderate  [] Severe [] Unable to Assess  [] Other:  Behavior: [x] Alert  [x] Cooperative  [x]  Pleasant  [] Confused  [] Agitated  [] Uncooperative  [] Distractible [] Motivated  [] Self-Limiting [x] Anxious  [] Other:  Endurance:  [x] Adequate for participation in SLP sessions  [] Reduced overall  [] Lethargic  [] Other:  Safety: [] No concerns at this time  [x] Reduced insight into deficits  []  Reduced safety awareness [] Not following call light procedures  [] Unable to Assess  [] Other:    Current Diet Order:ADULT DIET; Regular  ADULT ORAL NUTRITION SUPPLEMENT; Breakfast, Dinner; Standard High Calorie/High Protein Oral Supplement  Swallowing Precautions: Sit up for all meals and thereafter for 30 minutes        Date: 2024      Tx session 1  0501-1310 Tx session 2  All tx needs met in session 1   Total Timed Code Min 60    Total Treatment Minutes 60    Individual Treatment Minutes  within reach  [] Chair alarm activated  [x] Bed alarm activated  [] Other: [] Call light within reach  [] Chair alarm activated  [] Bed alarm activated  [] Other:    Assessment: Pt seen upright in bed, alert and agreeable to participate. Pt stating interest in receiving additional therapy at another facility upon discharge from ARU. Pt identified goals for ongoing therapies x1 given mod assist. Pt completed task with [employer] card game targeting attention, recall, and planning. Pt completed task with 100% accuracy. Overall, pt tolerated therapy session well. Continue with goals above.      Plan: Continue as per plan of care.      Additional Information:     Barriers toward progress: Cognitive deficit and Anxiety  Discharge recommendations:  [] Home independently  [x] Home with assistance []  24 hour supervision  [] ECF [] Other:  Continued Tx Upon Discharge: ? [] Yes [] No [x] TBD based on progress while on ARU [] Vital Stim indicated [] Other:   Estimated discharge date: 05/31/24    Interventions used this date:  [] Speech/Language Treatment  [] Instruction in HEP [] Group [] Dysphagia Treatment [x] Cognitive Treatment   [] Other:      Total Time Breakdown / Charges    Time in Time out Total Time / units   Cognitive Tx 1000 1100 60 min/ 4 units   Speech Tx -- -- --   Dysphagia Tx -- -- --       Electronically Signed by     Clara Haney MA CCC-SLP #[id number]  Speech Language Pathologist

## 2024-05-22 NOTE — PROGRESS NOTES
Physical Therapy  Facility/Department: [facility]  Rehabilitation Physical Therapy Treatment Note    NAME: Radha Malin  : 1960 (63 y.o.)  MRN: [id number]  CODE STATUS: Full Code    Date of Service: 24       Restrictions:  Restrictions/Precautions: Fall Risk, General Precautions, Weight Bearing, Surgical Protocols  Lower Extremity Weight Bearing Restrictions  Right Lower Extremity Weight Bearing: Weight Bearing As Tolerated  Left Lower Extremity Weight Bearing: Toe Touch Weight Bearing  Position Activity Restriction  Sternal Precautions: 10# Lifting Restrictions  Other position/activity restrictions: \"no heat or ice to breasts\" \"if no drains, then ok to shower on POD 2\", \"no heavy lifting > 10 lbs, no strenuous activities, no house chores, no overhead activities\" per Amanda Armendariz RN d/c instructions. OK for ankle and knee range of motion as tolerated. Encourage motion. Toe touch weightbearing for balance only.     SUBJECTIVE  Subjective  Subjective: Pt supine in bed on approach, agreeable to PT tx  Pain: Pt reports LLE pain but does not provide a pain rating               OBJECTIVE  Orientation  Overall Orientation Status: Within Functional Limits    Functional Mobility  Sit to Supine  Assistance Level: Modified independent  Skilled Clinical Factors: HOB slightly elevated, use of BR  Supine to Sit  Assistance Level: Modified independent  Skilled Clinical Factors: HOB slightly elevated, use of BR    Balance  Sitting Balance: Independent  Standing Balance: Stand by assistance  Standing Balance  Activity: SBA with RW, up to CGA in bathroom while completing reaching task, requires cues for UUE release only to maintain WB px    Transfers  Surface: From bed;Wheelchair;Standard toilet;From chair with arms;From chair without arms  Additional Factors: Verbal cues  Device: Walker (RW, grab bar)  Sit to Stand  Assistance Level: Stand by assist  Skilled Clinical Factors: up to RW; adheres to TTWB LLE with  ambulate 50 feet or more with RW and Mod I.  Long Term Goal 2: Pt will complete HEP independently without cues.  Long Term Goal 3: Pt will perform car transfer with RW and Mod I.  Long Term Goal 4: Pt will perform chair to bed transfers with RW and Mod I.    PLAN OF CARE/SAFETY  Physical Therapy Plan  General Plan: 5-7 times per week  Specific Instructions for Next Treatment: progress mobility as tolerated  Current Treatment Recommendations: Strengthening;Balance training;Functional mobility training;Transfer training;Endurance training;Gait training;Home exercise program;Safety education & training;Patient/Caregiver education & training;Equipment evaluation, education, & procurement;Therapeutic activities;Co-Treatment;Wheelchair mobility training;Neuromuscular re-education;Manual;Positioning;Modalities  Safety Devices  Type of Devices: All fall risk precautions in place;Call light within reach;Bed alarm in place;Patient at risk for falls;Left in bed;Nurse notified;Gait belt    EDUCATION  Education  Education Provided: Role of Therapy;Safety;Transfer Training;Plan of Care;Energy Conservation;Home Exercise Program;Fall Prevention Strategies;Mobility Training;Precautions;Equipment  Education Provided Comments: role of PT, safety, transfers, progression of mobility, precautions  Education Method: Demonstration;Verbal  Barriers to Learning: None  Education Outcome: Demonstrated understanding;Verbalized understanding;Continued education needed        Therapy Time   Individual Concurrent Group Co-treatment   Time In 0900         Time Out 1000         Minutes 60           Timed Code Treatment Minutes: 60 Minutes       Shu Rob PT, DPT 05/22/24 at 11:51 AM

## 2024-05-22 NOTE — PROGRESS NOTES
redirect  Memory: Decreased short term memory;Decreased recall of precautions  Safety Judgement: Decreased awareness of need for assistance;Decreased awareness of need for safety  Problem Solving: Assistance required to generate solutions;Assistance required to implement solutions  Insights: Decreased awareness of deficits  Initiation: Requires cues for some  Sequencing: Requires cues for some  Orientation  Overall Orientation Status: Within Functional Limits  Orientation Level: Oriented X4         ADL  Grooming/Oral Hygiene  Assistance Level: Modified independent;Supervision  Skilled Clinical Factors: able to set up w/c at sink, hair drying & curling, hair brushing, oral care in standing (SPV)  Upper Extremity Bathing  Assistance Level: Modified independent  Skilled Clinical Factors: seated on shower chair  Lower Extremity Bathing  Assistance Level: Supervision  Skilled Clinical Factors: seated on shower chair, lateral leaning to wash buttocks  Upper Extremity Dressing  Assistance Level: Supervision  Skilled Clinical Factors: verbal cues to adhere to px and not raise BUE above head when doffing/donning shirt and bra  Lower Extremity Dressing  Assistance Level: Contact guard assist;Increased time to complete;Verbal cues  Skilled Clinical Factors: CGA in standing to manage pants and underwear to/from waist, seated to thread BLE through underwear and pants with incr time to thread LLE through pants  Putting On/Taking Off Footwear  Assistance Level: Modified independent  Skilled Clinical Factors: doffs/dons R sock  Toileting  Assistance Level: Stand by assist  Skilled Clinical Factors: seated for hygiene and standing for pants management  Toilet Transfers  Equipment: Grab bars;Standard toilet  Additional Factors: Verbal cues;Cues for hand placement;Increased time to complete  Assistance Level: Stand by assist, SPT w/c <> toilet with use of grab bars  Tub/Shower Transfers  Type: Shower  Transfer To: Shower chair with

## 2024-05-23 LAB
ANION GAP SERPL CALCULATED.3IONS-SCNC: 10 MMOL/L (ref 3–16)
BASOPHILS # BLD: 0.1 K/UL (ref 0–0.2)
BASOPHILS NFR BLD: 1.2 %
BUN SERPL-MCNC: 12 MG/DL (ref 7–20)
CALCIUM SERPL-MCNC: 9.4 MG/DL (ref 8.3–10.6)
CHLORIDE SERPL-SCNC: 104 MMOL/L (ref 99–110)
CO2 SERPL-SCNC: 26 MMOL/L (ref 21–32)
CREAT SERPL-MCNC: 0.6 MG/DL (ref 0.6–1.2)
DEPRECATED RDW RBC AUTO: 14.5 % (ref 12.4–15.4)
EOSINOPHIL # BLD: 0.2 K/UL (ref 0–0.6)
EOSINOPHIL NFR BLD: 3.5 %
GFR SERPLBLD CREATININE-BSD FMLA CKD-EPI: >90 ML/MIN/{1.73_M2}
GLUCOSE SERPL-MCNC: 79 MG/DL (ref 70–99)
HCT VFR BLD AUTO: 25.4 % (ref 36–48)
HGB BLD-MCNC: 8 G/DL (ref 12–16)
LYMPHOCYTES # BLD: 1.9 K/UL (ref 1–5.1)
LYMPHOCYTES NFR BLD: 31.6 %
MCH RBC QN AUTO: 27.8 PG (ref 26–34)
MCHC RBC AUTO-ENTMCNC: 31.7 G/DL (ref 31–36)
MCV RBC AUTO: 87.9 FL (ref 80–100)
MONOCYTES # BLD: 0.6 K/UL (ref 0–1.3)
MONOCYTES NFR BLD: 10.4 %
NEUTROPHILS # BLD: 3.2 K/UL (ref 1.7–7.7)
NEUTROPHILS NFR BLD: 53.3 %
PLATELET # BLD AUTO: 560 K/UL (ref 135–450)
PMV BLD AUTO: 6.7 FL (ref 5–10.5)
POTASSIUM SERPL-SCNC: 4.3 MMOL/L (ref 3.5–5.1)
RBC # BLD AUTO: 2.89 M/UL (ref 4–5.2)
SODIUM SERPL-SCNC: 140 MMOL/L (ref 136–145)
WBC # BLD AUTO: 5.9 K/UL (ref 4–11)

## 2024-05-23 PROCEDURE — 6360000002 HC RX W HCPCS: Performed by: STUDENT IN AN ORGANIZED HEALTH CARE EDUCATION/TRAINING PROGRAM

## 2024-05-23 PROCEDURE — 97542 WHEELCHAIR MNGMENT TRAINING: CPT

## 2024-05-23 PROCEDURE — 97530 THERAPEUTIC ACTIVITIES: CPT

## 2024-05-23 PROCEDURE — 85025 COMPLETE CBC W/AUTO DIFF WBC: CPT

## 2024-05-23 PROCEDURE — 97110 THERAPEUTIC EXERCISES: CPT

## 2024-05-23 PROCEDURE — 80048 BASIC METABOLIC PNL TOTAL CA: CPT

## 2024-05-23 PROCEDURE — 6370000000 HC RX 637 (ALT 250 FOR IP): Performed by: STUDENT IN AN ORGANIZED HEALTH CARE EDUCATION/TRAINING PROGRAM

## 2024-05-23 PROCEDURE — 1280000000 HC REHAB R&B

## 2024-05-23 PROCEDURE — 97129 THER IVNTJ 1ST 15 MIN: CPT

## 2024-05-23 PROCEDURE — 36415 COLL VENOUS BLD VENIPUNCTURE: CPT

## 2024-05-23 PROCEDURE — 97130 THER IVNTJ EA ADDL 15 MIN: CPT

## 2024-05-23 RX ORDER — CALCIUM CARBONATE 500 MG/1
500 TABLET, CHEWABLE ORAL 3 TIMES DAILY PRN
Status: DISCONTINUED | OUTPATIENT
Start: 2024-05-23 | End: 2024-05-28 | Stop reason: HOSPADM

## 2024-05-23 RX ORDER — POLYETHYLENE GLYCOL 3350 17 G/17G
17 POWDER, FOR SOLUTION ORAL DAILY PRN
Status: DISCONTINUED | OUTPATIENT
Start: 2024-05-23 | End: 2024-05-28 | Stop reason: HOSPADM

## 2024-05-23 RX ADMIN — HYDROCODONE BITARTRATE AND ACETAMINOPHEN 2 TABLET: 5; 325 TABLET ORAL at 00:31

## 2024-05-23 RX ADMIN — Medication 5 MG: at 19:59

## 2024-05-23 RX ADMIN — MORPHINE SULFATE 15 MG: 15 TABLET, FILM COATED, EXTENDED RELEASE ORAL at 07:58

## 2024-05-23 RX ADMIN — MIDODRINE HYDROCHLORIDE 5 MG: 5 TABLET ORAL at 11:36

## 2024-05-23 RX ADMIN — MIDODRINE HYDROCHLORIDE 5 MG: 5 TABLET ORAL at 07:58

## 2024-05-23 RX ADMIN — TROSPIUM CHLORIDE 20 MG: 20 TABLET, FILM COATED ORAL at 15:21

## 2024-05-23 RX ADMIN — AMITRIPTYLINE HYDROCHLORIDE 25 MG: 25 TABLET, FILM COATED ORAL at 19:58

## 2024-05-23 RX ADMIN — ASPIRIN 81 MG: 81 TABLET, COATED ORAL at 07:57

## 2024-05-23 RX ADMIN — MIDODRINE HYDROCHLORIDE 5 MG: 5 TABLET ORAL at 16:54

## 2024-05-23 RX ADMIN — MORPHINE SULFATE 15 MG: 15 TABLET, FILM COATED, EXTENDED RELEASE ORAL at 19:59

## 2024-05-23 RX ADMIN — HYDROCODONE BITARTRATE AND ACETAMINOPHEN 2 TABLET: 5; 325 TABLET ORAL at 12:51

## 2024-05-23 RX ADMIN — HYDROCODONE BITARTRATE AND ACETAMINOPHEN 2 TABLET: 5; 325 TABLET ORAL at 06:23

## 2024-05-23 RX ADMIN — DIPHENHYDRAMINE HYDROCHLORIDE 25 MG: 25 TABLET ORAL at 00:31

## 2024-05-23 RX ADMIN — TRAZODONE HYDROCHLORIDE 500 MG: 50 TABLET ORAL at 19:58

## 2024-05-23 RX ADMIN — ENOXAPARIN SODIUM 40 MG: 100 INJECTION SUBCUTANEOUS at 07:57

## 2024-05-23 RX ADMIN — CALCIUM CARBONATE 500 MG: 500 TABLET, CHEWABLE ORAL at 14:52

## 2024-05-23 RX ADMIN — PANTOPRAZOLE SODIUM 40 MG: 40 TABLET, DELAYED RELEASE ORAL at 06:23

## 2024-05-23 RX ADMIN — TIZANIDINE 4 MG: 4 TABLET ORAL at 19:59

## 2024-05-23 RX ADMIN — HYDROCODONE BITARTRATE AND ACETAMINOPHEN 2 TABLET: 5; 325 TABLET ORAL at 18:56

## 2024-05-23 RX ADMIN — TROSPIUM CHLORIDE 20 MG: 20 TABLET, FILM COATED ORAL at 06:23

## 2024-05-23 ASSESSMENT — PAIN DESCRIPTION - ORIENTATION
ORIENTATION: LEFT

## 2024-05-23 ASSESSMENT — PAIN DESCRIPTION - LOCATION
LOCATION: BUTTOCKS;LEG
LOCATION: LEG
LOCATION: LEG
LOCATION: BUTTOCKS;LEG;ANKLE
LOCATION: LEG
LOCATION: LEG

## 2024-05-23 ASSESSMENT — PAIN - FUNCTIONAL ASSESSMENT
PAIN_FUNCTIONAL_ASSESSMENT: ACTIVITIES ARE NOT PREVENTED
PAIN_FUNCTIONAL_ASSESSMENT: PREVENTS OR INTERFERES SOME ACTIVE ACTIVITIES AND ADLS
PAIN_FUNCTIONAL_ASSESSMENT: PREVENTS OR INTERFERES SOME ACTIVE ACTIVITIES AND ADLS
PAIN_FUNCTIONAL_ASSESSMENT: ACTIVITIES ARE NOT PREVENTED
PAIN_FUNCTIONAL_ASSESSMENT: PREVENTS OR INTERFERES SOME ACTIVE ACTIVITIES AND ADLS
PAIN_FUNCTIONAL_ASSESSMENT: ACTIVITIES ARE NOT PREVENTED

## 2024-05-23 ASSESSMENT — PAIN DESCRIPTION - PAIN TYPE
TYPE: CHRONIC PAIN;SURGICAL PAIN
TYPE: SURGICAL PAIN

## 2024-05-23 ASSESSMENT — PAIN DESCRIPTION - FREQUENCY: FREQUENCY: CONTINUOUS

## 2024-05-23 ASSESSMENT — PAIN DESCRIPTION - DESCRIPTORS
DESCRIPTORS: THROBBING
DESCRIPTORS: THROBBING
DESCRIPTORS: ACHING;DISCOMFORT;THROBBING
DESCRIPTORS: ACHING;DISCOMFORT;THROBBING
DESCRIPTORS: ACHING
DESCRIPTORS: THROBBING;DISCOMFORT;ACHING

## 2024-05-23 ASSESSMENT — PAIN SCALES - GENERAL
PAINLEVEL_OUTOF10: 8
PAINLEVEL_OUTOF10: 8
PAINLEVEL_OUTOF10: 5
PAINLEVEL_OUTOF10: 8
PAINLEVEL_OUTOF10: 7
PAINLEVEL_OUTOF10: 8

## 2024-05-23 ASSESSMENT — PAIN SCALES - WONG BAKER: WONGBAKER_NUMERICALRESPONSE: NO HURT

## 2024-05-23 ASSESSMENT — PAIN DESCRIPTION - ONSET: ONSET: ON-GOING

## 2024-05-23 NOTE — PLAN OF CARE
Problem: Safety - Adult  Goal: Free from fall injury  Outcome: Progressing     Problem: Pain  Goal: Verbalizes/displays adequate comfort level or baseline comfort level  Outcome: Progressing     Problem: Skin/Tissue Integrity  Goal: Absence of new skin breakdown  Description:  Monitor for areas of redness and/or skin breakdown  Outcome: Progressing     Problem: ABCDS Injury Assessment  Goal: Absence of physical injury  Outcome: Progressing

## 2024-05-23 NOTE — PROGRESS NOTES
Occupational Therapy  Facility/Department: [facility]  Rehabilitation Occupational Therapy Daily Treatment Note    Date: 24  Patient Name: Radha Malin       Room: 0170/0170-01  MRN: [id number]  Account: [id number]   : 1960  (63 y.o.) Gender: female                    Past Medical History:  has a past medical history of ADHD (attention deficit hyperactivity disorder), Arthritis, Asthma, Cancer (HCC), History of blood transfusion, Postural hypotension, Recurrent sinus infections, Wears glasses, and Wears partial dentures.  Past Surgical History:   has a past surgical history that includes Dental surgery; Knee arthroscopy; Carpal tunnel release; Nose surgery (Left); Tubal ligation; Ankle fracture surgery (Right, 2014); fracture surgery (Left); Lung removal, partial (Right); Ankle surgery (Right, 2023); and Tibia fracture surgery (Left, 2024).    Restrictions  Restrictions/Precautions: Fall Risk, General Precautions, Weight Bearing, Surgical Protocols  Other position/activity restrictions: \"no heat or ice to breasts\" \"if no drains, then ok to shower on POD 2\", \"no heavy lifting > 10 lbs, no strenuous activities, no house chores, no overhead activities\" per Amanda Armendariz RN d/c instructions. OK for ankle and knee range of motion as tolerated. Encourage motion. Toe touch weightbearing for balance only.  Right Lower Extremity Weight Bearing: Weight Bearing As Tolerated  Left Lower Extremity Weight Bearing: Toe Touch Weight Bearing  Required Braces or Orthoses?: No    Subjective  Subjective: Pt in bed at OT arrival. Pain: reports pain in LLE, does not rate. /76  Restrictions/Precautions: Fall Risk;General Precautions;Weight Bearing;Surgical Protocols             Objective     Cognition  Overall Cognitive Status: Exceptions  Arousal/Alertness: Appropriate responses to stimuli;Delayed responses to stimuli  Following Commands: Follows one step commands consistently  Attention Span:  training;Functional mobility training;Endurance training;Self-Care / ADL;Patient/Caregiver education & training;Safety education & training;Pain management;Positioning    Goals  Patient Goals   Patient goals : \"to get up and walking\"  Short Term Goals  Time Frame for Short Term Goals: 5/22  Short Term Goal 1: Pt will perform LB dressing with mod A and AE PRN - goal met 5/17  Short Term Goal 2: Pt will perform toilet transfer with CGA- goal met 5/22  Short Term Goal 3: Pt will perform footwear with mod A and AE PRN- goal met 5/17  Short Term Goal 4: Pt will perform LB bathing with CGA- goal met 5/22, seated to wash buttocks with lateral leaning  Long Term Goals  Time Frame for Long Term Goals : 5/29  Long Term Goal 1: Pt will perform LB dressing mod I with AE PRN  Long Term Goal 2: Pt will perform LB bathing mod I  Long Term Goal 3: Pt will perform toilet transfer mod I  Long Term Goal 4: Pt will perform toileting task mod I  Long Term Goal 5: Pt will doff/don footwear mod I with AE PRN- goal met 5/20    Therapy Time   Individual Concurrent Group Co-treatment   Time In 1330         Time Out 1430         Minutes 60         Timed Code Treatment Minutes: 60 Minutes       Albert Daniel, OT

## 2024-05-23 NOTE — PROGRESS NOTES
Radha Malin  5/23/2024  [id number]    Chief Complaint: Tibia/fibula fracture, shaft, left, closed, with routine healing, subsequent encounter    Subjective:   No acute events overnight. Today Radha is seen in her room. She reports continued pain in her leg. She reports having loose stools. Will back off on bowel regimen. Discussed the need to pick a SNF today. She plans to talk about this further with her  and case management.     ROS: denies f/c, n/v, cp, sob    Objective:  Patient Vitals for the past 24 hrs:   BP Temp Temp src Pulse Resp SpO2   05/23/24 1137 123/81 -- -- 93 -- 96 %   05/23/24 1136 116/70 -- -- -- -- --   05/23/24 0754 122/76 98 °F (36.7 °C) Oral 81 16 96 %   05/23/24 0653 -- -- -- -- 16 --   05/23/24 0623 -- -- -- -- 16 --   05/23/24 0101 -- -- -- -- 16 --   05/23/24 0031 -- -- -- -- 16 --   05/22/24 2048 -- -- -- -- 16 --   05/22/24 2018 116/76 98.8 °F (37.1 °C) Oral 88 16 94 %   05/22/24 1725 124/79 -- -- 81 -- --     Gen: No distress, pleasant.   HEENT: Normocephalic, atraumatic.   CV: extremities well perfused   Resp: No respiratory distress. On room air  Abd: Soft, nondistended  Ext: LLE wrapped in ACE  Neuro: Alert, oriented, appropriately interactive.   Psych: appropriate mood and affect    Wt Readings from Last 3 Encounters:   05/21/24 66.8 kg (147 lb 4.3 oz)   05/07/24 64 kg (141 lb)   06/12/23 62.6 kg (138 lb)       Laboratory data:   Lab Results   Component Value Date    WBC 5.9 05/23/2024    HGB 8.0 (L) 05/23/2024    HCT 25.4 (L) 05/23/2024    MCV 87.9 05/23/2024     (H) 05/23/2024       Lab Results   Component Value Date/Time     05/23/2024 05:14 AM    K 4.3 05/23/2024 05:14 AM     05/23/2024 05:14 AM    CO2 26 05/23/2024 05:14 AM    BUN 12 05/23/2024 05:14 AM    CREATININE 0.6 05/23/2024 05:14 AM    GLUCOSE 79 05/23/2024 05:14 AM    CALCIUM 9.4 05/23/2024 05:14 AM        Therapy progress:  Physical therapy:  Bed Mobility:  Overall Assistance Level:

## 2024-05-23 NOTE — CARE COORDINATION
Discussed dispo w pt at bedside, and w spouse on phone. Plan DC 5/27. Therapy recs HHC- wants SNF due to no availability of 24 hr assist and TTWB restriction. Meeting w pt and spouse today at 1430 due to pt obligations for therapy and spouse work commitments. Pt will name SNF choice today- and CM plans to start cert ASAP. Will choose HHC as back up plan. Discussed w attending. Continue to follow.  Anya Mota RN

## 2024-05-23 NOTE — PROGRESS NOTES
perform chair to bed transfers with RW and SBA. -- 5/21: GOAL MET SBA  Long Term Goals  Time Frame for Long Term Goals : 5/29  Long Term Goal 1: Pt will ambulate 50 feet or more with RW and Mod I.  Long Term Goal 2: Pt will complete HEP independently without cues.  Long Term Goal 3: Pt will perform car transfer with RW and Mod I.  Long Term Goal 4: Pt will perform chair to bed transfers with RW and Mod I.    PLAN OF CARE/SAFETY  Physical Therapy Plan  General Plan: 5-7 times per week  Specific Instructions for Next Treatment: progress mobility as tolerated  Current Treatment Recommendations: Strengthening;Balance training;Functional mobility training;Transfer training;Endurance training;Gait training;Home exercise program;Safety education & training;Patient/Caregiver education & training;Equipment evaluation, education, & procurement;Therapeutic activities;Co-Treatment;Wheelchair mobility training;Neuromuscular re-education;Manual;Positioning;Modalities  Safety Devices  Type of Devices: All fall risk precautions in place;Call light within reach;Bed alarm in place;Patient at risk for falls;Left in bed;Nurse notified;Gait belt    EDUCATION  Education  Education Given To: Patient  Education Provided: Role of Therapy;Safety;Transfer Training;Plan of Care;Energy Conservation;Home Exercise Program;Fall Prevention Strategies;Mobility Training;Precautions;Equipment  Education Provided Comments: role of PT, safety, transfers, progression of mobility, precautions  Education Method: Demonstration;Verbal  Barriers to Learning: None  Education Outcome: Demonstrated understanding;Verbalized understanding;Continued education needed        Therapy Time   Individual Concurrent Group Co-treatment   Time In 0930         Time Out 1030         Minutes 60           Timed Code Treatment Minutes: 60 Minutes       Shu Rob PT, DPT 05/23/24 at 12:03 PM

## 2024-05-23 NOTE — CARE COORDINATION
Long discussion with pt, spouse and her sisters at bedside about SNF and HHC.  From list- has chosen [facility] [referral VM left], 2-[facility] [under review], and 3-[facility] [referral VM left]. [facility] was actually first choice- not on [facility] list- VM left at facility to see if by any chance they take this payer.  If so- this would escalate to #1.  Spouse asks to be notified when we learn of acceptance, and start of cert.  CM following.  Anya Mota RN

## 2024-05-23 NOTE — PROGRESS NOTES
[facility]: ACUTE REHAB UNIT  SPEECH-LANGUAGE PATHOLOGY      [x] Daily  [] Weekly Care Conference Note  [] Discharge    Patient:Radha Malin      :1960  MRN:[id number]  Rehab Dx/Hx: Tibia/fibula fracture, shaft, left, closed, with routine healing, subsequent encounter [S82.202D, S82.402D]    Precautions: falls  Home situation: lives with partner. Indep completes laundry. Otherwise, shares responsibility for med management, finances, and other household tasks with partner.   ST Dx: [] Aphasia  [] Dysarthria  [] Apraxia   [] Oropharyngeal dysphagia [x] Cognitive Impairment  [] Other:   Date of Admit: 2024  Room #: 0170/0170-01    Current functional status (updated daily):         Pt being seen for : [] Speech/Language Treatment  [] Dysphagia Treatment [x] Cognitive Treatment  [] Other:  Communication: [x]WFL  [] Aphasia  [] Dysarthria  [] Apraxia  [] Pragmatic Impairment [] Non-verbal  [] Hearing Loss  [] Other:   Cognition: [] WFL  [x] Mild  [] Moderate  [] Severe [] Unable to Assess  [] Other:  Memory: [x] WFL  [] Mild  [] Moderate  [] Severe [] Unable to Assess  [] Other:  Behavior: [x] Alert  [x] Cooperative  [x]  Pleasant  [] Confused  [] Agitated  [] Uncooperative  [] Distractible [] Motivated  [] Self-Limiting [x] Anxious  [] Other:  Endurance:  [x] Adequate for participation in SLP sessions  [] Reduced overall  [] Lethargic  [] Other:  Safety: [] No concerns at this time  [x] Reduced insight into deficits  []  Reduced safety awareness [] Not following call light procedures  [] Unable to Assess  [] Other:    Current Diet Order:ADULT DIET; Regular  ADULT ORAL NUTRITION SUPPLEMENT; Breakfast, Dinner; Standard High Calorie/High Protein Oral Supplement  Swallowing Precautions: Sit up for all meals and thereafter for 30 minutes      Date: 2024      Tx session 1  9218-1294 Tx session 2  All tx needs met in session 1   Total Timed Code Min 60    Total Treatment Minutes 60    Individual Treatment Minutes

## 2024-05-24 PROCEDURE — 97130 THER IVNTJ EA ADDL 15 MIN: CPT

## 2024-05-24 PROCEDURE — 97129 THER IVNTJ 1ST 15 MIN: CPT

## 2024-05-24 PROCEDURE — 1280000000 HC REHAB R&B

## 2024-05-24 PROCEDURE — 97110 THERAPEUTIC EXERCISES: CPT

## 2024-05-24 PROCEDURE — 97542 WHEELCHAIR MNGMENT TRAINING: CPT

## 2024-05-24 PROCEDURE — 6360000002 HC RX W HCPCS: Performed by: STUDENT IN AN ORGANIZED HEALTH CARE EDUCATION/TRAINING PROGRAM

## 2024-05-24 PROCEDURE — 97530 THERAPEUTIC ACTIVITIES: CPT

## 2024-05-24 PROCEDURE — 97535 SELF CARE MNGMENT TRAINING: CPT

## 2024-05-24 PROCEDURE — 6370000000 HC RX 637 (ALT 250 FOR IP): Performed by: STUDENT IN AN ORGANIZED HEALTH CARE EDUCATION/TRAINING PROGRAM

## 2024-05-24 RX ADMIN — TIZANIDINE 4 MG: 4 TABLET ORAL at 16:07

## 2024-05-24 RX ADMIN — HYDROCODONE BITARTRATE AND ACETAMINOPHEN 2 TABLET: 5; 325 TABLET ORAL at 12:15

## 2024-05-24 RX ADMIN — ENOXAPARIN SODIUM 40 MG: 100 INJECTION SUBCUTANEOUS at 08:11

## 2024-05-24 RX ADMIN — TROSPIUM CHLORIDE 20 MG: 20 TABLET, FILM COATED ORAL at 06:14

## 2024-05-24 RX ADMIN — ONDANSETRON 4 MG: 4 TABLET, ORALLY DISINTEGRATING ORAL at 23:37

## 2024-05-24 RX ADMIN — AMITRIPTYLINE HYDROCHLORIDE 25 MG: 25 TABLET, FILM COATED ORAL at 20:28

## 2024-05-24 RX ADMIN — MIDODRINE HYDROCHLORIDE 5 MG: 5 TABLET ORAL at 12:16

## 2024-05-24 RX ADMIN — MORPHINE SULFATE 15 MG: 15 TABLET, FILM COATED, EXTENDED RELEASE ORAL at 20:28

## 2024-05-24 RX ADMIN — MIDODRINE HYDROCHLORIDE 5 MG: 5 TABLET ORAL at 16:07

## 2024-05-24 RX ADMIN — HYDROCODONE BITARTRATE AND ACETAMINOPHEN 2 TABLET: 5; 325 TABLET ORAL at 18:11

## 2024-05-24 RX ADMIN — ASPIRIN 81 MG: 81 TABLET, COATED ORAL at 08:09

## 2024-05-24 RX ADMIN — TIZANIDINE 4 MG: 4 TABLET ORAL at 20:28

## 2024-05-24 RX ADMIN — PANTOPRAZOLE SODIUM 40 MG: 40 TABLET, DELAYED RELEASE ORAL at 06:15

## 2024-05-24 RX ADMIN — MIDODRINE HYDROCHLORIDE 5 MG: 5 TABLET ORAL at 08:10

## 2024-05-24 RX ADMIN — TROSPIUM CHLORIDE 20 MG: 20 TABLET, FILM COATED ORAL at 16:07

## 2024-05-24 RX ADMIN — Medication 5 MG: at 20:28

## 2024-05-24 RX ADMIN — TRAZODONE HYDROCHLORIDE 500 MG: 50 TABLET ORAL at 21:39

## 2024-05-24 RX ADMIN — HYDROCODONE BITARTRATE AND ACETAMINOPHEN 2 TABLET: 5; 325 TABLET ORAL at 06:14

## 2024-05-24 RX ADMIN — CALCIUM CARBONATE 500 MG: 500 TABLET, CHEWABLE ORAL at 23:37

## 2024-05-24 RX ADMIN — CALCIUM CARBONATE 500 MG: 500 TABLET, CHEWABLE ORAL at 15:35

## 2024-05-24 RX ADMIN — MORPHINE SULFATE 15 MG: 15 TABLET, FILM COATED, EXTENDED RELEASE ORAL at 08:10

## 2024-05-24 RX ADMIN — DIPHENHYDRAMINE HYDROCHLORIDE 25 MG: 25 TABLET ORAL at 00:41

## 2024-05-24 RX ADMIN — HYDROCODONE BITARTRATE AND ACETAMINOPHEN 2 TABLET: 5; 325 TABLET ORAL at 00:41

## 2024-05-24 ASSESSMENT — PAIN DESCRIPTION - DESCRIPTORS
DESCRIPTORS: THROBBING
DESCRIPTORS: THROBBING
DESCRIPTORS: ACHING
DESCRIPTORS: ACHING;DISCOMFORT
DESCRIPTORS: ACHING
DESCRIPTORS: ACHING;DISCOMFORT

## 2024-05-24 ASSESSMENT — PAIN DESCRIPTION - ORIENTATION
ORIENTATION: LEFT

## 2024-05-24 ASSESSMENT — PAIN - FUNCTIONAL ASSESSMENT
PAIN_FUNCTIONAL_ASSESSMENT: ACTIVITIES ARE NOT PREVENTED

## 2024-05-24 ASSESSMENT — PAIN SCALES - GENERAL
PAINLEVEL_OUTOF10: 8
PAINLEVEL_OUTOF10: 7
PAINLEVEL_OUTOF10: 7
PAINLEVEL_OUTOF10: 8
PAINLEVEL_OUTOF10: 0
PAINLEVEL_OUTOF10: 6

## 2024-05-24 ASSESSMENT — PAIN DESCRIPTION - LOCATION
LOCATION: LEG;ANKLE
LOCATION: LEG
LOCATION: BUTTOCKS;LEG
LOCATION: LEG
LOCATION: ANKLE
LOCATION: LEG

## 2024-05-24 NOTE — CARE COORDINATION
Writer received vmails from [facility] and [facility], [facility] is not in network with [facility] and cannot accept referral, [facility] can accept referral.  Writer updated pt's spouse/Matthew, he wants to wait and hear back from [facility] and [facility] before making decision.  Writer spoke with Imelda/[facility], she will review referral now.  Writer left vmail for Adenike/covering for Aminata at [facility].  SIMONA Mercado     0302 Addendum:  [facility] can accept referral, [facility] is not in network with [facility].  Writer left vmail for Matthew, asking for choice so writer can start precert.  SIMONA Mercado     4219 Addendum:  Dr Alan updated writer, new day for discharge is Tuesday 5/28.  Matthew called writer and is very happy about delayed discharge as he heard from pt, but writer explained we still need to start precert for SNF in order to make Tuesday discharge happen, Matthew is waiting on responses from pt's sisters.  SIMONA Mercado     3157 Addendum:  [facility]  Writer updated admissions, and started precert per [facility] portal.  Per Dr Alan, pt will not discharge until Tuesday, even if precert approved early, as she needs therapy Monday here.  SIMONA Mercado       9789 Addendum:  Writer spoke with Matthew again, he asked about bedside commode, writer explained DME would be given to pt from the facillty she is discharging home to, either here or S.  Also talked about possible need for private duty aide, left list at bedside and explained to pt as well.  At this point, pt/Matthew aware waiting on insurance for SNF determination, all aware if denied she would have to go home Tuesday with HHC and DME.  SIMONA Mercado

## 2024-05-24 NOTE — CARE COORDINATION
Clinicals faxed to insurance for .  Alexey Mccarthy MPH, RRT  [facility] Admissions Supervisor-[facility]  (C)[phone]  (F)[phone]   Electronically signed by Alexey Mccarthy on 5/24/2024 at 9:20 AM

## 2024-05-24 NOTE — PROGRESS NOTES
Occupational Therapy  Facility/Department: [facility]  Rehabilitation Occupational Therapy Daily Treatment Note    Date: 24  Patient Name: Radha Malin       Room: 0170/0170-01  MRN: [id number]  Account: [id number]   : 1960  (63 y.o.) Gender: female                    Past Medical History:  has a past medical history of ADHD (attention deficit hyperactivity disorder), Arthritis, Asthma, Cancer (HCC), History of blood transfusion, Postural hypotension, Recurrent sinus infections, Wears glasses, and Wears partial dentures.  Past Surgical History:   has a past surgical history that includes Dental surgery; Knee arthroscopy; Carpal tunnel release; Nose surgery (Left); Tubal ligation; Ankle fracture surgery (Right, 2014); fracture surgery (Left); Lung removal, partial (Right); Ankle surgery (Right, 2023); and Tibia fracture surgery (Left, 2024).    Restrictions  Restrictions/Precautions: Fall Risk, General Precautions, Weight Bearing, Surgical Protocols  Other position/activity restrictions: \"no heat or ice to breasts\" \"if no drains, then ok to shower on POD 2\", \"no heavy lifting > 10 lbs, no strenuous activities, no house chores, no overhead activities\" per Amanda Armendariz RN d/c instructions. OK for ankle and knee range of motion as tolerated. Encourage motion. Toe touch weightbearing for balance only.  Right Lower Extremity Weight Bearing: Weight Bearing As Tolerated  Left Lower Extremity Weight Bearing: Toe Touch Weight Bearing  Required Braces or Orthoses?: No    Subjective  Subjective: Pt in bed at OT arrival. Spouse present. Pain: 6/10. /85, HR 92, SpO2 93% on RA  Restrictions/Precautions: Fall Risk;General Precautions;Weight Bearing;Surgical Protocols             Objective     Cognition  Overall Cognitive Status: Exceptions  Arousal/Alertness: Appropriate responses to stimuli;Delayed responses to stimuli  Following Commands: Follows one step commands consistently  Attention  ADL;Patient/Caregiver education & training;Safety education & training;Pain management;Positioning    Goals  Patient Goals   Patient goals : \"to get up and walking\"  Short Term Goals  Time Frame for Short Term Goals: 5/22  Short Term Goal 1: Pt will perform LB dressing with mod A and AE PRN - goal met 5/17  Short Term Goal 2: Pt will perform toilet transfer with CGA- goal met 5/22  Short Term Goal 3: Pt will perform footwear with mod A and AE PRN- goal met 5/17  Short Term Goal 4: Pt will perform LB bathing with CGA- goal met 5/22, seated to wash buttocks with lateral leaning  Long Term Goals  Time Frame for Long Term Goals : 5/29  Long Term Goal 1: Pt will perform LB dressing mod I with AE PRN  Long Term Goal 2: Pt will perform LB bathing mod I  Long Term Goal 3: Pt will perform toilet transfer mod I  Long Term Goal 4: Pt will perform toileting task mod I  Long Term Goal 5: Pt will doff/don footwear mod I with AE PRN- goal met 5/20      Therapy Time   Individual Concurrent Group Co-treatment   Time In 0800         Time Out 0900         Minutes 60         Timed Code Treatment Minutes: 60 Minutes       Albert Daniel, OT

## 2024-05-24 NOTE — PROGRESS NOTES
Radha Malin  5/24/2024  [id number]    Chief Complaint: Tibia/fibula fracture, shaft, left, closed, with routine healing, subsequent encounter    Subjective:   No acute events overnight. Today Radha is seen in her room. She reports concern about moving to next level of care because she feels she is progressing her. Discussed that we have moved her discharge date to Tuesday due to the holiday and need for precert for SNF. She reports last bowel movement was 2 days ago and loose.     ROS: denies f/c, n/v, cp, sob    Objective:  Patient Vitals for the past 24 hrs:   BP Temp Temp src Pulse Resp SpO2   05/24/24 0809 129/85 97.6 °F (36.4 °C) Oral 92 16 94 %   05/24/24 0614 -- -- -- -- 16 --   05/24/24 0111 -- -- -- -- 16 --   05/24/24 0041 -- -- -- -- 16 --   05/23/24 2029 -- -- -- -- 16 --   05/1960 -- -- -- -- 16 --   05/23/24 1945 120/79 98.2 °F (36.8 °C) Oral 100 16 96 %   05/23/24 1856 -- -- -- -- 16 --   05/23/24 1137 123/81 -- -- 93 -- 96 %   05/23/24 1136 116/70 -- -- -- -- --     Gen: No distress, pleasant.   HEENT: Normocephalic, atraumatic.   CV: extremities well perfused   Resp: No respiratory distress. On room air  Abd: Soft, nondistended  Ext: LLE wrapped in ACE  Neuro: Alert, oriented, appropriately interactive.   Psych: appropriate mood and affect    Wt Readings from Last 3 Encounters:   05/21/24 66.8 kg (147 lb 4.3 oz)   05/07/24 64 kg (141 lb)   06/12/23 62.6 kg (138 lb)       Laboratory data:   Lab Results   Component Value Date    WBC 5.9 05/23/2024    HGB 8.0 (L) 05/23/2024    HCT 25.4 (L) 05/23/2024    MCV 87.9 05/23/2024     (H) 05/23/2024       Lab Results   Component Value Date/Time     05/23/2024 05:14 AM    K 4.3 05/23/2024 05:14 AM     05/23/2024 05:14 AM    CO2 26 05/23/2024 05:14 AM    BUN 12 05/23/2024 05:14 AM    CREATININE 0.6 05/23/2024 05:14 AM    GLUCOSE 79 05/23/2024 05:14 AM    CALCIUM 9.4 05/23/2024 05:14 AM        Therapy progress:  Physical therapy:  Bed

## 2024-05-24 NOTE — PROGRESS NOTES
[facility]: ACUTE REHAB UNIT  SPEECH-LANGUAGE PATHOLOGY      [x] Daily  [] Weekly Care Conference Note  [] Discharge    Patient:Radha Malin      :1960  MRN:[id number]  Rehab Dx/Hx: Tibia/fibula fracture, shaft, left, closed, with routine healing, subsequent encounter [S82.202D, S82.402D]    Precautions: falls  Home situation: lives with partner. Indep completes laundry. Otherwise, shares responsibility for med management, finances, and other household tasks with partner.   ST Dx: [] Aphasia  [] Dysarthria  [] Apraxia   [] Oropharyngeal dysphagia [x] Cognitive Impairment  [] Other:   Date of Admit: 2024  Room #: 0170/0170-01    Current functional status (updated daily):         Pt being seen for : [] Speech/Language Treatment  [] Dysphagia Treatment [x] Cognitive Treatment  [] Other:  Communication: [x]WFL  [] Aphasia  [] Dysarthria  [] Apraxia  [] Pragmatic Impairment [] Non-verbal  [] Hearing Loss  [] Other:   Cognition: [] WFL  [x] Mild  [] Moderate  [] Severe [] Unable to Assess  [] Other:  Memory: [x] WFL  [] Mild  [] Moderate  [] Severe [] Unable to Assess  [] Other:  Behavior: [x] Alert  [x] Cooperative  [x]  Pleasant  [] Confused  [] Agitated  [] Uncooperative  [] Distractible [] Motivated  [] Self-Limiting [x] Anxious  [] Other:  Endurance:  [x] Adequate for participation in SLP sessions  [] Reduced overall  [] Lethargic  [] Other:  Safety: [] No concerns at this time  [x] Reduced insight into deficits  []  Reduced safety awareness [] Not following call light procedures  [] Unable to Assess  [] Other:    Current Diet Order:ADULT DIET; Regular  ADULT ORAL NUTRITION SUPPLEMENT; Breakfast, Dinner; Standard High Calorie/High Protein Oral Supplement  Swallowing Precautions: Sit up for all meals and thereafter for 30 minutes      Date: 2024      Tx session 1  [id number] Tx session 2  All tx needs met in session 1   Total Timed Code Min 60    Total Treatment Minutes 60    Individual Treatment Minutes

## 2024-05-24 NOTE — PLAN OF CARE
Problem: Safety - Adult  Goal: Free from fall injury  5/23/2024 2322 by Isa Sahni RN  Outcome: Progressing  5/23/2024 1832 by Maribel Landis RN  Outcome: Progressing     Problem: Pain  Goal: Verbalizes/displays adequate comfort level or baseline comfort level  5/23/2024 2322 by Isa Sahni RN  Outcome: Progressing  5/23/2024 1832 by Maribel Landis RN  Outcome: Progressing   Patient is alert and oriented x4. Patient is a stand/pivot to wheelchair, tolerates well. Continent of bowel and bladder. Fall precautions in place, bed alarm on, nonskid foot wear applied, bed in lowest position, wheels locked and call light/personal items within reach. Pt agreeable to contact staff for assistance. Patient able to use pain rating scale 0/10 adequately without problems.  Pain medications explained along with frequency and when to notify the nurse with  possible side effects. Verbalizes understanding. Medicated with prn Zanaflex for complaints of left leg spasms with effective results. Call light within reach. Resting quietly in bed.

## 2024-05-24 NOTE — PROGRESS NOTES
Physical Therapy  Facility/Department: [facility]  Rehabilitation Physical Therapy Treatment Note    NAME: Radha Malin  : 1960 (63 y.o.)  MRN: [id number]  CODE STATUS: Full Code    Date of Service: 24       Restrictions:  Restrictions/Precautions: Fall Risk, General Precautions, Weight Bearing, Surgical Protocols  Lower Extremity Weight Bearing Restrictions  Right Lower Extremity Weight Bearing: Weight Bearing As Tolerated  Left Lower Extremity Weight Bearing: Toe Touch Weight Bearing  Position Activity Restriction  Sternal Precautions: 10# Lifting Restrictions  Other position/activity restrictions: \"no heat or ice to breasts\" \"if no drains, then ok to shower on POD 2\", \"no heavy lifting > 10 lbs, no strenuous activities, no house chores, no overhead activities\" per Amanda Armendariz RN d/c instructions. OK for ankle and knee range of motion as tolerated. Encourage motion. Toe touch weightbearing for balance only.     SUBJECTIVE  Subjective  Subjective: Pt supine in bed on approach, agreeable to PT tx  Pain: Pt reports LLE pain but does not provide a pain rating         OBJECTIVE  Cognition  Overall Cognitive Status: Exceptions  Arousal/Alertness: Appropriate responses to stimuli;Delayed responses to stimuli  Following Commands: Follows one step commands consistently  Attention Span: Attends with cues to redirect  Memory: Decreased short term memory;Decreased recall of precautions  Safety Judgement: Decreased awareness of need for assistance;Decreased awareness of need for safety  Problem Solving: Assistance required to generate solutions;Assistance required to implement solutions  Insights: Decreased awareness of deficits  Initiation: Requires cues for some  Sequencing: Requires cues for some  Orientation  Overall Orientation Status: Within Functional Limits  Orientation Level: Oriented X4    Functional Mobility  Sit to Supine  Assistance Level: Modified independent  Skilled Clinical Factors: HOB  Required to Manage Parts: Left leg rest  Assistance Level for Propulsion: Modified independent;Stand by assist  Propulsion Quality: Slow velocity;Short strokes;Decreased fluidity  Propulsion Distance: 1000' (including ascending/descending 30' ramp)  Skilled Clinical Factors: Pt completes w/c mobility [facility] having to complete multiple turns, navigate through doors and around obstacles with Kristal. Pt requires SBA to ascend/descend 30' ramp with initial cues for hand placement and positioning to improve safety             PT Exercises  Seated TE at EOM   X 10 modified sit ups with 4 lb MB   X 10 russian twist with 4lb MB   X 15 B ankle circles   X 15 B LAQ  Completed to improve strength and ROM to improve safety and IND with mobility.      ASSESSMENT/PROGRESS TOWARDS GOALS     Vitals:    05/24/24 1600   BP: 132/85   Pulse: 81   Resp:    Temp:    SpO2:          Assessment  Assessment: Pt seen in pm for PT tx, pt agreeable and demonstrates good participation. Pt with improved performance with standing tasks and overall positioning and safety with t/f this date. Pt demonstrates bed mobility with Kristal, t/f with SBA, standing balance with RW with SBA and w/c mobility with Kristal to SBA only for ramp. Pt is limited by activity tolerance with intermittent rest breaks. Pt will benefit from continued skilled PT in ARU to address deficits including decreased strength, balance, activity tolerance and decreased safety and IND with mobility and gait. Will continue to progress mobility as tolerated  Activity Tolerance: Patient tolerated treatment well;Patient limited by endurance;Patient limited by pain  Discharge Recommendations: 24 hour supervision or assist;Home with Home health PT  PT Equipment Recommendations  Equipment Needed: Yes  Mobility Devices: Walker;Wheelchair  Walker: Rolling  Wheelchair: Standard  Other: Pt owns SPC and 4WW. Anticipate pt will require RW and manual w/c with elevating legrests and swing away

## 2024-05-24 NOTE — PLAN OF CARE
Problem: Safety - Adult  Goal: Free from fall injury  Outcome: Progressing     Problem: Pain  Goal: Verbalizes/displays adequate comfort level or baseline comfort level  Outcome: Progressing     Problem: ABCDS Injury Assessment  Goal: Absence of physical injury  Outcome: Progressing     Problem: Nutrition Deficit:  Goal: Optimize nutritional status  Outcome: Progressing

## 2024-05-25 PROCEDURE — 1280000000 HC REHAB R&B

## 2024-05-25 PROCEDURE — 6370000000 HC RX 637 (ALT 250 FOR IP): Performed by: PHYSICAL MEDICINE & REHABILITATION

## 2024-05-25 PROCEDURE — 6370000000 HC RX 637 (ALT 250 FOR IP): Performed by: STUDENT IN AN ORGANIZED HEALTH CARE EDUCATION/TRAINING PROGRAM

## 2024-05-25 PROCEDURE — 6360000002 HC RX W HCPCS: Performed by: STUDENT IN AN ORGANIZED HEALTH CARE EDUCATION/TRAINING PROGRAM

## 2024-05-25 RX ORDER — DULOXETIN HYDROCHLORIDE 20 MG/1
20 CAPSULE, DELAYED RELEASE ORAL DAILY
Status: DISCONTINUED | OUTPATIENT
Start: 2024-05-25 | End: 2024-05-28 | Stop reason: HOSPADM

## 2024-05-25 RX ADMIN — ACETAMINOPHEN 650 MG: 325 TABLET ORAL at 20:20

## 2024-05-25 RX ADMIN — MIDODRINE HYDROCHLORIDE 5 MG: 5 TABLET ORAL at 12:17

## 2024-05-25 RX ADMIN — PANTOPRAZOLE SODIUM 40 MG: 40 TABLET, DELAYED RELEASE ORAL at 06:13

## 2024-05-25 RX ADMIN — HYDROCODONE BITARTRATE AND ACETAMINOPHEN 2 TABLET: 5; 325 TABLET ORAL at 00:07

## 2024-05-25 RX ADMIN — TIZANIDINE 4 MG: 4 TABLET ORAL at 20:21

## 2024-05-25 RX ADMIN — TROSPIUM CHLORIDE 20 MG: 20 TABLET, FILM COATED ORAL at 17:24

## 2024-05-25 RX ADMIN — HYDROCODONE BITARTRATE AND ACETAMINOPHEN 2 TABLET: 5; 325 TABLET ORAL at 12:17

## 2024-05-25 RX ADMIN — AMITRIPTYLINE HYDROCHLORIDE 25 MG: 25 TABLET, FILM COATED ORAL at 20:19

## 2024-05-25 RX ADMIN — HYDROCODONE BITARTRATE AND ACETAMINOPHEN 2 TABLET: 5; 325 TABLET ORAL at 18:57

## 2024-05-25 RX ADMIN — HYDROCODONE BITARTRATE AND ACETAMINOPHEN 2 TABLET: 5; 325 TABLET ORAL at 06:13

## 2024-05-25 RX ADMIN — MIDODRINE HYDROCHLORIDE 5 MG: 5 TABLET ORAL at 09:06

## 2024-05-25 RX ADMIN — MORPHINE SULFATE 15 MG: 15 TABLET, FILM COATED, EXTENDED RELEASE ORAL at 20:21

## 2024-05-25 RX ADMIN — ASPIRIN 81 MG: 81 TABLET, COATED ORAL at 09:06

## 2024-05-25 RX ADMIN — TROSPIUM CHLORIDE 20 MG: 20 TABLET, FILM COATED ORAL at 06:13

## 2024-05-25 RX ADMIN — Medication 5 MG: at 20:20

## 2024-05-25 RX ADMIN — ENOXAPARIN SODIUM 40 MG: 100 INJECTION SUBCUTANEOUS at 09:05

## 2024-05-25 RX ADMIN — MIDODRINE HYDROCHLORIDE 5 MG: 5 TABLET ORAL at 17:24

## 2024-05-25 RX ADMIN — DULOXETINE HYDROCHLORIDE 20 MG: 20 CAPSULE, DELAYED RELEASE ORAL at 17:24

## 2024-05-25 RX ADMIN — MORPHINE SULFATE 15 MG: 15 TABLET, FILM COATED, EXTENDED RELEASE ORAL at 09:06

## 2024-05-25 RX ADMIN — TRAZODONE HYDROCHLORIDE 500 MG: 50 TABLET ORAL at 20:18

## 2024-05-25 ASSESSMENT — PAIN SCALES - GENERAL
PAINLEVEL_OUTOF10: 10
PAINLEVEL_OUTOF10: 7
PAINLEVEL_OUTOF10: 7
PAINLEVEL_OUTOF10: 8
PAINLEVEL_OUTOF10: 7

## 2024-05-25 ASSESSMENT — PAIN DESCRIPTION - DESCRIPTORS
DESCRIPTORS: ACHING

## 2024-05-25 ASSESSMENT — PAIN DESCRIPTION - ORIENTATION
ORIENTATION: LEFT

## 2024-05-25 ASSESSMENT — PAIN DESCRIPTION - LOCATION
LOCATION: LEG

## 2024-05-25 ASSESSMENT — PAIN - FUNCTIONAL ASSESSMENT: PAIN_FUNCTIONAL_ASSESSMENT: ACTIVITIES ARE NOT PREVENTED

## 2024-05-25 NOTE — PROGRESS NOTES
Radha Malin  5/25/2024  [id number]    Chief Complaint: Tibia/fibula fracture, shaft, left, closed, with routine healing, subsequent encounter    Subjective:   Seen in her room this morning. She is doing well but plans to go to a SNF after ARU. She is agreeable to this plan as she does not have the support at home for around the clock care. She is asking to be restarted on Cymbalta as she has an anxiety attack recently.    ROS: denies f/c, n/v, cp, sob    Objective:  Patient Vitals for the past 24 hrs:   BP Temp Temp src Pulse Resp SpO2   05/25/24 0902 117/77 97.7 °F (36.5 °C) Oral 99 16 93 %   05/25/24 0613 -- -- -- -- 16 --   05/24/24 2028 114/76 97.5 °F (36.4 °C) Oral 88 16 --   05/24/24 1811 -- -- -- -- 16 --   05/24/24 1600 132/85 -- -- 81 -- --       Gen: No distress, pleasant.   HEENT: Normocephalic, atraumatic.   CV: extremities well perfused   Resp: No respiratory distress. On room air  Abd: Soft, nondistended  Ext: LLE wrapped in ACE  Neuro: Alert, oriented, appropriately interactive.   Psych: appropriate mood and affect    Wt Readings from Last 3 Encounters:   05/21/24 66.8 kg (147 lb 4.3 oz)   05/07/24 64 kg (141 lb)   06/12/23 62.6 kg (138 lb)       Laboratory data:   Lab Results   Component Value Date    WBC 5.9 05/23/2024    HGB 8.0 (L) 05/23/2024    HCT 25.4 (L) 05/23/2024    MCV 87.9 05/23/2024     (H) 05/23/2024       Lab Results   Component Value Date/Time     05/23/2024 05:14 AM    K 4.3 05/23/2024 05:14 AM     05/23/2024 05:14 AM    CO2 26 05/23/2024 05:14 AM    BUN 12 05/23/2024 05:14 AM    CREATININE 0.6 05/23/2024 05:14 AM    GLUCOSE 79 05/23/2024 05:14 AM    CALCIUM 9.4 05/23/2024 05:14 AM        Therapy progress:  Physical therapy:  Bed Mobility:  Overall Assistance Level: Independent (sit <> long sit position, MOD I)  Sit>supine:  Assistance Level: Modified independent  Skilled Clinical Factors: HOB slightly elevated, use of BR  Supine>sit:  Assistance Level: Modified

## 2024-05-25 NOTE — PLAN OF CARE
Fall precautions in place, bed alarm on, non-skid footwear applied, bed in lowest position  And call light in reach. Will continue  to monitor.

## 2024-05-26 VITALS
OXYGEN SATURATION: 94 % | DIASTOLIC BLOOD PRESSURE: 84 MMHG | HEART RATE: 88 BPM | SYSTOLIC BLOOD PRESSURE: 122 MMHG | BODY MASS INDEX: 26.02 KG/M2 | HEIGHT: 62 IN | TEMPERATURE: 98.3 F | RESPIRATION RATE: 16 BRPM | WEIGHT: 141.4 LBS

## 2024-05-26 PROCEDURE — 1280000000 HC REHAB R&B

## 2024-05-26 PROCEDURE — 6370000000 HC RX 637 (ALT 250 FOR IP): Performed by: PHYSICAL MEDICINE & REHABILITATION

## 2024-05-26 PROCEDURE — 6360000002 HC RX W HCPCS: Performed by: STUDENT IN AN ORGANIZED HEALTH CARE EDUCATION/TRAINING PROGRAM

## 2024-05-26 PROCEDURE — 6370000000 HC RX 637 (ALT 250 FOR IP): Performed by: STUDENT IN AN ORGANIZED HEALTH CARE EDUCATION/TRAINING PROGRAM

## 2024-05-26 RX ADMIN — TIZANIDINE 4 MG: 4 TABLET ORAL at 19:53

## 2024-05-26 RX ADMIN — HYDROCODONE BITARTRATE AND ACETAMINOPHEN 2 TABLET: 5; 325 TABLET ORAL at 00:32

## 2024-05-26 RX ADMIN — TROSPIUM CHLORIDE 20 MG: 20 TABLET, FILM COATED ORAL at 16:17

## 2024-05-26 RX ADMIN — AMITRIPTYLINE HYDROCHLORIDE 25 MG: 25 TABLET, FILM COATED ORAL at 19:53

## 2024-05-26 RX ADMIN — MIDODRINE HYDROCHLORIDE 5 MG: 5 TABLET ORAL at 08:24

## 2024-05-26 RX ADMIN — DULOXETINE HYDROCHLORIDE 20 MG: 20 CAPSULE, DELAYED RELEASE ORAL at 08:27

## 2024-05-26 RX ADMIN — ASPIRIN 81 MG: 81 TABLET, COATED ORAL at 08:24

## 2024-05-26 RX ADMIN — PANTOPRAZOLE SODIUM 40 MG: 40 TABLET, DELAYED RELEASE ORAL at 06:20

## 2024-05-26 RX ADMIN — MORPHINE SULFATE 15 MG: 15 TABLET, FILM COATED, EXTENDED RELEASE ORAL at 19:53

## 2024-05-26 RX ADMIN — MIDODRINE HYDROCHLORIDE 5 MG: 5 TABLET ORAL at 16:17

## 2024-05-26 RX ADMIN — TRAZODONE HYDROCHLORIDE 500 MG: 50 TABLET ORAL at 19:53

## 2024-05-26 RX ADMIN — MORPHINE SULFATE 15 MG: 15 TABLET, FILM COATED, EXTENDED RELEASE ORAL at 08:24

## 2024-05-26 RX ADMIN — CALCIUM CARBONATE 500 MG: 500 TABLET, CHEWABLE ORAL at 18:00

## 2024-05-26 RX ADMIN — TROSPIUM CHLORIDE 20 MG: 20 TABLET, FILM COATED ORAL at 06:20

## 2024-05-26 RX ADMIN — Medication 5 MG: at 19:53

## 2024-05-26 RX ADMIN — HYDROCODONE BITARTRATE AND ACETAMINOPHEN 2 TABLET: 5; 325 TABLET ORAL at 06:20

## 2024-05-26 RX ADMIN — ENOXAPARIN SODIUM 40 MG: 100 INJECTION SUBCUTANEOUS at 08:23

## 2024-05-26 RX ADMIN — HYDROCODONE BITARTRATE AND ACETAMINOPHEN 2 TABLET: 5; 325 TABLET ORAL at 18:37

## 2024-05-26 RX ADMIN — MIDODRINE HYDROCHLORIDE 5 MG: 5 TABLET ORAL at 12:40

## 2024-05-26 RX ADMIN — HYDROCODONE BITARTRATE AND ACETAMINOPHEN 2 TABLET: 5; 325 TABLET ORAL at 12:36

## 2024-05-26 ASSESSMENT — PAIN DESCRIPTION - DESCRIPTORS
DESCRIPTORS: ACHING
DESCRIPTORS: ACHING
DESCRIPTORS: ACHING;DISCOMFORT;THROBBING

## 2024-05-26 ASSESSMENT — PAIN DESCRIPTION - ORIENTATION
ORIENTATION: MID;LOWER
ORIENTATION: LEFT
ORIENTATION: LEFT

## 2024-05-26 ASSESSMENT — PAIN - FUNCTIONAL ASSESSMENT: PAIN_FUNCTIONAL_ASSESSMENT: ACTIVITIES ARE NOT PREVENTED

## 2024-05-26 ASSESSMENT — PAIN DESCRIPTION - LOCATION
LOCATION: BACK
LOCATION: LEG
LOCATION: LEG

## 2024-05-26 ASSESSMENT — PAIN SCALES - GENERAL
PAINLEVEL_OUTOF10: 6
PAINLEVEL_OUTOF10: 7
PAINLEVEL_OUTOF10: 7
PAINLEVEL_OUTOF10: 10
PAINLEVEL_OUTOF10: 7

## 2024-05-26 ASSESSMENT — PAIN DESCRIPTION - PAIN TYPE: TYPE: CHRONIC PAIN;SURGICAL PAIN

## 2024-05-26 ASSESSMENT — PAIN DESCRIPTION - ONSET: ONSET: ON-GOING

## 2024-05-26 ASSESSMENT — PAIN DESCRIPTION - FREQUENCY: FREQUENCY: CONTINUOUS

## 2024-05-26 NOTE — PROGRESS NOTES
Radha Malin  5/26/2024  [id number]    Chief Complaint: Tibia/fibula fracture, shaft, left, closed, with routine healing, subsequent encounter    Subjective:   Seen in her room this morning.  She is sleeping in bed and was tired from therapy yesterday.  No new complaints overnight.  She continues to want to go to a SNF after discharge from the ARU.    ROS: denies f/c, n/v, cp, sob    Objective:  Patient Vitals for the past 24 hrs:   BP Temp Temp src Pulse Resp SpO2 Weight   05/26/24 1230 124/67 -- -- 95 -- -- --   05/26/24 0821 104/69 98.2 °F (36.8 °C) Oral 85 18 96 % --   05/26/24 0620 -- -- -- -- 16 -- --   05/26/24 0602 -- -- -- -- -- -- 64.1 kg (141 lb 6.4 oz)   05/26/24 0032 -- -- -- -- 16 -- --   05/25/24 2020 120/76 98.8 °F (37.1 °C) Oral 99 16 -- --       Gen: No distress, pleasant.   HEENT: Normocephalic, atraumatic.   CV: extremities well perfused   Resp: No respiratory distress. On room air  Abd: Soft, nondistended  Ext: LLE wrapped in ACE  Neuro: Alert, oriented, appropriately interactive.   Psych: appropriate mood and affect    Wt Readings from Last 3 Encounters:   05/26/24 64.1 kg (141 lb 6.4 oz)   05/07/24 64 kg (141 lb)   06/12/23 62.6 kg (138 lb)       Laboratory data:   Lab Results   Component Value Date    WBC 5.9 05/23/2024    HGB 8.0 (L) 05/23/2024    HCT 25.4 (L) 05/23/2024    MCV 87.9 05/23/2024     (H) 05/23/2024       Lab Results   Component Value Date/Time     05/23/2024 05:14 AM    K 4.3 05/23/2024 05:14 AM     05/23/2024 05:14 AM    CO2 26 05/23/2024 05:14 AM    BUN 12 05/23/2024 05:14 AM    CREATININE 0.6 05/23/2024 05:14 AM    GLUCOSE 79 05/23/2024 05:14 AM    CALCIUM 9.4 05/23/2024 05:14 AM        Therapy progress:  Physical therapy:  Bed Mobility:  Overall Assistance Level: Independent (sit <> long sit position, MOD I)  Sit>supine:  Assistance Level: Modified independent  Skilled Clinical Factors: HOB slightly elevated, use of BR  Supine>sit:  Assistance Level:

## 2024-05-26 NOTE — PLAN OF CARE
Patient able to use pain rating scale 0/10 adequately without problems.  Pain medications explained along with frequency and when to notify the nurse with  possible side effects. Verbalizes understanding. Call light within reach. Resting quietly in bed. Denies needs at present.

## 2024-05-27 PROCEDURE — 6370000000 HC RX 637 (ALT 250 FOR IP): Performed by: PHYSICAL MEDICINE & REHABILITATION

## 2024-05-27 PROCEDURE — 6370000000 HC RX 637 (ALT 250 FOR IP): Performed by: STUDENT IN AN ORGANIZED HEALTH CARE EDUCATION/TRAINING PROGRAM

## 2024-05-27 PROCEDURE — 6360000002 HC RX W HCPCS: Performed by: STUDENT IN AN ORGANIZED HEALTH CARE EDUCATION/TRAINING PROGRAM

## 2024-05-27 PROCEDURE — 97530 THERAPEUTIC ACTIVITIES: CPT

## 2024-05-27 PROCEDURE — 1280000000 HC REHAB R&B

## 2024-05-27 PROCEDURE — 97535 SELF CARE MNGMENT TRAINING: CPT

## 2024-05-27 PROCEDURE — 97130 THER IVNTJ EA ADDL 15 MIN: CPT

## 2024-05-27 PROCEDURE — 97542 WHEELCHAIR MNGMENT TRAINING: CPT

## 2024-05-27 PROCEDURE — 97129 THER IVNTJ 1ST 15 MIN: CPT

## 2024-05-27 PROCEDURE — 97110 THERAPEUTIC EXERCISES: CPT

## 2024-05-27 RX ADMIN — HYDROCODONE BITARTRATE AND ACETAMINOPHEN 2 TABLET: 5; 325 TABLET ORAL at 12:41

## 2024-05-27 RX ADMIN — DULOXETINE HYDROCHLORIDE 20 MG: 20 CAPSULE, DELAYED RELEASE ORAL at 09:12

## 2024-05-27 RX ADMIN — PANTOPRAZOLE SODIUM 40 MG: 40 TABLET, DELAYED RELEASE ORAL at 06:17

## 2024-05-27 RX ADMIN — AMITRIPTYLINE HYDROCHLORIDE 25 MG: 25 TABLET, FILM COATED ORAL at 19:55

## 2024-05-27 RX ADMIN — MIDODRINE HYDROCHLORIDE 5 MG: 5 TABLET ORAL at 12:41

## 2024-05-27 RX ADMIN — Medication 5 MG: at 19:55

## 2024-05-27 RX ADMIN — TROSPIUM CHLORIDE 20 MG: 20 TABLET, FILM COATED ORAL at 16:36

## 2024-05-27 RX ADMIN — TRAZODONE HYDROCHLORIDE 500 MG: 50 TABLET ORAL at 19:54

## 2024-05-27 RX ADMIN — TIZANIDINE 4 MG: 4 TABLET ORAL at 19:55

## 2024-05-27 RX ADMIN — HYDROCODONE BITARTRATE AND ACETAMINOPHEN 2 TABLET: 5; 325 TABLET ORAL at 18:13

## 2024-05-27 RX ADMIN — DIPHENHYDRAMINE HYDROCHLORIDE 25 MG: 25 TABLET ORAL at 00:21

## 2024-05-27 RX ADMIN — HYDROCODONE BITARTRATE AND ACETAMINOPHEN 2 TABLET: 5; 325 TABLET ORAL at 06:17

## 2024-05-27 RX ADMIN — HYDROCODONE BITARTRATE AND ACETAMINOPHEN 2 TABLET: 5; 325 TABLET ORAL at 00:21

## 2024-05-27 RX ADMIN — MIDODRINE HYDROCHLORIDE 5 MG: 5 TABLET ORAL at 16:36

## 2024-05-27 RX ADMIN — MORPHINE SULFATE 15 MG: 15 TABLET, FILM COATED, EXTENDED RELEASE ORAL at 09:10

## 2024-05-27 RX ADMIN — ENOXAPARIN SODIUM 40 MG: 100 INJECTION SUBCUTANEOUS at 09:10

## 2024-05-27 RX ADMIN — MORPHINE SULFATE 15 MG: 15 TABLET, FILM COATED, EXTENDED RELEASE ORAL at 19:55

## 2024-05-27 RX ADMIN — ASPIRIN 81 MG: 81 TABLET, COATED ORAL at 09:10

## 2024-05-27 RX ADMIN — MIDODRINE HYDROCHLORIDE 5 MG: 5 TABLET ORAL at 09:10

## 2024-05-27 RX ADMIN — TROSPIUM CHLORIDE 20 MG: 20 TABLET, FILM COATED ORAL at 06:17

## 2024-05-27 ASSESSMENT — PAIN DESCRIPTION - LOCATION
LOCATION: BUTTOCKS
LOCATION: LEG
LOCATION: LEG;BUTTOCKS
LOCATION: LEG

## 2024-05-27 ASSESSMENT — PAIN SCALES - GENERAL
PAINLEVEL_OUTOF10: 8
PAINLEVEL_OUTOF10: 7
PAINLEVEL_OUTOF10: 8
PAINLEVEL_OUTOF10: 7
PAINLEVEL_OUTOF10: 7
PAINLEVEL_OUTOF10: 5

## 2024-05-27 ASSESSMENT — PAIN DESCRIPTION - ONSET
ONSET: ON-GOING
ONSET: ON-GOING

## 2024-05-27 ASSESSMENT — PAIN DESCRIPTION - DESCRIPTORS
DESCRIPTORS: ACHING;DISCOMFORT
DESCRIPTORS: THROBBING
DESCRIPTORS: THROBBING
DESCRIPTORS: ACHING;DISCOMFORT

## 2024-05-27 ASSESSMENT — PAIN DESCRIPTION - ORIENTATION
ORIENTATION: LEFT

## 2024-05-27 ASSESSMENT — PAIN DESCRIPTION - FREQUENCY
FREQUENCY: CONTINUOUS
FREQUENCY: CONTINUOUS

## 2024-05-27 ASSESSMENT — PAIN DESCRIPTION - PAIN TYPE
TYPE: ACUTE PAIN
TYPE: CHRONIC PAIN;SURGICAL PAIN

## 2024-05-27 ASSESSMENT — PAIN - FUNCTIONAL ASSESSMENT
PAIN_FUNCTIONAL_ASSESSMENT: ACTIVITIES ARE NOT PREVENTED

## 2024-05-27 NOTE — PROGRESS NOTES
Occupational Therapy  Discharge Summary     Name:Radha Malin  MRN:[id number]  :1960  Treatment Diagnosis: impaired functional mobility  Diagnosis: L tib/fib fx d/t fall, s/p L tibia-fibula IMN 24    Restrictions/Precautions  Restrictions/Precautions: Fall Risk, General Precautions, Weight Bearing, Surgical Protocols  Required Braces or Orthoses?: No   Lower Extremity Weight Bearing Restrictions  Right Lower Extremity Weight Bearing: Weight Bearing As Tolerated  Left Lower Extremity Weight Bearing: Toe Touch Weight Bearing       Position Activity Restriction  Sternal Precautions: 10# Lifting Restrictions  Other position/activity restrictions: \"no heat or ice to breasts\" \"if no drains, then ok to shower on POD 2\", \"no heavy lifting > 10 lbs, no strenuous activities, no house chores, no overhead activities\" per Amanda Armendariz RN d/c instructions. OK for ankle and knee range of motion as tolerated. Encourage motion. Toe touch weightbearing for balance only.     Goals:   Short Term Goals  Time Frame for Short Term Goals:   Short Term Goal 1: Pt will perform LB dressing with mod A and AE PRN - goal met   Short Term Goal 2: Pt will perform toilet transfer with CGA- goal met   Short Term Goal 3: Pt will perform footwear with mod A and AE PRN- goal met   Short Term Goal 4: Pt will perform LB bathing with CGA- goal met , seated to wash buttocks with lateral leaning  Long Term Goals  Time Frame for Long Term Goals :   Long Term Goal 1: Pt will perform LB dressing mod I with AE PRN. GOAL MET 24 Pt performed LB dressing with mod I.  Long Term Goal 2: Pt will perform LB bathing mod I. Goal Not Met. Pt continues to require Setup for bagging LLE during bathing.  Long Term Goal 3: Pt will perform toilet transfer mod I, Goal Not Met. Pt continues to require SPV for toilet transfer.  Long Term Goal 4: Pt will perform toileting task mod I. GOAL MET 24 Pt performed toileting with mod

## 2024-05-27 NOTE — PROGRESS NOTES
Radha Malin  5/27/2024  [id number]    Chief Complaint: Tibia/fibula fracture, shaft, left, closed, with routine healing, subsequent encounter    Subjective:   Seen in her room this morning.  She has no new complaints overnight.  She continues to have a little bit of pain in her left leg but overall she is feeling better.  She is tired and rested up yesterday.  She was able to sleep well last night.  She still is on the fence about going to a SNF vs home.    ROS: denies f/c, n/v, cp, sob    Objective:  Patient Vitals for the past 24 hrs:   BP Temp Temp src Pulse Resp SpO2   05/27/24 1241 -- -- -- -- 18 --   05/27/24 1234 115/77 -- -- 84 -- 98 %   05/27/24 0900 111/76 98.1 °F (36.7 °C) Oral 91 16 96 %   05/27/24 0730 133/72 -- -- 88 -- 94 %   05/27/24 0647 -- -- -- -- 16 --   05/27/24 0617 -- -- -- -- 16 --   05/27/24 0051 -- -- -- -- 16 --   05/26/24 2023 -- -- -- -- 16 --   05/26/24 1953 122/84 98.3 °F (36.8 °C) Oral 88 16 94 %       Gen: No distress, pleasant.   HEENT: Normocephalic, atraumatic.   CV: extremities well perfused   Resp: No respiratory distress. On room air  Abd: Soft, nondistended  Ext: LLE wrapped in ACE  Neuro: Alert, oriented, appropriately interactive.   Psych: appropriate mood and affect    Wt Readings from Last 3 Encounters:   05/26/24 64.1 kg (141 lb 6.4 oz)   05/07/24 64 kg (141 lb)   06/12/23 62.6 kg (138 lb)       Laboratory data:   Lab Results   Component Value Date    WBC 5.9 05/23/2024    HGB 8.0 (L) 05/23/2024    HCT 25.4 (L) 05/23/2024    MCV 87.9 05/23/2024     (H) 05/23/2024       Lab Results   Component Value Date/Time     05/23/2024 05:14 AM    K 4.3 05/23/2024 05:14 AM     05/23/2024 05:14 AM    CO2 26 05/23/2024 05:14 AM    BUN 12 05/23/2024 05:14 AM    CREATININE 0.6 05/23/2024 05:14 AM    GLUCOSE 79 05/23/2024 05:14 AM    CALCIUM 9.4 05/23/2024 05:14 AM        Therapy progress:  Physical therapy:  Bed Mobility:  Overall Assistance Level: Independent (sit

## 2024-05-27 NOTE — PLAN OF CARE
Problem: Safety - Adult  Goal: Free from fall injury  5/27/2024 0919 by Patience Howe, RN  Outcome: Progressing  5/26/2024 2010 by Isa Sahni, RN  Outcome: Progressing     Problem: Skin/Tissue Integrity  Goal: Absence of new skin breakdown  Description: 1.  Monitor for areas of redness and/or skin breakdown  5/26/2024 2010 by Isa Sahni, RN  Outcome: Progressing

## 2024-05-27 NOTE — PROGRESS NOTES
Goal 4: Pt will perform chair to bed transfers with RW and Mod I. - NOT MET 5/27, supv with RW - requires cues for WB, safety, and sequencing    Pt. Met 3/4 short term goals and 1/4 long term goals.     Pt. Currently ambulates 5 feet with CGA and in parallel bars   Unable to perform steps   Sit to/from stand with supv   Bed mobility with mod-ind using bed rails   Recommend SNF in order to continue therapy progressions prior to returning home and due to spouse working/lack of available help at home    Provided pt. with education regarding d/c plans, transfer safety and tips, DME needs and process if going to SNF vs home     Electronically signed by Joe Duvall PT on 5/27/2024 at 2:55 PM

## 2024-05-27 NOTE — PROGRESS NOTES
Occupational Therapy  Facility/Department: [facility]  Rehabilitation Occupational Therapy Daily Treatment Note    Date: 24  Patient Name: Radha Malin       Room: 0170/0170-01  MRN: [id number]  Account: [id number]   : 1960  (63 y.o.) Gender: female         Pt was bathed during OT session this date. Pt was Showered with soap/water with Setup.             Past Medical History:  has a past medical history of ADHD (attention deficit hyperactivity disorder), Arthritis, Asthma, Cancer (HCC), History of blood transfusion, Postural hypotension, Recurrent sinus infections, Wears glasses, and Wears partial dentures.  Past Surgical History:   has a past surgical history that includes Dental surgery; Knee arthroscopy; Carpal tunnel release; Nose surgery (Left); Tubal ligation; Ankle fracture surgery (Right, 2014); fracture surgery (Left); Lung removal, partial (Right); Ankle surgery (Right, 2023); and Tibia fracture surgery (Left, 2024).    Restrictions  Restrictions/Precautions: Fall Risk, General Precautions, Weight Bearing, Surgical Protocols  Other position/activity restrictions: \"no heat or ice to breasts\" \"if no drains, then ok to shower on POD 2\", \"no heavy lifting > 10 lbs, no strenuous activities, no house chores, no overhead activities\" per Amanda Armendariz RN d/c instructions. OK for ankle and knee range of motion as tolerated. Encourage motion. Toe touch weightbearing for balance only.  Right Lower Extremity Weight Bearing: Weight Bearing As Tolerated  Left Lower Extremity Weight Bearing: Toe Touch Weight Bearing  Required Braces or Orthoses?: No    Subjective  Subjective: Pt in bed, pleasant, pain 5/10 in back, on arrival, aching, able to continue with therapy with repositioning and shower offered, agreeable to OT session, /72, HR 88, O2 sats 94% on RA.  Restrictions/Precautions: Fall Risk;General Precautions;Weight Bearing;Surgical Protocols             Objective

## 2024-05-27 NOTE — PROGRESS NOTES
[facility]: ACUTE REHAB UNIT  SPEECH-LANGUAGE PATHOLOGY      [x] Daily  [] Weekly Care Conference Note  [] Discharge    Patient:Radha Malin      :1960  MRN:[id number]  Rehab Dx/Hx: Tibia/fibula fracture, shaft, left, closed, with routine healing, subsequent encounter [S82.202D, S82.402D]    Precautions: falls  Home situation: lives with partner. Indep completes laundry. Otherwise, shares responsibility for med management, finances, and other household tasks with partner.   ST Dx: [] Aphasia  [] Dysarthria  [] Apraxia   [] Oropharyngeal dysphagia [x] Cognitive Impairment  [] Other:   Date of Admit: 2024  Room #: 0170/0170-01    Current functional status (updated daily):         Pt being seen for : [] Speech/Language Treatment  [] Dysphagia Treatment [x] Cognitive Treatment  [] Other:  Communication: [x]WFL  [] Aphasia  [] Dysarthria  [] Apraxia  [] Pragmatic Impairment [] Non-verbal  [] Hearing Loss  [] Other:   Cognition: [] WFL  [x] Mild  [] Moderate  [] Severe [] Unable to Assess  [] Other:  Memory: [x] WFL  [] Mild  [] Moderate  [] Severe [] Unable to Assess  [] Other:  Behavior: [x] Alert  [x] Cooperative  [x]  Pleasant  [] Confused  [] Agitated  [] Uncooperative  [] Distractible [] Motivated  [] Self-Limiting [x] Anxious  [] Other:  Endurance:  [x] Adequate for participation in SLP sessions  [] Reduced overall  [] Lethargic  [] Other:  Safety: [] No concerns at this time  [x] Reduced insight into deficits  []  Reduced safety awareness [] Not following call light procedures  [] Unable to Assess  [] Other:    Current Diet Order:ADULT DIET; Regular  ADULT ORAL NUTRITION SUPPLEMENT; Breakfast, Dinner; Standard High Calorie/High Protein Oral Supplement  Swallowing Precautions: Sit up for all meals and thereafter for 30 minutes      Date: 2024      Tx session 1  [id number] DISCHARGE SUMMARY   Total Timed Code Min 60    Total Treatment Minutes 60    Individual Treatment Minutes 60    Group Treatment  No - the patient is unable to be screened due to medical condition

## 2024-05-27 NOTE — PROGRESS NOTES
Physical Therapy  Facility/Department: [facility]  Rehabilitation Physical Therapy Treatment Note    NAME: Radha Malin  : 1960 (63 y.o.)  MRN: [id number]  CODE STATUS: Full Code    Date of Service: 24       Restrictions:  Restrictions/Precautions: Fall Risk, General Precautions, Weight Bearing, Surgical Protocols  Lower Extremity Weight Bearing Restrictions  Right Lower Extremity Weight Bearing: Weight Bearing As Tolerated  Left Lower Extremity Weight Bearing: Toe Touch Weight Bearing  Position Activity Restriction  Sternal Precautions: 10# Lifting Restrictions  Other position/activity restrictions: \"no heat or ice to breasts\" \"if no drains, then ok to shower on POD 2\", \"no heavy lifting > 10 lbs, no strenuous activities, no house chores, no overhead activities\" per Amanda Armendariz RN d/c instructions. OK for ankle and knee range of motion as tolerated. Encourage motion. Toe touch weightbearing for balance only.     SUBJECTIVE  Subjective  Subjective: pt found in chair, agreeable to therapy  Pain: reports 6/10 pain in LLE          OBJECTIVE  Cognition  Overall Cognitive Status: Exceptions  Arousal/Alertness: Appropriate responses to stimuli  Following Commands: Follows multistep commands with repitition  Attention Span: Appears intact  Memory: Appears intact  Safety Judgement: Decreased awareness of need for assistance;Decreased awareness of need for safety  Problem Solving: Assistance required to generate solutions;Assistance required to implement solutions  Insights: Decreased awareness of deficits  Initiation: Requires cues for some  Sequencing: Requires cues for some  Orientation  Overall Orientation Status: Within Functional Limits    Functional Mobility  Bed Mobility  Overall Assistance Level: Independent  Roll Left  Assistance Level: Modified independent  Skilled Clinical Factors: with bed rails  Roll Right  Assistance Level: Modified independent  Skilled Clinical Factors: with bed rails  Sit

## 2024-05-28 VITALS
OXYGEN SATURATION: 95 % | HEART RATE: 97 BPM | RESPIRATION RATE: 18 BRPM | HEIGHT: 62 IN | TEMPERATURE: 98.1 F | BODY MASS INDEX: 26.02 KG/M2 | WEIGHT: 141.4 LBS | DIASTOLIC BLOOD PRESSURE: 82 MMHG | SYSTOLIC BLOOD PRESSURE: 127 MMHG

## 2024-05-28 PROCEDURE — 6370000000 HC RX 637 (ALT 250 FOR IP): Performed by: PHYSICAL MEDICINE & REHABILITATION

## 2024-05-28 PROCEDURE — 6370000000 HC RX 637 (ALT 250 FOR IP): Performed by: STUDENT IN AN ORGANIZED HEALTH CARE EDUCATION/TRAINING PROGRAM

## 2024-05-28 PROCEDURE — 6360000002 HC RX W HCPCS: Performed by: STUDENT IN AN ORGANIZED HEALTH CARE EDUCATION/TRAINING PROGRAM

## 2024-05-28 RX ORDER — DULOXETIN HYDROCHLORIDE 60 MG/1
60 CAPSULE, DELAYED RELEASE ORAL DAILY
Status: ON HOLD | COMMUNITY
End: 2024-05-28

## 2024-05-28 RX ORDER — PANTOPRAZOLE SODIUM 40 MG/1
40 TABLET, DELAYED RELEASE ORAL DAILY
COMMUNITY

## 2024-05-28 RX ORDER — MORPHINE SULFATE 15 MG/1
15 TABLET, FILM COATED, EXTENDED RELEASE ORAL EVERY 12 HOURS SCHEDULED
Qty: 14 TABLET | Refills: 0 | Status: SHIPPED | OUTPATIENT
Start: 2024-05-28 | End: 2024-06-04

## 2024-05-28 RX ORDER — TIZANIDINE 4 MG/1
4 TABLET ORAL EVERY 6 HOURS PRN
Qty: 40 TABLET | Refills: 0 | Status: SHIPPED | OUTPATIENT
Start: 2024-05-28

## 2024-05-28 RX ORDER — AMITRIPTYLINE HYDROCHLORIDE 25 MG/1
25 TABLET, FILM COATED ORAL NIGHTLY
Qty: 30 TABLET | Refills: 0 | Status: SHIPPED | OUTPATIENT
Start: 2024-05-28

## 2024-05-28 RX ORDER — HYDROCODONE BITARTRATE AND ACETAMINOPHEN 10; 325 MG/1; MG/1
1 TABLET ORAL EVERY 6 HOURS PRN
Qty: 28 TABLET | Refills: 0 | Status: SHIPPED | OUTPATIENT
Start: 2024-05-28 | End: 2024-06-04

## 2024-05-28 RX ORDER — ENOXAPARIN SODIUM 100 MG/ML
40 INJECTION SUBCUTANEOUS DAILY
Qty: 6 ML | Refills: 0 | Status: SHIPPED | OUTPATIENT
Start: 2024-05-29 | End: 2024-06-13

## 2024-05-28 RX ORDER — DULOXETIN HYDROCHLORIDE 20 MG/1
20 CAPSULE, DELAYED RELEASE ORAL DAILY
Qty: 30 CAPSULE | Refills: 0 | Status: SHIPPED | OUTPATIENT
Start: 2024-05-28

## 2024-05-28 RX ORDER — MIDODRINE HYDROCHLORIDE 5 MG/1
5 TABLET ORAL
Qty: 90 TABLET | Refills: 0 | Status: SHIPPED | OUTPATIENT
Start: 2024-05-28

## 2024-05-28 RX ORDER — NALOXONE HYDROCHLORIDE 4 MG/.1ML
1 SPRAY NASAL PRN
Qty: 1 EACH | Refills: 0 | Status: SHIPPED | OUTPATIENT
Start: 2024-05-28

## 2024-05-28 RX ORDER — BUSPIRONE HYDROCHLORIDE 15 MG/1
15 TABLET ORAL 2 TIMES DAILY
Status: ON HOLD | COMMUNITY
End: 2024-05-28

## 2024-05-28 RX ADMIN — MIDODRINE HYDROCHLORIDE 5 MG: 5 TABLET ORAL at 12:15

## 2024-05-28 RX ADMIN — ENOXAPARIN SODIUM 40 MG: 100 INJECTION SUBCUTANEOUS at 08:19

## 2024-05-28 RX ADMIN — HYDROCODONE BITARTRATE AND ACETAMINOPHEN 2 TABLET: 5; 325 TABLET ORAL at 00:11

## 2024-05-28 RX ADMIN — DULOXETINE HYDROCHLORIDE 20 MG: 20 CAPSULE, DELAYED RELEASE ORAL at 08:20

## 2024-05-28 RX ADMIN — MORPHINE SULFATE 15 MG: 15 TABLET, FILM COATED, EXTENDED RELEASE ORAL at 08:20

## 2024-05-28 RX ADMIN — PANTOPRAZOLE SODIUM 40 MG: 40 TABLET, DELAYED RELEASE ORAL at 05:59

## 2024-05-28 RX ADMIN — HYDROCODONE BITARTRATE AND ACETAMINOPHEN 2 TABLET: 5; 325 TABLET ORAL at 12:14

## 2024-05-28 RX ADMIN — TIZANIDINE 4 MG: 4 TABLET ORAL at 12:15

## 2024-05-28 RX ADMIN — DIPHENHYDRAMINE HYDROCHLORIDE 25 MG: 25 TABLET ORAL at 00:11

## 2024-05-28 RX ADMIN — ASPIRIN 81 MG: 81 TABLET, COATED ORAL at 08:20

## 2024-05-28 RX ADMIN — TROSPIUM CHLORIDE 20 MG: 20 TABLET, FILM COATED ORAL at 05:59

## 2024-05-28 RX ADMIN — HYDROCODONE BITARTRATE AND ACETAMINOPHEN 2 TABLET: 5; 325 TABLET ORAL at 05:59

## 2024-05-28 RX ADMIN — MIDODRINE HYDROCHLORIDE 5 MG: 5 TABLET ORAL at 08:20

## 2024-05-28 ASSESSMENT — PAIN SCALES - GENERAL
PAINLEVEL_OUTOF10: 5
PAINLEVEL_OUTOF10: 5
PAINLEVEL_OUTOF10: 8
PAINLEVEL_OUTOF10: 7
PAINLEVEL_OUTOF10: 5

## 2024-05-28 ASSESSMENT — PAIN DESCRIPTION - FREQUENCY: FREQUENCY: CONTINUOUS

## 2024-05-28 ASSESSMENT — PAIN DESCRIPTION - LOCATION
LOCATION: LEG
LOCATION: BUTTOCKS;LEG
LOCATION: LEG

## 2024-05-28 ASSESSMENT — PAIN DESCRIPTION - DESCRIPTORS
DESCRIPTORS: ACHING;DISCOMFORT

## 2024-05-28 ASSESSMENT — PAIN DESCRIPTION - ONSET: ONSET: ON-GOING

## 2024-05-28 ASSESSMENT — PAIN DESCRIPTION - ORIENTATION
ORIENTATION: LEFT

## 2024-05-28 ASSESSMENT — PAIN - FUNCTIONAL ASSESSMENT: PAIN_FUNCTIONAL_ASSESSMENT: ACTIVITIES ARE NOT PREVENTED

## 2024-05-28 ASSESSMENT — PAIN DESCRIPTION - PAIN TYPE: TYPE: CHRONIC PAIN

## 2024-05-28 ASSESSMENT — PAIN SCALES - WONG BAKER: WONGBAKER_NUMERICALRESPONSE: NO HURT

## 2024-05-28 NOTE — CARE COORDINATION
STEPHANY received a message that the precert was denied and a P2P offered and has to be completed by 2;50pm today contact # [phone]. 1208 STEPHANY updated MD on the P2P offered by insurance for SNF stay. MD stated that she will complete now, contact info provided as above, awaiting outcome.    1212 MD notified writer P2P denied d/t doing to well. Writer will update patient and family.    Patience Diallo RN

## 2024-05-28 NOTE — DISCHARGE INSTR - COC
Continuity of Care Form    Patient Name: Radha Malin   :  1960  MRN:  [id number]    Admit date:  2024  Discharge date:  24    Code Status Order: Full Code   Advance Directives:     Admitting Physician:  Shannan Alan MD  PCP: Terrell Perez DO    Discharging Nurse: Beto NINA  Discharging Hospital Unit/Room#: 0170/0170-01  Discharging Unit Phone Number: [phone]    Emergency Contact:   Extended Emergency Contact Information  Primary Emergency Contact: Matthew Mahmood  Address: [address] [place] [address]  Home Phone: [phone]  Mobile Phone: [phone]  Relation: Domestic Partner  Secondary Emergency Contact: Bibi Lou  Home Phone: [phone]  Mobile Phone: [phone]  Relation: Brother/Sister    Past Surgical History:  Past Surgical History:   Procedure Laterality Date    ANKLE FRACTURE SURGERY Right 2014    ANKLE SURGERY Right 2023    RIGHT FIBULA HARDWARE (PLATE) REMOVAL AND PRODENSE INSERTION performed by Clarence Salazar MD at [facility] OR    CARPAL TUNNEL RELEASE      right    DENTAL SURGERY      implants    FRACTURE SURGERY Left     foot X 2    KNEE ARTHROSCOPY      left    LUNG REMOVAL, PARTIAL Right     NOSE SURGERY Left     left nasal valve reconstruction-total of 3 surgeries    TIBIA FRACTURE SURGERY Left 2024    TIBIA INTRAMEDULLARY NAIL INSERTION performed by Vasile Antunez MD at [facility] OR    TUBAL LIGATION         Immunization History:   Immunization History   Administered Date(s) Administered    COVID-19, PFIZER PURPLE top, DILUTE for use, (age 12 y+), 30mcg/0.3mL 2021, 2021       Active Problems:  Patient Active Problem List   Diagnosis Code    Pilon fracture S82.873A    Arthritis of ankle joint M19.079    Community acquired bacterial pneumonia J15.9    Reactive airway disease J45.909    History of lung cancer Z85.118    Chronic pain of right lower extremity M79.604, G89.29    Tobacco use

## 2024-05-28 NOTE — PLAN OF CARE
Problem: Safety - Adult  Goal: Free from fall injury  5/28/2024 0726 by Beto Adams RN  Outcome: Progressing  5/27/2024 2000 by Isa Sahni RN  Outcome: Progressing     Problem: Pain  Goal: Verbalizes/displays adequate comfort level or baseline comfort level  5/28/2024 0726 by Beto dAams RN  Outcome: Progressing  5/27/2024 2000 by Isa Sahni RN  Outcome: Progressing  Flowsheets (Taken 5/27/2024 0900 by Patience Howe, RN)  Verbalizes/displays adequate comfort level or baseline comfort level:   Assess pain using appropriate pain scale   Encourage patient to monitor pain and request assistance   Administer analgesics based on type and severity of pain and evaluate response

## 2024-05-28 NOTE — CARE COORDINATION
Case Management Discharge Summary  [facility] - Acute Rehab Unit    Patient:Radha Malin     Rehab Dx/Hx: Tibia/fibula fracture, shaft, left, closed, with routine healing, subsequent encounter [S82.202D, S82.402D]       Today's Date: 5/28/2024    Discharge to:  [facility] cneter: private pay   Pre-certification completed:  [] No       [x] Yes>denied    Hospital Exemption Notification (HENS) completed:  [] No       [] Yes     [x] N/A   IMM given:  05/28/24       New Durable Medical Equipment ordered/agency:  Defer to snf   Transportation:  Private:family       Confirmed discharge plan with:  Patient: [] No       [x] Yes  Family:  [] No       [x] Yes      Name: Matthew Mahmood (Domestic Partner)  Contact number: [phone]  Facility/Agency, name:    [facility]  Skilled Nursing  [address] [facility] [address]  [phone]   REBEKAH/AVS faxed    Phone number for report to facility: [phone]  RN, name: memo       Has lack of transportation kept you from medical appointments, meetings, work, or ADL's? [] Yes, it has kept me from medical appointments or from getting my meds  [] Yes, It has kept me from non-medical meetings, appointments, work, or getting things I need  [x] No  [] Pt unable to respond  [] Pt declines to respond     How often do you need to have someone help you when you read instructions, pamphlets, or other written material from your doctor or pharmacy? [] Never                             [] Always  [x] Rarely                            [] Patient declines to respond  [] Sometimes                    [] Patient unable to respond  [] Often       Note: Discharging nurse to complete REBEKAH, reconcile AVS, and place final copy with patient's discharge packet. RN to ensure that written prescriptions for  Level II medications are sent with patient to the facility as per protocol.          Signature:  Patience Diallo RN

## 2024-05-28 NOTE — PROGRESS NOTES
A10. Other []   B1. Radiation []   Respiratory Therapies    C1. Oxygen Therapy []   C2. Continuous []   C3. Intermittent []   C4. High-concentration []   D1. Suctioning []   D2. Scheduled []   D3. As needed []   E1. Tracheostomy Care []   F1. Invasive Mechanical Ventilator (ventilator or respirator) []   G1. Non-invasive Mechanical Ventilator []   G2. BiPAP []   G3. CPAP []   Other    H1. IV Medications []   H2. Vasoactive medications []   H3. Antibiotics []   H4. Anticoagulation []   H10. Other []   I1. Transfusions []   J1. Dialysis []   J2. Hemodialysis []   J3. Peritoneal dialysis []   O1. IV access []   O2. Peripheral []   O3. Midline []   O4. Central (PICC, tunneled, port) []   None of the above [x]     Signature:  Beto Adams RN

## 2024-05-28 NOTE — CARE COORDINATION
CM spoke to patient and family face to face in patients room. Family had many questions relating to the process of insurance approval or denial for SNF as well as transport and dc time. Writer explained. Percert to [facility] still pending at this time. Writer will update patient and family when insurance has made there decision.    Patience Diallo RN

## 2024-05-28 NOTE — PLAN OF CARE
Problem: Safety - Adult  Goal: Free from fall injury  5/27/2024 2000 by Isa Sahni, RN  Outcome: Progressing  5/27/2024 0919 by Patience Howe RN  Outcome: Progressing     Problem: Pain  Goal: Verbalizes/displays adequate comfort level or baseline comfort level  Outcome: Progressing  Flowsheets (Taken 5/27/2024 0900 by Patience Howe, RN)  Verbalizes/displays adequate comfort level or baseline comfort level:   Assess pain using appropriate pain scale   Encourage patient to monitor pain and request assistance   Administer analgesics based on type and severity of pain and evaluate response     Problem: Skin/Tissue Integrity  Goal: Absence of new skin breakdown  Description: 1.  Monitor for areas of redness and/or skin breakdown  2.  Assess vascular access sites hourly  3.  Every 4-6 hours minimum:  Change oxygen saturation probe site  4.  Every 4-6 hours:  If on nasal continuous positive airway pressure, respiratory therapy assess nares and determine need for appliance change or resting period.  Outcome: Progressing

## 2024-05-28 NOTE — DISCHARGE SUMMARY
Physical Medicine & Rehabilitation  Discharge Summary     Patient Identification:  Radha Malin  : 1960  Admit date: 2024  Discharge date:  24  Attending provider: Shannan Alan MD        Primary care provider: Terrell Perez DO     Discharge Diagnoses:   Active Hospital Problems    Diagnosis     Tibia/fibula fracture, shaft, left, closed, with routine healing, subsequent encounter [S82.202D, S82.402D]        History of Present Illness/Acute Hospital Course:  Radha Malin is a 63 year old female with a past medical history significant for chronic pain, postural hypotension, asthma, and recent breast reduction surgery who presented to [facility] on 24 with left lower leg pain after a fall at home. She was found to have a left tibia-fibula fracture. Orthopedic Surgery was consulted and on  she underwent left segmental tibia shaft intramedullary nailing. Course notable for acute blood loss anemia. She was admitted to [facility] on 24 due to functional deficits below her baseline. Today Radha reports some continued ankle pain. Reviewed her home medication regimen. She otherwise reports feeling well and is happy to be in rehab.     Inpatient Rehabilitation Course:   Radha Malin is a 63 y.o. female admitted to inpatient rehabilitation on 2024 with Tibia/fibula fracture, shaft, left, closed, with routine healing, subsequent encounter.  The patient participated in an aggressive multidisciplinary inpatient rehabilitation program involving 3 hours of therapy per day, at least 5 days per week. Patient discharging to [facility] private pay.     Impairments: impaired mobility and ADLs, impaired gait and balance    Medical Management:    Left Tibia-Fibula Fracture  - s/p IM nail   - toe touch weight bearing LLE - confirmed this will be for 6 weeks, ROM as tolerated  - lovenox for 30 days  - daily dressing changes  - PT, OT     Acute on Chronic Pain  - on MS contin and norco  mg

## 2024-05-28 NOTE — PROGRESS NOTES
often do you feel lonely or isolated from those around you?  [] 0. Never  [x] 1. Rarely  [] 2. Sometimes  [] 3. Often  [] 4. Always  [] 7. Patient declines to respond  [] 8. Patient unable to respond.           Discharging Nurse Signature:  Beto Adams RN

## 2024-06-05 ENCOUNTER — OFFICE VISIT (OUTPATIENT)
Dept: ORTHOPEDIC SURGERY | Age: 64
End: 2024-06-05
Payer: MEDICARE

## 2024-06-05 VITALS — HEIGHT: 62 IN | WEIGHT: 141 LBS | BODY MASS INDEX: 25.95 KG/M2

## 2024-06-05 DIAGNOSIS — M84.68XA PATHOLOGICAL FRACTURE IN OTHER DISEASE, OTHER SITE, INITIAL ENCOUNTER FOR FRACTURE: ICD-10-CM

## 2024-06-05 DIAGNOSIS — S82.402D TIBIA/FIBULA FRACTURE, SHAFT, LEFT, CLOSED, WITH ROUTINE HEALING, SUBSEQUENT ENCOUNTER: Primary | ICD-10-CM

## 2024-06-05 DIAGNOSIS — S82.202D TIBIA/FIBULA FRACTURE, SHAFT, LEFT, CLOSED, WITH ROUTINE HEALING, SUBSEQUENT ENCOUNTER: Primary | ICD-10-CM

## 2024-06-05 PROCEDURE — L4361 PNEUMA/VAC WALK BOOT PRE OTS: HCPCS | Performed by: ORTHOPAEDIC SURGERY

## 2024-06-05 PROCEDURE — 99024 POSTOP FOLLOW-UP VISIT: CPT | Performed by: ORTHOPAEDIC SURGERY

## 2024-06-05 NOTE — PROGRESS NOTES
ORTHOPAEDIC SURGERY FOLLOWUP VISIT    CHIEF COMPLAINT:  Left tibia/fibula fractures    DATE OF INJURY: 5/7/2024  DIAGNOSIS: Left segmental tibia/fibular shaft fractures  DATE OF SURGERY: 5/8/2024, left tibia intramedullary nailing    HISTORY OF PRESENT ILLNESS:  63-year-old female presents for repeat evaluation 4 weeks status post intramedullary nailing of a segmental left tibia/fibular shaft fractures.  Overall she has done well.  Her pain is dissipating.  She continues to have pain rated up to 4 out of 10 in the mid tibia.  She is currently undergoing inpatient rehab.  She does have pre-existing issue/weakness involving the contralateral side which has limited her mobility to this time point.  She denies fever, chills, night sweats.  No wound healing problems.  She has maintained her knee and ankle motion well.    PHYSICAL EXAM:  General: Well-appearing.  No distress.  Presents in wheelchair.  Left lower extremity: Incisional sites pristinely approximated with nylon sutures.  There is mature healing.  No signs of dehiscence.  No open areas.  No surrounding erythema.  Minimal bruising.  Ankle range of motion is 10 degrees dorsiflexion to 50 degrees plantarflexion without difficulty.  There is trace lower extremity edema.  Knee range of motion is full extension to 130 degrees of flexion without difficulty.  There is tenderness to palpation about the midshaft tibia as well as anterolateral ankle along the course of the ATFL.  Sensation is intact to light touch in deep peroneal, superficial peroneal, tibial, sural, and saphenous nerve distributions.  Motor function is intact to EHL, FHL, tibialis anterior, and gastroc.  There is brisk capillary refill to the toes and a strong palpable dorsalis pedis pulse.  Compartments are soft and compressible.  There is no calf tenderness and a negative Homans' sign.    RADIOGRAPHIC EXAM:  3 views of the left tibia/fibula demonstrate maintained position of intramedullary implant

## 2024-07-03 ENCOUNTER — OFFICE VISIT (OUTPATIENT)
Dept: ORTHOPEDIC SURGERY | Age: 64
End: 2024-07-03

## 2024-07-03 VITALS — WEIGHT: 141 LBS | BODY MASS INDEX: 25.95 KG/M2 | HEIGHT: 62 IN

## 2024-07-03 DIAGNOSIS — S82.202D TIBIA/FIBULA FRACTURE, SHAFT, LEFT, CLOSED, WITH ROUTINE HEALING, SUBSEQUENT ENCOUNTER: Primary | ICD-10-CM

## 2024-07-03 DIAGNOSIS — S82.402D TIBIA/FIBULA FRACTURE, SHAFT, LEFT, CLOSED, WITH ROUTINE HEALING, SUBSEQUENT ENCOUNTER: Primary | ICD-10-CM

## 2024-07-03 PROCEDURE — 99024 POSTOP FOLLOW-UP VISIT: CPT | Performed by: ORTHOPAEDIC SURGERY

## 2024-07-03 NOTE — PROGRESS NOTES
ORTHOPAEDIC SURGERY FOLLOWUP VISIT     CHIEF COMPLAINT:  Left tibia/fibula fractures     DATE OF INJURY: 5/7/2024  DIAGNOSIS: Left segmental tibia/fibular shaft fractures  DATE OF SURGERY: 5/8/2024, left tibia intramedullary nailing     HISTORY OF PRESENT ILLNESS:  63-year-old female presents for repeat evaluation 8 weeks status post intramedullary nailing of a segmental left tibia/fibular shaft fractures.  Overall she has done well.  Her pain is dissipating.  She continues to have pain rated up to 4 out of 10.  Her pain is primarily over the anterior ankle/top of the foot with weightbearing.  She still endorses tenderness overlying the anterior midshaft tibia, but no pain with weightbearing.  She has been ambulating in the high tide Cam boot.  She does have pre-existing issue/weakness involving the contralateral side which has limited her mobility to this time point.  She denies fever, chills, night sweats.  No wound healing problems.  She has maintained her knee and ankle motion well.  She has attempted to ambulate without the cam boot and has not had any adverse troubles.  She feels ready to wean from the boot.     PHYSICAL EXAM:  General: Well-appearing.  No distress.  Presents in wheelchair.  Left lower extremity: Incisional sites pristinely approximated with nylon sutures.  There is mature healing.  No signs of dehiscence.  No open areas.  No surrounding erythema.  Minimal bruising.  Ankle range of motion is 10 degrees dorsiflexion to 50 degrees plantarflexion without difficulty.  There is no significant lower extremity edema.  Knee range of motion is full extension to 130 degrees of flexion without difficulty.  There is mild tenderness to palpation about the midshaft tibia as well as anterolateral ankle along the course of the ATFL.  Sensation is intact to light touch in deep peroneal, superficial peroneal, tibial, sural, and saphenous nerve distributions.  Motor function is intact to EHL, FHL, tibialis

## 2024-07-04 ENCOUNTER — APPOINTMENT (OUTPATIENT)
Dept: CT IMAGING | Age: 64
DRG: 304 | End: 2024-07-04
Payer: MEDICARE

## 2024-07-04 ENCOUNTER — HOSPITAL ENCOUNTER (INPATIENT)
Age: 64
LOS: 2 days | Discharge: HOME | DRG: 304 | End: 2024-07-06
Attending: STUDENT IN AN ORGANIZED HEALTH CARE EDUCATION/TRAINING PROGRAM | Admitting: INTERNAL MEDICINE
Payer: MEDICARE

## 2024-07-04 ENCOUNTER — APPOINTMENT (OUTPATIENT)
Dept: GENERAL RADIOLOGY | Age: 64
DRG: 304 | End: 2024-07-04
Payer: MEDICARE

## 2024-07-04 DIAGNOSIS — R93.0 ABNORMAL HEAD CT: ICD-10-CM

## 2024-07-04 DIAGNOSIS — R51.9 NONINTRACTABLE HEADACHE, UNSPECIFIED CHRONICITY PATTERN, UNSPECIFIED HEADACHE TYPE: Primary | ICD-10-CM

## 2024-07-04 DIAGNOSIS — Z86.79 H/O: HYPERTENSION: ICD-10-CM

## 2024-07-04 DIAGNOSIS — I16.0 HYPERTENSIVE URGENCY: ICD-10-CM

## 2024-07-04 LAB
ALBUMIN SERPL-MCNC: 4.3 G/DL (ref 3.4–5)
ALBUMIN/GLOB SERPL: 1.3 {RATIO} (ref 1.1–2.2)
ALP SERPL-CCNC: 115 U/L (ref 40–129)
ALT SERPL-CCNC: 16 U/L (ref 10–40)
ANION GAP SERPL CALCULATED.3IONS-SCNC: 10 MMOL/L (ref 3–16)
AST SERPL-CCNC: 18 U/L (ref 15–37)
BASOPHILS # BLD: 0.1 K/UL (ref 0–0.2)
BASOPHILS NFR BLD: 1 %
BILIRUB SERPL-MCNC: <0.2 MG/DL (ref 0–1)
BUN SERPL-MCNC: 15 MG/DL (ref 7–20)
CALCIUM SERPL-MCNC: 10.1 MG/DL (ref 8.3–10.6)
CHLORIDE SERPL-SCNC: 101 MMOL/L (ref 99–110)
CO2 SERPL-SCNC: 25 MMOL/L (ref 21–32)
CREAT SERPL-MCNC: 0.6 MG/DL (ref 0.6–1.2)
D-DIMER QUANTITATIVE: 0.82 UG/ML FEU (ref 0–0.6)
DEPRECATED RDW RBC AUTO: 14.6 % (ref 12.4–15.4)
EOSINOPHIL # BLD: 0.2 K/UL (ref 0–0.6)
EOSINOPHIL NFR BLD: 2.7 %
GFR SERPLBLD CREATININE-BSD FMLA CKD-EPI: >90 ML/MIN/{1.73_M2}
GLUCOSE SERPL-MCNC: 96 MG/DL (ref 70–99)
HCT VFR BLD AUTO: 33 % (ref 36–48)
HGB BLD-MCNC: 10.7 G/DL (ref 12–16)
LYMPHOCYTES # BLD: 2.2 K/UL (ref 1–5.1)
LYMPHOCYTES NFR BLD: 24.6 %
MCH RBC QN AUTO: 26.5 PG (ref 26–34)
MCHC RBC AUTO-ENTMCNC: 32.4 G/DL (ref 31–36)
MCV RBC AUTO: 81.8 FL (ref 80–100)
MONOCYTES # BLD: 0.7 K/UL (ref 0–1.3)
MONOCYTES NFR BLD: 8.1 %
NEUTROPHILS # BLD: 5.7 K/UL (ref 1.7–7.7)
NEUTROPHILS NFR BLD: 63.6 %
NT-PROBNP SERPL-MCNC: 41 PG/ML (ref 0–124)
PLATELET # BLD AUTO: 522 K/UL (ref 135–450)
PMV BLD AUTO: 6.9 FL (ref 5–10.5)
POTASSIUM SERPL-SCNC: 4.8 MMOL/L (ref 3.5–5.1)
PROT SERPL-MCNC: 7.6 G/DL (ref 6.4–8.2)
RBC # BLD AUTO: 4.03 M/UL (ref 4–5.2)
SODIUM SERPL-SCNC: 136 MMOL/L (ref 136–145)
TROPONIN, HIGH SENSITIVITY: 10 NG/L (ref 0–14)
WBC # BLD AUTO: 9 K/UL (ref 4–11)

## 2024-07-04 PROCEDURE — 99285 EMERGENCY DEPT VISIT HI MDM: CPT

## 2024-07-04 PROCEDURE — 70450 CT HEAD/BRAIN W/O DYE: CPT

## 2024-07-04 PROCEDURE — 36415 COLL VENOUS BLD VENIPUNCTURE: CPT

## 2024-07-04 PROCEDURE — 93005 ELECTROCARDIOGRAM TRACING: CPT | Performed by: PHYSICIAN ASSISTANT

## 2024-07-04 PROCEDURE — 83880 ASSAY OF NATRIURETIC PEPTIDE: CPT

## 2024-07-04 PROCEDURE — G0378 HOSPITAL OBSERVATION PER HR: HCPCS

## 2024-07-04 PROCEDURE — 80053 COMPREHEN METABOLIC PANEL: CPT

## 2024-07-04 PROCEDURE — 85379 FIBRIN DEGRADATION QUANT: CPT

## 2024-07-04 PROCEDURE — 2580000003 HC RX 258: Performed by: INTERNAL MEDICINE

## 2024-07-04 PROCEDURE — 85025 COMPLETE CBC W/AUTO DIFF WBC: CPT

## 2024-07-04 PROCEDURE — 6370000000 HC RX 637 (ALT 250 FOR IP): Performed by: PHYSICIAN ASSISTANT

## 2024-07-04 PROCEDURE — 71046 X-RAY EXAM CHEST 2 VIEWS: CPT

## 2024-07-04 PROCEDURE — 1200000000 HC SEMI PRIVATE

## 2024-07-04 PROCEDURE — 84484 ASSAY OF TROPONIN QUANT: CPT

## 2024-07-04 PROCEDURE — 6370000000 HC RX 637 (ALT 250 FOR IP): Performed by: INTERNAL MEDICINE

## 2024-07-04 PROCEDURE — 71260 CT THORAX DX C+: CPT

## 2024-07-04 PROCEDURE — 6360000004 HC RX CONTRAST MEDICATION: Performed by: PHYSICIAN ASSISTANT

## 2024-07-04 RX ORDER — TIZANIDINE 4 MG/1
4 TABLET ORAL EVERY 6 HOURS PRN
Status: DISCONTINUED | OUTPATIENT
Start: 2024-07-04 | End: 2024-07-06 | Stop reason: HOSPADM

## 2024-07-04 RX ORDER — ACETAMINOPHEN 325 MG/1
650 TABLET ORAL EVERY 6 HOURS PRN
Status: DISCONTINUED | OUTPATIENT
Start: 2024-07-04 | End: 2024-07-06 | Stop reason: HOSPADM

## 2024-07-04 RX ORDER — ASPIRIN 81 MG/1
81 TABLET, CHEWABLE ORAL DAILY
Status: DISCONTINUED | OUTPATIENT
Start: 2024-07-05 | End: 2024-07-06 | Stop reason: HOSPADM

## 2024-07-04 RX ORDER — MECOBALAMIN 5000 MCG
5 TABLET,DISINTEGRATING ORAL NIGHTLY
Status: DISCONTINUED | OUTPATIENT
Start: 2024-07-04 | End: 2024-07-06 | Stop reason: HOSPADM

## 2024-07-04 RX ORDER — NITROGLYCERIN 0.4 MG/1
0.4 TABLET SUBLINGUAL EVERY 5 MIN PRN
Status: DISCONTINUED | OUTPATIENT
Start: 2024-07-04 | End: 2024-07-06 | Stop reason: HOSPADM

## 2024-07-04 RX ORDER — AMITRIPTYLINE HYDROCHLORIDE 25 MG/1
25 TABLET, FILM COATED ORAL NIGHTLY
Status: DISCONTINUED | OUTPATIENT
Start: 2024-07-04 | End: 2024-07-06 | Stop reason: HOSPADM

## 2024-07-04 RX ORDER — SODIUM CHLORIDE 9 MG/ML
INJECTION, SOLUTION INTRAVENOUS PRN
Status: DISCONTINUED | OUTPATIENT
Start: 2024-07-04 | End: 2024-07-06 | Stop reason: HOSPADM

## 2024-07-04 RX ORDER — SODIUM CHLORIDE 0.9 % (FLUSH) 0.9 %
5-40 SYRINGE (ML) INJECTION EVERY 12 HOURS SCHEDULED
Status: DISCONTINUED | OUTPATIENT
Start: 2024-07-04 | End: 2024-07-06 | Stop reason: HOSPADM

## 2024-07-04 RX ORDER — ONDANSETRON 2 MG/ML
4 INJECTION INTRAMUSCULAR; INTRAVENOUS EVERY 6 HOURS PRN
Status: DISCONTINUED | OUTPATIENT
Start: 2024-07-04 | End: 2024-07-06 | Stop reason: HOSPADM

## 2024-07-04 RX ORDER — POTASSIUM CHLORIDE 20 MEQ/1
40 TABLET, EXTENDED RELEASE ORAL PRN
Status: DISCONTINUED | OUTPATIENT
Start: 2024-07-04 | End: 2024-07-06 | Stop reason: HOSPADM

## 2024-07-04 RX ORDER — ACETAMINOPHEN 500 MG
1000 TABLET ORAL ONCE
Status: COMPLETED | OUTPATIENT
Start: 2024-07-04 | End: 2024-07-04

## 2024-07-04 RX ORDER — POLYETHYLENE GLYCOL 3350 17 G/17G
17 POWDER, FOR SOLUTION ORAL DAILY PRN
Status: DISCONTINUED | OUTPATIENT
Start: 2024-07-04 | End: 2024-07-06 | Stop reason: HOSPADM

## 2024-07-04 RX ORDER — MORPHINE SULFATE 4 MG/ML
4 INJECTION, SOLUTION INTRAMUSCULAR; INTRAVENOUS
Status: DISCONTINUED | OUTPATIENT
Start: 2024-07-04 | End: 2024-07-06 | Stop reason: HOSPADM

## 2024-07-04 RX ORDER — HYDROCODONE BITARTRATE AND ACETAMINOPHEN 10; 325 MG/1; MG/1
1 TABLET ORAL ONCE
Status: COMPLETED | OUTPATIENT
Start: 2024-07-04 | End: 2024-07-04

## 2024-07-04 RX ORDER — ALBUTEROL SULFATE 90 UG/1
2 AEROSOL, METERED RESPIRATORY (INHALATION) EVERY 6 HOURS PRN
Status: DISCONTINUED | OUTPATIENT
Start: 2024-07-04 | End: 2024-07-06 | Stop reason: HOSPADM

## 2024-07-04 RX ORDER — MORPHINE SULFATE 15 MG/1
15 TABLET ORAL ONCE
Status: COMPLETED | OUTPATIENT
Start: 2024-07-04 | End: 2024-07-04

## 2024-07-04 RX ORDER — HYDROCODONE BITARTRATE AND ACETAMINOPHEN 10; 325 MG/1; MG/1
1 TABLET ORAL EVERY 4 HOURS PRN
Status: DISCONTINUED | OUTPATIENT
Start: 2024-07-04 | End: 2024-07-06 | Stop reason: HOSPADM

## 2024-07-04 RX ORDER — MIDODRINE HYDROCHLORIDE 5 MG/1
5 TABLET ORAL
Status: DISCONTINUED | OUTPATIENT
Start: 2024-07-05 | End: 2024-07-06 | Stop reason: HOSPADM

## 2024-07-04 RX ORDER — CALCIUM CARBONATE 500 MG/1
500 TABLET, CHEWABLE ORAL ONCE
Status: COMPLETED | OUTPATIENT
Start: 2024-07-04 | End: 2024-07-04

## 2024-07-04 RX ORDER — MAGNESIUM SULFATE IN WATER 40 MG/ML
2000 INJECTION, SOLUTION INTRAVENOUS PRN
Status: DISCONTINUED | OUTPATIENT
Start: 2024-07-04 | End: 2024-07-06 | Stop reason: HOSPADM

## 2024-07-04 RX ORDER — ONDANSETRON 4 MG/1
4 TABLET, ORALLY DISINTEGRATING ORAL EVERY 8 HOURS PRN
Status: DISCONTINUED | OUTPATIENT
Start: 2024-07-04 | End: 2024-07-06 | Stop reason: HOSPADM

## 2024-07-04 RX ORDER — SODIUM CHLORIDE 0.9 % (FLUSH) 0.9 %
5-40 SYRINGE (ML) INJECTION PRN
Status: DISCONTINUED | OUTPATIENT
Start: 2024-07-04 | End: 2024-07-06 | Stop reason: HOSPADM

## 2024-07-04 RX ORDER — ACETAMINOPHEN 650 MG/1
650 SUPPOSITORY RECTAL EVERY 6 HOURS PRN
Status: DISCONTINUED | OUTPATIENT
Start: 2024-07-04 | End: 2024-07-06 | Stop reason: HOSPADM

## 2024-07-04 RX ORDER — ATORVASTATIN CALCIUM 80 MG/1
80 TABLET, FILM COATED ORAL NIGHTLY
Status: DISCONTINUED | OUTPATIENT
Start: 2024-07-04 | End: 2024-07-06 | Stop reason: HOSPADM

## 2024-07-04 RX ORDER — FAMOTIDINE 20 MG/1
20 TABLET, FILM COATED ORAL 2 TIMES DAILY
Status: DISCONTINUED | OUTPATIENT
Start: 2024-07-04 | End: 2024-07-06 | Stop reason: HOSPADM

## 2024-07-04 RX ORDER — POTASSIUM CHLORIDE 7.45 MG/ML
10 INJECTION INTRAVENOUS PRN
Status: DISCONTINUED | OUTPATIENT
Start: 2024-07-04 | End: 2024-07-06 | Stop reason: HOSPADM

## 2024-07-04 RX ORDER — PANTOPRAZOLE SODIUM 40 MG/1
40 TABLET, DELAYED RELEASE ORAL DAILY
Status: DISCONTINUED | OUTPATIENT
Start: 2024-07-05 | End: 2024-07-06 | Stop reason: HOSPADM

## 2024-07-04 RX ORDER — TRAZODONE HYDROCHLORIDE 50 MG/1
300 TABLET ORAL NIGHTLY
Status: DISCONTINUED | OUTPATIENT
Start: 2024-07-04 | End: 2024-07-06 | Stop reason: HOSPADM

## 2024-07-04 RX ORDER — DULOXETIN HYDROCHLORIDE 20 MG/1
20 CAPSULE, DELAYED RELEASE ORAL DAILY
Status: DISCONTINUED | OUTPATIENT
Start: 2024-07-05 | End: 2024-07-06 | Stop reason: HOSPADM

## 2024-07-04 RX ADMIN — ACETAMINOPHEN 1000 MG: 500 TABLET ORAL at 18:57

## 2024-07-04 RX ADMIN — IOPAMIDOL 75 ML: 755 INJECTION, SOLUTION INTRAVENOUS at 19:38

## 2024-07-04 RX ADMIN — Medication 5 MG: at 23:38

## 2024-07-04 RX ADMIN — SODIUM CHLORIDE, PRESERVATIVE FREE 10 ML: 5 INJECTION INTRAVENOUS at 23:41

## 2024-07-04 RX ADMIN — HYDROCODONE BITARTRATE AND ACETAMINOPHEN 1 TABLET: 10; 325 TABLET ORAL at 20:09

## 2024-07-04 RX ADMIN — TIZANIDINE 4 MG: 4 TABLET ORAL at 23:39

## 2024-07-04 RX ADMIN — TRAZODONE HYDROCHLORIDE 300 MG: 50 TABLET ORAL at 23:39

## 2024-07-04 RX ADMIN — AMITRIPTYLINE HYDROCHLORIDE 25 MG: 25 TABLET, FILM COATED ORAL at 23:38

## 2024-07-04 RX ADMIN — CALCIUM CARBONATE 500 MG: 500 TABLET, CHEWABLE ORAL at 19:10

## 2024-07-04 RX ADMIN — MORPHINE SULFATE 15 MG: 15 TABLET ORAL at 20:08

## 2024-07-04 ASSESSMENT — ENCOUNTER SYMPTOMS
CHEST TIGHTNESS: 0
NAUSEA: 0
SINUS PAIN: 0
EYE DISCHARGE: 0
ABDOMINAL PAIN: 0
VOMITING: 0
DIARRHEA: 0
RHINORRHEA: 0
SHORTNESS OF BREATH: 1
COUGH: 0
CONSTIPATION: 0
SORE THROAT: 0
EYE REDNESS: 0
SINUS PRESSURE: 0

## 2024-07-04 ASSESSMENT — PAIN SCALES - GENERAL
PAINLEVEL_OUTOF10: 2
PAINLEVEL_OUTOF10: 5
PAINLEVEL_OUTOF10: 3

## 2024-07-04 ASSESSMENT — PAIN - FUNCTIONAL ASSESSMENT: PAIN_FUNCTIONAL_ASSESSMENT: NONE - DENIES PAIN

## 2024-07-04 ASSESSMENT — PAIN DESCRIPTION - LOCATION: LOCATION: BACK

## 2024-07-04 NOTE — ED PROVIDER NOTES
[facility]  ED  EMERGENCY DEPARTMENT ENCOUNTER        Pt Name: Radha Malin  MRN: [id number]  Birthdate 1960  Date of evaluation: 7/4/2024  Provider: Annette Turcios PA-C  PCP: Terrell Perez DO  Note Started: 6:14 PM EDT 7/4/24      SMOOTH. I have evaluated this patient.        CHIEF COMPLAINT       Chief Complaint   Patient presents with    Hypertension     States BP was 160-170 at home. Headaches. Does not take BP meds at home       HISTORY OF PRESENT ILLNESS: 1 or more Elements     History from : Patient    Limitations to history : None    Radha Malin is a 63 y.o. female with a  history of lung cancer, and postural hypotension who presents with 3-4 days of elevated BP, feeling jittery. Onset of sxms after completing Paxlovid, sxms onset 2 days after completing this course. Checking BP at home 4x per day, SBP always high 160-170s. Sometimes intermittent headaches with this. Chronic SOB/chest tightness after having lung cancer but feels that this may be worse over the past few days with the high blood pressure.    Wheelchair bound currently since tibia surgery on 5/8, no HTN meds at baseline.     Nursing Notes were all reviewed and agreed with or any disagreements were addressed in the HPI.    REVIEW OF SYSTEMS :      Review of Systems   Constitutional:  Negative for chills and fever.   HENT: Negative.  Negative for congestion, rhinorrhea, sinus pressure, sinus pain and sore throat.    Eyes:  Negative for discharge, redness and visual disturbance.   Respiratory:  Positive for shortness of breath. Negative for cough and chest tightness.    Cardiovascular:  Negative for chest pain and palpitations.   Gastrointestinal:  Negative for abdominal pain, constipation, diarrhea, nausea and vomiting.   Genitourinary:  Negative for difficulty urinating, dysuria and frequency.   Musculoskeletal: Negative.    Skin: Negative.    Neurological: Negative.  Negative for dizziness, weakness, numbness and  DEPARTMENT COURSE and DIFFERENTIAL DIAGNOSIS/MDM:   Vitals:    Vitals:    24 1744 242 24   BP: (!) 126/93 (!) 141/83 (!) 140/94   Pulse: 92 93 92   Resp:    Temp: 98.3 °F (36.8 °C)     TempSrc: Oral     SpO2: 97% 100% 100%   Weight: 64 kg (141 lb)     Height: 1.575 m (5' 2\")         Patient was given the following medications:  Medications   acetaminophen (TYLENOL) tablet 1,000 mg (1,000 mg Oral Given 24)   calcium carbonate (TUMS) chewable tablet 500 mg (500 mg Oral Given 24)   iopamidol (ISOVUE-370) 76 % injection 75 mL (75 mLs IntraVENous Given 24)   HYDROcodone-acetaminophen (NORCO)  MG per tablet 1 tablet (1 tablet Oral Given 24)   morphine (MSIR) tablet 15 mg (15 mg Oral Given 24)         CONSULTS: Admitting team:   Dr Caal, accepted in stable condition       CC/HPI Summary, DDx, ED Course, and Reassessment:   [facility], ED Bed #6, Radha Malin MRN#[id number] (Acct:[id number] CSN# [id number]) (:1960 63 y.o. F, admission for elevated blood pressure readings at home although (normotensive here) associated with headaches for the past several days. CT of her head indicates a frontal lobe hypodensity this has been Uncertain if this is acute. I am would like to admit for MRI.  EKG is normal sinus rhythm. Chest x-ray shows no acute findings. We did a chest CT to rule out a PE as patient 6 weeks ago had surgery and has had increased immobilization. Chest CT showed no evidence of an acute PE. Patient's labs are overall reassuring. Mild anemia but this is actually improved from the last labs that we have on file. Vital signs are stable. Patient is agreeable with admission.      I am the Primary Clinician of Record.    FINAL IMPRESSION      1. Nonintractable headache, unspecified chronicity pattern, unspecified headache type    2. H/O: hypertension    3. Abnormal head CT          DISPOSITION/PLAN     DISPOSITION Admitted  07/04/2024 09:18:18 PM      PATIENT REFERRED TO:  No follow-up provider specified.    DISCHARGE MEDICATIONS:  New Prescriptions    No medications on file       DISCONTINUED MEDICATIONS:  Discontinued Medications    No medications on file              (Please note that portions of this note were completed with a voice recognition program.  Efforts were made to edit the dictations but occasionally words are mis-transcribed.)    Annette Turcios PA-C (electronically signed)            Annette Turcios PA-C  07/04/24 2532

## 2024-07-05 ENCOUNTER — APPOINTMENT (OUTPATIENT)
Age: 64
DRG: 304 | End: 2024-07-05
Attending: INTERNAL MEDICINE
Payer: MEDICARE

## 2024-07-05 PROBLEM — R93.0 ABNORMAL HEAD CT: Status: ACTIVE | Noted: 2024-07-05

## 2024-07-05 PROBLEM — R51.9 NONINTRACTABLE HEADACHE: Status: ACTIVE | Noted: 2024-07-05

## 2024-07-05 LAB
ALBUMIN SERPL-MCNC: 4 G/DL (ref 3.4–5)
ALBUMIN/GLOB SERPL: 1.3 {RATIO} (ref 1.1–2.2)
ALP SERPL-CCNC: 105 U/L (ref 40–129)
ALT SERPL-CCNC: 13 U/L (ref 10–40)
ANION GAP SERPL CALCULATED.3IONS-SCNC: 10 MMOL/L (ref 3–16)
AST SERPL-CCNC: 14 U/L (ref 15–37)
BILIRUB SERPL-MCNC: <0.2 MG/DL (ref 0–1)
BUN SERPL-MCNC: 13 MG/DL (ref 7–20)
CALCIUM SERPL-MCNC: 9.9 MG/DL (ref 8.3–10.6)
CHLORIDE SERPL-SCNC: 102 MMOL/L (ref 99–110)
CHOLEST SERPL-MCNC: 169 MG/DL (ref 0–199)
CO2 SERPL-SCNC: 26 MMOL/L (ref 21–32)
CREAT SERPL-MCNC: 0.6 MG/DL (ref 0.6–1.2)
DEPRECATED RDW RBC AUTO: 14.3 % (ref 12.4–15.4)
ECHO AO ROOT DIAM: 2.7 CM
ECHO AO ROOT INDEX: 1.64 CM/M2
ECHO AV AREA PEAK VELOCITY: 2.2 CM2
ECHO AV AREA/BSA PEAK VELOCITY: 1.3 CM2/M2
ECHO AV CUSP MM: 1.7 CM
ECHO AV PEAK GRADIENT: 9 MMHG
ECHO AV PEAK VELOCITY: 1.5 M/S
ECHO AV VELOCITY RATIO: 1
ECHO BSA: 1.67 M2
ECHO EST RA PRESSURE: 8 MMHG
ECHO IVC PROX: 1.1 CM
ECHO LA AREA 2C: 11.6 CM2
ECHO LA AREA 4C: 12.9 CM2
ECHO LA DIAMETER INDEX: 1.7 CM/M2
ECHO LA DIAMETER: 2.8 CM
ECHO LA MAJOR AXIS: 4.5 CM
ECHO LA MINOR AXIS: 4.3 CM
ECHO LA TO AORTIC ROOT RATIO: 1.04
ECHO LA VOL BP: 27 ML (ref 22–52)
ECHO LA VOL MOD A2C: 25 ML (ref 22–52)
ECHO LA VOL MOD A4C: 29 ML (ref 22–52)
ECHO LA VOL/BSA BIPLANE: 16 ML/M2 (ref 16–34)
ECHO LA VOLUME INDEX MOD A2C: 15 ML/M2 (ref 16–34)
ECHO LA VOLUME INDEX MOD A4C: 18 ML/M2 (ref 16–34)
ECHO LV E' LATERAL VELOCITY: 7 CM/S
ECHO LV E' SEPTAL VELOCITY: 6 CM/S
ECHO LV EDV 3D: 63 ML
ECHO LV EDV INDEX 3D: 38 ML/M2
ECHO LV EJECTION FRACTION 3D: 57 %
ECHO LV ESV 3D: 27 ML
ECHO LV ESV INDEX 3D: 16 ML/M2
ECHO LV FRACTIONAL SHORTENING: 31 % (ref 28–44)
ECHO LV GLOBAL LONGITUDINAL STRAIN (GLS): -13.9 %
ECHO LV INTERNAL DIMENSION DIASTOLE INDEX: 1.94 CM/M2
ECHO LV INTERNAL DIMENSION DIASTOLIC: 3.2 CM (ref 3.9–5.3)
ECHO LV INTERNAL DIMENSION SYSTOLIC INDEX: 1.33 CM/M2
ECHO LV INTERNAL DIMENSION SYSTOLIC: 2.2 CM
ECHO LV ISOVOLUMETRIC RELAXATION TIME (IVRT): 109 MS
ECHO LV IVSD: 1.3 CM (ref 0.6–0.9)
ECHO LV MASS 2D: 104.3 G (ref 67–162)
ECHO LV MASS 3D INDEX: 57.6 G/M2
ECHO LV MASS 3D: 95 G
ECHO LV MASS INDEX 2D: 63.2 G/M2 (ref 43–95)
ECHO LV POSTERIOR WALL DIASTOLIC: 0.9 CM (ref 0.6–0.9)
ECHO LV RELATIVE WALL THICKNESS RATIO: 0.56
ECHO LVOT AREA: 2.3 CM2
ECHO LVOT DIAM: 1.7 CM
ECHO LVOT MEAN GRADIENT: 4 MMHG
ECHO LVOT PEAK GRADIENT: 8 MMHG
ECHO LVOT PEAK VELOCITY: 1.5 M/S
ECHO LVOT STROKE VOLUME INDEX: 34.4 ML/M2
ECHO LVOT SV: 56.7 ML
ECHO LVOT VTI: 25 CM
ECHO MV A VELOCITY: 0.76 M/S
ECHO MV E DECELERATION TIME (DT): 236 MS
ECHO MV E VELOCITY: 0.6 M/S
ECHO MV E/A RATIO: 0.79
ECHO MV E/E' LATERAL: 8.57
ECHO MV E/E' RATIO (AVERAGED): 9.29
ECHO MV E/E' SEPTAL: 10
ECHO RA AREA 4C: 9.7 CM2
ECHO RA END SYSTOLIC VOLUME APICAL 4 CHAMBER INDEX BSA: 11 ML/M2
ECHO RA VOLUME: 18 ML
ECHO RV FREE WALL PEAK S': 13 CM/S
ECHO RV TAPSE: 2.3 CM (ref 1.7–?)
EKG ATRIAL RATE: 86 BPM
EKG DIAGNOSIS: NORMAL
EKG P AXIS: 48 DEGREES
EKG P-R INTERVAL: 160 MS
EKG Q-T INTERVAL: 350 MS
EKG QRS DURATION: 76 MS
EKG QTC CALCULATION (BAZETT): 418 MS
EKG R AXIS: 48 DEGREES
EKG T AXIS: 31 DEGREES
EKG VENTRICULAR RATE: 86 BPM
GFR SERPLBLD CREATININE-BSD FMLA CKD-EPI: >90 ML/MIN/{1.73_M2}
GLUCOSE SERPL-MCNC: 96 MG/DL (ref 70–99)
HCT VFR BLD AUTO: 32.3 % (ref 36–48)
HDLC SERPL-MCNC: 44 MG/DL (ref 40–60)
HGB BLD-MCNC: 10.5 G/DL (ref 12–16)
LDLC SERPL CALC-MCNC: 98 MG/DL
MCH RBC QN AUTO: 26.3 PG (ref 26–34)
MCHC RBC AUTO-ENTMCNC: 32.5 G/DL (ref 31–36)
MCV RBC AUTO: 80.9 FL (ref 80–100)
PLATELET # BLD AUTO: 501 K/UL (ref 135–450)
PMV BLD AUTO: 7.1 FL (ref 5–10.5)
POTASSIUM SERPL-SCNC: 4.4 MMOL/L (ref 3.5–5.1)
PROT SERPL-MCNC: 7.2 G/DL (ref 6.4–8.2)
RBC # BLD AUTO: 3.99 M/UL (ref 4–5.2)
SODIUM SERPL-SCNC: 138 MMOL/L (ref 136–145)
TRIGL SERPL-MCNC: 135 MG/DL (ref 0–150)
TROPONIN, HIGH SENSITIVITY: 9 NG/L (ref 0–14)
TROPONIN, HIGH SENSITIVITY: 9 NG/L (ref 0–14)
VLDLC SERPL CALC-MCNC: 27 MG/DL
WBC # BLD AUTO: 7.6 K/UL (ref 4–11)

## 2024-07-05 PROCEDURE — 97165 OT EVAL LOW COMPLEX 30 MIN: CPT

## 2024-07-05 PROCEDURE — 97530 THERAPEUTIC ACTIVITIES: CPT

## 2024-07-05 PROCEDURE — 93306 TTE W/DOPPLER COMPLETE: CPT

## 2024-07-05 PROCEDURE — 6370000000 HC RX 637 (ALT 250 FOR IP): Performed by: INTERNAL MEDICINE

## 2024-07-05 PROCEDURE — 83036 HEMOGLOBIN GLYCOSYLATED A1C: CPT

## 2024-07-05 PROCEDURE — 93306 TTE W/DOPPLER COMPLETE: CPT | Performed by: STUDENT IN AN ORGANIZED HEALTH CARE EDUCATION/TRAINING PROGRAM

## 2024-07-05 PROCEDURE — 97162 PT EVAL MOD COMPLEX 30 MIN: CPT

## 2024-07-05 PROCEDURE — 1200000000 HC SEMI PRIVATE

## 2024-07-05 PROCEDURE — 2580000003 HC RX 258: Performed by: INTERNAL MEDICINE

## 2024-07-05 PROCEDURE — 36415 COLL VENOUS BLD VENIPUNCTURE: CPT

## 2024-07-05 PROCEDURE — 99223 1ST HOSP IP/OBS HIGH 75: CPT | Performed by: PSYCHIATRY & NEUROLOGY

## 2024-07-05 PROCEDURE — G0378 HOSPITAL OBSERVATION PER HR: HCPCS

## 2024-07-05 PROCEDURE — 93010 ELECTROCARDIOGRAM REPORT: CPT | Performed by: INTERNAL MEDICINE

## 2024-07-05 PROCEDURE — 97116 GAIT TRAINING THERAPY: CPT

## 2024-07-05 PROCEDURE — 80061 LIPID PANEL: CPT

## 2024-07-05 PROCEDURE — 93356 MYOCRD STRAIN IMG SPCKL TRCK: CPT | Performed by: STUDENT IN AN ORGANIZED HEALTH CARE EDUCATION/TRAINING PROGRAM

## 2024-07-05 PROCEDURE — 80053 COMPREHEN METABOLIC PANEL: CPT

## 2024-07-05 PROCEDURE — 84484 ASSAY OF TROPONIN QUANT: CPT

## 2024-07-05 PROCEDURE — 99222 1ST HOSP IP/OBS MODERATE 55: CPT | Performed by: INTERNAL MEDICINE

## 2024-07-05 PROCEDURE — 97535 SELF CARE MNGMENT TRAINING: CPT

## 2024-07-05 PROCEDURE — 76376 3D RENDER W/INTRP POSTPROCES: CPT | Performed by: STUDENT IN AN ORGANIZED HEALTH CARE EDUCATION/TRAINING PROGRAM

## 2024-07-05 PROCEDURE — 85027 COMPLETE CBC AUTOMATED: CPT

## 2024-07-05 RX ORDER — HYDROXYZINE PAMOATE 25 MG/1
25 CAPSULE ORAL 3 TIMES DAILY PRN
Status: DISCONTINUED | OUTPATIENT
Start: 2024-07-05 | End: 2024-07-06 | Stop reason: HOSPADM

## 2024-07-05 RX ORDER — LORAZEPAM 2 MG/ML
1 INJECTION INTRAMUSCULAR ONCE
Status: DISCONTINUED | OUTPATIENT
Start: 2024-07-05 | End: 2024-07-06 | Stop reason: HOSPADM

## 2024-07-05 RX ADMIN — DULOXETINE HYDROCHLORIDE 20 MG: 20 CAPSULE, DELAYED RELEASE ORAL at 07:46

## 2024-07-05 RX ADMIN — TRAZODONE HYDROCHLORIDE 300 MG: 50 TABLET ORAL at 20:37

## 2024-07-05 RX ADMIN — MIDODRINE HYDROCHLORIDE 5 MG: 5 TABLET ORAL at 17:13

## 2024-07-05 RX ADMIN — ASPIRIN 81 MG 81 MG: 81 TABLET ORAL at 07:46

## 2024-07-05 RX ADMIN — ATORVASTATIN CALCIUM 80 MG: 80 TABLET, FILM COATED ORAL at 20:37

## 2024-07-05 RX ADMIN — HYDROCODONE BITARTRATE AND ACETAMINOPHEN 1 TABLET: 10; 325 TABLET ORAL at 14:55

## 2024-07-05 RX ADMIN — MIDODRINE HYDROCHLORIDE 5 MG: 5 TABLET ORAL at 07:46

## 2024-07-05 RX ADMIN — PANTOPRAZOLE SODIUM 40 MG: 40 TABLET, DELAYED RELEASE ORAL at 07:46

## 2024-07-05 RX ADMIN — HYDROXYZINE PAMOATE 25 MG: 25 CAPSULE ORAL at 20:37

## 2024-07-05 RX ADMIN — HYDROCODONE BITARTRATE AND ACETAMINOPHEN 1 TABLET: 10; 325 TABLET ORAL at 20:37

## 2024-07-05 RX ADMIN — AMITRIPTYLINE HYDROCHLORIDE 25 MG: 25 TABLET, FILM COATED ORAL at 20:37

## 2024-07-05 RX ADMIN — ACETAMINOPHEN 650 MG: 325 TABLET ORAL at 22:26

## 2024-07-05 RX ADMIN — Medication 5 MG: at 20:37

## 2024-07-05 RX ADMIN — SODIUM CHLORIDE, PRESERVATIVE FREE 10 ML: 5 INJECTION INTRAVENOUS at 07:47

## 2024-07-05 RX ADMIN — HYDROXYZINE PAMOATE 25 MG: 25 CAPSULE ORAL at 09:26

## 2024-07-05 RX ADMIN — MIDODRINE HYDROCHLORIDE 5 MG: 5 TABLET ORAL at 12:46

## 2024-07-05 RX ADMIN — ACETAMINOPHEN 650 MG: 325 TABLET ORAL at 07:46

## 2024-07-05 RX ADMIN — SODIUM CHLORIDE, PRESERVATIVE FREE 10 ML: 5 INJECTION INTRAVENOUS at 20:38

## 2024-07-05 RX ADMIN — HYDROCODONE BITARTRATE AND ACETAMINOPHEN 1 TABLET: 10; 325 TABLET ORAL at 04:24

## 2024-07-05 RX ADMIN — ATORVASTATIN CALCIUM 80 MG: 80 TABLET, FILM COATED ORAL at 00:27

## 2024-07-05 ASSESSMENT — PAIN SCALES - GENERAL
PAINLEVEL_OUTOF10: 7
PAINLEVEL_OUTOF10: 4
PAINLEVEL_OUTOF10: 2
PAINLEVEL_OUTOF10: 5
PAINLEVEL_OUTOF10: 5
PAINLEVEL_OUTOF10: 4
PAINLEVEL_OUTOF10: 4
PAINLEVEL_OUTOF10: 2
PAINLEVEL_OUTOF10: 6

## 2024-07-05 ASSESSMENT — PAIN DESCRIPTION - LOCATION
LOCATION: BACK
LOCATION: LEG
LOCATION: BUTTOCKS
LOCATION: BUTTOCKS
LOCATION: LEG
LOCATION: BUTTOCKS

## 2024-07-05 ASSESSMENT — PAIN DESCRIPTION - ORIENTATION
ORIENTATION: LEFT
ORIENTATION: MID
ORIENTATION: LEFT

## 2024-07-05 ASSESSMENT — PAIN DESCRIPTION - DESCRIPTORS
DESCRIPTORS: ACHING;DISCOMFORT
DESCRIPTORS: ACHING

## 2024-07-05 NOTE — PLAN OF CARE
Problem: Discharge Planning  Goal: Discharge to home or other facility with appropriate resources  7/5/2024 1206 by Florecita Barba RN  Outcome: Progressing  7/5/2024 0230 by Pat Palomo RN  Outcome: Progressing  Flowsheets (Taken 7/4/2024 2330)  Discharge to home or other facility with appropriate resources: Identify barriers to discharge with patient and caregiver     Problem: Pain  Goal: Verbalizes/displays adequate comfort level or baseline comfort level  7/5/2024 1206 by Florecita Barba RN  Outcome: Progressing  7/5/2024 0230 by Pat Palomo RN  Outcome: Progressing     Problem: Safety - Adult  Goal: Free from fall injury  7/5/2024 1206 by Florecita Barba RN  Outcome: Progressing  7/5/2024 0230 by Pat Palomo RN  Outcome: Progressing     Problem: ABCDS Injury Assessment  Goal: Absence of physical injury  7/5/2024 1206 by Florecita Barba RN  Outcome: Progressing  7/5/2024 0230 by Pat Palomo RN  Outcome: Progressing     Problem: Neurosensory - Adult  Goal: Achieves stable or improved neurological status  7/5/2024 1206 by Florecita Barba RN  Outcome: Progressing  7/5/2024 0230 by Pat Palomo RN  Outcome: Progressing     Problem: Respiratory - Adult  Goal: Achieves optimal ventilation and oxygenation  7/5/2024 1206 by Florecita Barba RN  Outcome: Progressing  7/5/2024 0230 by Pat Palomo RN  Outcome: Progressing     Problem: Cardiovascular - Adult  Goal: Maintains optimal cardiac output and hemodynamic stability  7/5/2024 1206 by Florecita Barba RN  Outcome: Progressing  7/5/2024 0230 by Pat Palomo RN  Outcome: Progressing     Problem: Metabolic/Fluid and Electrolytes - Adult  Goal: Electrolytes maintained within normal limits  7/5/2024 1206 by Florecita Barba RN  Outcome: Progressing  7/5/2024 0230 by Pat Palomo RN  Outcome: Progressing

## 2024-07-05 NOTE — ED NOTES
TRANSFER - OUT REPORT:    Verbal report given to MAICO Stewart on Radha Malin  being transferred to  for routine progression of patient care       Report consisted of patient's Situation, Background, Assessment and   Recommendations(SBAR).     Information from the following report(s) Nurse Handoff Report, Index, ED Encounter Summary, ED SBAR, Adult Overview, Intake/Output, MAR, Recent Results, Cardiac Rhythm SR, and Neuro Assessment was reviewed with the receiving nurse.    [place] Fall Assessment:    Presents to emergency department  because of falls (Syncope, seizure, or loss of consciousness): No  Age > 70: No  Altered Mental Status, Intoxication with alcohol or substance confusion (Disorientation, impaired judgment, poor safety awaremess, or inability to follow instructions): No  Impaired Mobility: Ambulates or transfers with assistive devices or assistance; Unable to ambulate or transer.: Yes  Nursing Judgement: Yes          Lines:       Opportunity for questions and clarification was provided.      Patient transported with:  Monitor, Registered Nurse, and Tech

## 2024-07-05 NOTE — PROGRESS NOTES
Patient admitted to room 361 from ER.  Patient oriented to room, call light, bed rails, phone, lights and bathroom.  Patient instructed about the schedule of the day including: vital sign frequency, lab draws, possible tests, frequency of MD and staff rounds, including RN/MD rounding together at bedside, daily weights, and I &O's.  Patient instructed about prescribed diet, how to use 8MENU, and television.  bed alarm in place, patient aware of placement and reason.  Telemetry box 102 in place, patient aware of placement and reason.  Bed locked, in lowest position, side rails up 2/4, call light within reach.

## 2024-07-05 NOTE — PLAN OF CARE
Problem: Discharge Planning  Goal: Discharge to home or other facility with appropriate resources  Outcome: Progressing  Flowsheets (Taken 7/4/2024 3760)  Discharge to home or other facility with appropriate resources: Identify barriers to discharge with patient and caregiver     Problem: Pain  Goal: Verbalizes/displays adequate comfort level or baseline comfort level  Outcome: Progressing     Problem: Safety - Adult  Goal: Free from fall injury  Outcome: Progressing     Problem: ABCDS Injury Assessment  Goal: Absence of physical injury  Outcome: Progressing     Problem: Neurosensory - Adult  Goal: Achieves stable or improved neurological status  Outcome: Progressing     Problem: Respiratory - Adult  Goal: Achieves optimal ventilation and oxygenation  Outcome: Progressing     Problem: Cardiovascular - Adult  Goal: Maintains optimal cardiac output and hemodynamic stability  Outcome: Progressing     Problem: Metabolic/Fluid and Electrolytes - Adult  Goal: Electrolytes maintained within normal limits  Outcome: Progressing

## 2024-07-05 NOTE — H&P
Hospital Medicine History & Physical      PCP: Terrell Perez DO    Date of Admission: 7/4/2024    Date of Service: Pt seen/examined and Admitted with expected LOS greater than two midnights due to medical therapy.     Chief Complaint:      Persistent headaches, elevated blood pressure levels, chest pain    History Of Present Illness:    This is a 63-year-old female who presented to the emergency room today with a chief complaint of having persistent headaches which has been going on for the last few days.    Patient denies any loss of consciousness no blurry vision no double vision.    Patient denies any fever or chills  Neck stiffness no muscle weakness paralysis or loss of sensation.    Workup in the ED notable for  CT of head without contrast showed frontal lobe hypodensity this has been uncertain if it is acute.  She will need an MRI to clarify on this hypodensity.    Twelve-lead EKG shows a normal sinus rhythm no acute ischemic changes no ST elevation or depression all intervals within normal limits    CT PE of chest shows no acute pulmonary emboli    Laboratory workup overall unremarkable.  Except mild anemia.  Patient has been having recent surgery about 6 weeks ago which has caused more increased immobilization.    Patient denies any shortness of breath no palpitation orthopnea dyspnea with exertion no diaphoresis.  Patient denies any nausea vomiting    No diarrhea no constipation no hematemesis no rectal bleeding no burning sensation by urination no UTI symptoms no flank pain  Patient denies any muscle weakness paralysis or loss of sensation no blurry vision no double vision no neck stiffness.  No recent traveling no history of exposure to sick contacts    The workup in the emergency room notable for:  Overall unremarkable  D-dimer elevated 0.82  CT of chest negative for pulmonary emboli  Twelve-lead EKG reviewed by me shows no acute ischemic changes no ST elevation depression    Laboratory   Pulse (!) 111   Temp 98.1 °F (36.7 °C) (Oral)   Resp 18   Ht 1.575 m (5' 2\")   Wt 63.9 kg (140 lb 12.8 oz)   LMP 09/22/2011   SpO2 95%   BMI 25.75 kg/m²     General appearance:  No apparent distress, appears stated age and cooperative.  HEENT:  Normal cephalic, atraumatic without obvious deformity. Pupils equal, round, and reactive to light.  Extra ocular muscles intact. Conjunctivae/corneas clear.  Neck: Supple, with full range of motion. No jugular venous distention. Trachea midline.  Respiratory:  Normal respiratory effort. Clear to auscultation, bilaterally without Rales/Wheezes/Rhonchi.  Cardiovascular:  Regular rate and rhythm with normal S1/S2 without murmurs, rubs or gallops.  Abdomen: Soft, non-tender, non-distended with normal bowel sounds.  Musculoskeletal:  No clubbing, cyanosis or edema bilaterally.  Full range of motion without deformity.  Skin: Skin color, texture, turgor normal.  No rashes or lesions.  Neurologic:  Neurovascularly intact without any focal sensory/motor deficits.   Psychiatric:  Alert and oriented, thought content appropriate,  Peripheral Pulses: +2 palpable, equal bilaterally       Labs:     Recent Labs     07/04/24  1819   WBC 9.0   HGB 10.7*   HCT 33.0*   *     Recent Labs     07/04/24  1819      K 4.8      CO2 25   BUN 15   CREATININE 0.6   CALCIUM 10.1     Recent Labs     07/04/24  1819   AST 18   ALT 16   BILITOT <0.2   ALKPHOS 115     No results for input(s): \"INR\" in the last 72 hours.  Recent Labs     07/04/24  1819 07/05/24  0032 07/05/24  0226   TROPHS 10 9 9       Urinalysis:      Lab Results   Component Value Date/Time    NITRU Negative 06/05/2020 07:35 PM    WBCUA 3-5 08/30/2018 11:20 AM    BACTERIA 3+ 08/30/2018 11:20 AM    RBCUA 0-2 08/30/2018 11:20 AM    BLOODU Negative 06/05/2020 07:35 PM    GLUCOSEU Negative 06/05/2020 07:35 PM       Radiology:     CXR: I have reviewed the CXR.  EKG:  I have reviewed the EKG.     CT CHEST PULMONARY

## 2024-07-05 NOTE — PROGRESS NOTES
Occupational Therapy  Facility/Department: [place] [id number] - MED SURG  Occupational Therapy Initial Assessment    Name: Radha Malin  : 1960  MRN: [id number]  Date of Service: 2024    Discharge Recommendations:  Home with assist PRN  OT Equipment Recommendations  Equipment Needed: No (pt has all equipment needed)  Patient Diagnosis(es): The primary encounter diagnosis was Nonintractable headache, unspecified chronicity pattern, unspecified headache type. Diagnoses of H/O: hypertension, Abnormal head CT, and Hypertensive urgency were also pertinent to this visit.  Past Medical History:  has a past medical history of ADHD (attention deficit hyperactivity disorder), Arthritis, Asthma, Cancer (HCC), History of blood transfusion, Postural hypotension, Recurrent sinus infections, Wears glasses, and Wears partial dentures.  Past Surgical History:  has a past surgical history that includes Dental surgery; Knee arthroscopy; Carpal tunnel release; Nose surgery (Left); Tubal ligation; Ankle fracture surgery (Right, 2014); fracture surgery (Left); Lung removal, partial (Right); Ankle surgery (Right, 2023); Tibia fracture surgery (Left, 2024); and Breast reduction surgery.       Assessment   Assessment: 62 y/o female adm for SOB and headache PLOF ind in ADLs and mobility living at home with . Pt with recent tibia fx in May 2024 WBAT per ortho follow up.Pt reports she recently completed an IPR stay and was recently discharge home, had been receiving therapy at home. Pt currently SPV to mod I for mobility and transfers, ind for toileting and LBD and grooming. pt safe to discharge home A PRN when medically ready. OT signing off. Please reorder if change in functional status.  Prognosis: Good  Decision Making: Low Complexity  REQUIRES OT FOLLOW-UP: No  Activity Tolerance  Activity Tolerance: Patient Tolerated treatment well;Patient limited by fatigue  Activity Tolerance Comments: vitals stable , pt

## 2024-07-05 NOTE — PROGRESS NOTES
Hospitalist Progress Note      Name:  Radha Malin /Age/Sex: 1960  (63 y.o. female)   MRN & CSN:  [id number] & [id number] Admission Date/Time: 2024  5:41 PM   Location:  [facility] [id number]- PCP: Terrell Perez DO         Hospital Day: 2    Assessment and Plan:   Radha Malin is a 63 y.o.  female  who presents with Hypertensive urgency    Bitemporal headache  Hypertensive urgency  Blood pressure currently low -on midodrine prior to admission, continue for now  CT head with focal hypodensity in the left frontal lobe  MRI/MRA ordered  Neurology consulted    Chest pain, ACS ruled out  Continue aspirin and Lipitor.  Follow-up echocardiogram    Recent COVID-19 infection 2024  Completed therapy with Paxlovid  Denied respiratory symptoms  Continue isolation for total 10 days    History of lung cancer status post resection.    Orthostatic hypotension    Normochromic, normocytic anemia  Check iron panel    Recent spiral fracture of the left tibia requiring intramedullary nailing May 8, 2024.    Status post breast reduction surgery May 2024.    Asthma, not exacerbation.    Diet ADULT DIET; Regular; Low Fat/Low Chol/High Fiber/2 gm Na   DVT Prophylaxis [] Lovenox, []  Heparin, [x] SCDs, [] Warfarin  [] NOAC     GI Prophylaxis [] PPI,  [] H2 Blocker,  [] Carafate,  [x] Diet/Tube Feeds   Code Status Full Code   MDM [] Low, [] Moderate,[x]  High     History of Present Illness:     Chief Complaint: Hypertensive urgency    She was seen and examined today.  Headache is resolving.  She has no nausea or vomiting.  She denied lateralizing weakness or numbness.  She denied blurring of vision.  She denied fever or chills.    Ten point ROS reviewed negative, unless as noted above    Objective:     Intake/Output Summary (Last 24 hours) at 2024 1142  Last data filed at 2024 0746  Gross per 24 hour   Intake 250 ml   Output 100 ml   Net 150 ml      Vitals:   Vitals:    24 1120   BP: 99/61   Pulse: 85  10.5*   HCT 33.0* 32.3*   * 501*      Recent Labs     07/04/24  1819 07/05/24  0645    138   K 4.8 4.4    102   CO2 25 26   BUN 15 13   CREATININE 0.6 0.6     Recent Labs     07/04/24  1819 07/05/24  0645   AST 18 14*   ALT 16 13   BILITOT <0.2 <0.2   ALKPHOS 115 105     No results for input(s): \"INR\" in the last 72 hours.  No results for input(s): \"CKTOTAL\", \"CKMB\", \"CKMBINDEX\", \"TROPONINT\" in the last 72 hours.     Imaging reviewed      Electronically signed by Duncan Walters MD on 7/5/2024 at 11:42 AM

## 2024-07-05 NOTE — PROGRESS NOTES
4 Eyes Skin Assessment and Patient belongings     The patient is being assess for  Admission    I agree that 2 Nurses have performed a thorough Head to Toe Skin Assessment on the patient. ALL assessment sites listed below have been assessed.       Areas assessed by both nurses: yes  [x]   Head, Face, and Ears   [x]   Shoulders, Back, and Chest  [x]   Arms, Elbows, and Hands   [x]   Coccyx, Sacrum, and IschIum  [x]   Legs, Feet, and Heels  Scattered scars on left leg  Does the Patient have Skin Breakdown?  No         Osmany Prevention initiated:  No   Wound Care Orders initiated:  No      [facility] nurse consulted for Pressure Injury (Stage 3,4, Unstageable, DTI, NWPT, and Complex wounds), New and Established Ostomies:  No      I agree that 2 Nurses have reviewed patient belongings with the patient/family and documented in the flowsheet upon admission or transfer to the unit.     Belongings  Dental Appliances: Uppers, Lowers  Vision - Corrective Lenses: Eyeglasses, At bedside  Hearing Aid: None  Clothing: Pajamas, Shirt, Undergarments, Footwear, At bedside  Jewelry: None  Electronic Devices: Cell Phone, , Personal Electronic Devices, At bedside  Weapons (Notify Protective Services/Security): None  Home Medications: None  Valuables Given To: Patient  Provide Name(s) of Who Valuable(s) Were Given To: pearl       Nurse 1 eSignature: Electronically signed by Pat Palomo RN on 7/5/24 at 1:06 AM EDT    **SHARE this note so that the co-signing nurse is able to place an eSignature**    Nurse 2 eSignature: Electronically signed by Jostin Infante RN on 7/5/24 at 1:06 AM EDT

## 2024-07-05 NOTE — CONSULTS
In patient Neurology consult        [facility] Neurology      MD Radha Werner  1960    Date of Service: 7/5/2024    Referring Physician: Duncan Walters MD      Reason for the consult and CC: CT abnormality and new onset headache    HPI:   The patient is a 63 y.o.  years old female with a PMHx of lung cancer who presented to the hospital with c/o hypertension, SOB & anxiety. Neurology was consulted for CTH abnormality. Pt states that she has had a headache intermittently for several days which has now improved. Pt underwent a NCHCT In the ED which revealed a frontal lobe hypodensity of indeterminate age. An MRI was ordered for further evaluation.        Constitutional:   Vitals:    07/05/24 0454 07/05/24 0552 07/05/24 0730 07/05/24 0830   BP:   102/72 114/75   Pulse:   96 94   Resp: 18  18    Temp:   97.7 °F (36.5 °C)    TempSrc:   Oral    SpO2:   96%    Weight:  63.9 kg (140 lb 12.8 oz)     Height:             I personally reviewed and updated social history, past medical history, medications, allergy, surgical history, and family history as documented in the patient's electronic health records.       ROS: 10-14 ROS reviewed with the patient/nurse/family which were unremarkable except mentioned in H&P.    General appearance:  Normal development and appear in no acute distress.   Mental Status:   Oriented to person, place, problem, and time.    Memory: Good immediate recall.  Intact remote memory  Normal attention span and concentration.  Language: intact naming, repeating and fluency   Good fund of Knowledge. Aware of current events and vocabulary   Cranial Nerves:   II: Visual fields: Full. Pupils: equal, round, reactive to light, bilaterally  III,IV,VI: Extra Ocular Movements are intact. No nystagmus  V: Facial sensation is intact  VII: Facial strength and movements: intact and symmetric  IX: Normal palatal elevation and shoulder shrug  XII: Tongue movements are

## 2024-07-05 NOTE — PROGRESS NOTES
Physical Therapy  Facility/Department: [facility] B[facility] - MED SURG  Physical Therapy Initial Assessment    Name: Radha Malin  : 1960  MRN: [id number]  Date of Service: 2024    Discharge Recommendations:  Home with assist PRN, Home with Home health PT   PT Equipment Recommendations  Equipment Needed: No  Other: pt has RW at home, do not anticipate any additional needs.      Patient Diagnosis(es): The primary encounter diagnosis was Nonintractable headache, unspecified chronicity pattern, unspecified headache type. Diagnoses of H/O: hypertension, Abnormal head CT, and Hypertensive urgency were also pertinent to this visit.  Past Medical History:  has a past medical history of ADHD (attention deficit hyperactivity disorder), Arthritis, Asthma, Cancer (HCC), History of blood transfusion, Postural hypotension, Recurrent sinus infections, Wears glasses, and Wears partial dentures.  Past Surgical History:  has a past surgical history that includes Dental surgery; Knee arthroscopy; Carpal tunnel release; Nose surgery (Left); Tubal ligation; Ankle fracture surgery (Right, 2014); fracture surgery (Left); Lung removal, partial (Right); Ankle surgery (Right, 2023); Tibia fracture surgery (Left, 2024); and Breast reduction surgery.    Assessment   Assessment: Pt seen in conjunction with OT to maximize pt safety and mobility and safely assess pt maximal level of mobility.  Pt presents to [facility] with primary diagnosis of hypertensive urgency, diagnosis of L tibial FX on , pt now WBAT and able to wean out of boot as tolerated, was also pertinent.  PTA pt lived at home with significant other with level entry, reports MOD IND with mobility with RW since tibial FX and leaving [facility], states prior to tibial FX she was IND without AD.  Currently pt demonstrates MOD IND for bed mobility, MOD IND for functional transfers with SW, SBA for ambulation up to 20 feet X2 with SW.  Pt demonstrates decreased strength,  Location: Left upper arm  BP Method: Automatic  Patient Position: Sitting  Comment: HR 90 BPM, SPO2 95% on room air, /78     Observation/Palpation  Posture: Good (with SW.)  Gross Assessment  AROM: Within functional limits  PROM: Within functional limits  Strength: Generally decreased, functional (4-/5 MMT score in all major msucle groups of BLE, some pain when testing L knee F/E.)  Sensation: Intact      Bed Mobility Training  Bed Mobility Training: Yes  Overall Level of Assistance: Modified independent;Additional time;Adaptive equipment (with HOB elevated and use of BR.)  Interventions: Verbal cues  Supine to Sit: Modified independent;Additional time;Adaptive equipment  Sit to Supine: Modified independent;Additional time;Adaptive equipment  Balance  Sitting: Intact  Standing: Intact (with SW.  no balance deficits noted with SW.)  Transfer Training  Transfer Training: Yes  Overall Level of Assistance: Modified independent;Additional time;Adaptive equipment (with SW.)  Interventions: Verbal cues  Sit to Stand: Modified independent;Additional time;Adaptive equipment  Stand to Sit: Modified independent;Additional time;Adaptive equipment  Toilet Transfer: Modified independent;Additional time;Adaptive equipment  Gait Training  Left Side Weight Bearing: As tolerated  Gait  Gait Training: Yes  Left Side Weight Bearing: As tolerated  Overall Level of Assistance: Supervision;Additional time;Adaptive equipment  Distance (ft): 20 Feet (20' X2 with seated break on toilet in between.)  Assistive Device: Gait belt;Walker  Interventions: Verbal cues;Safety awareness training  Speed/Melissa: Slow  Gait Abnormalities:  (ambulates with SW, which limits ambulation speed, but no notable gait deficits aside from decreased speed for proper use of SW.)     AM-PAC - Mobility    AM-PAC Basic Mobility - Inpatient   How much help is needed turning from your back to your side while in a flat bed without using bedrails?: None  How much  discharge disposition.  Thank you.

## 2024-07-05 NOTE — CONSULTS
CARDIOLOGY CONSULTATION        Patient Name: Radha Malin  Date of admission: 7/4/2024  5:41 PM  Admission Dx: H/O: hypertension [Z86.79]  Abnormal head CT [R93.0]  Hypertensive urgency [I16.0]  Nonintractable headache, unspecified chronicity pattern, unspecified headache type [R51.9]  Requesting Physician: Daylin Caal MD  Primary Care physician: Terrell Perez DO    Reason for Consultation/Chief Complaint: Uncontrolled HTN    History of Present Illness:     Radha Malin is a 63 y.o. woman with h/o lung cancer s/p resection, former smoker, and orthostatic hypotension admitted for uncontrolled BP, atypical chest pain an SOB.  Patient was recently diagnosed with acute COVID infection and completed treatment with Paxlovid.  She is eight days from her initial infection.  She suffered a fall and fractured her L tibia in 05/2024 that required surgical repair.  She was placed in a rehab facility and believes she may have contracted COVID there.  Patient does not follow with a cardiologist regularly and reports she noted elevated BP readings over the last four days.  She also reports headaches, SOB and mild chest discomfort with radiating left arm pain.  In ED, troponin was negative and mild anemia was noted.  EKG reviewed.  BP is currently stable and patient is on midodrine tid.  CT head showed incidental finding of frontal lobe hypodensity.  Cardiology consulted to assist with management.    Past Medical History:   has a past medical history of ADHD (attention deficit hyperactivity disorder), Arthritis, Asthma, Cancer (HCC), History of blood transfusion, Postural hypotension, Recurrent sinus infections, Wears glasses, and Wears partial dentures.    Surgical History:   has a past surgical history that includes Dental surgery; Knee arthroscopy; Carpal tunnel release; Nose surgery (Left); Tubal ligation; Ankle fracture surgery (Right, 08/04/2014); fracture surgery (Left); Lung removal, partial (Right);  Ankle surgery (Right, 06/12/2023); Tibia fracture surgery (Left, 05/08/2024); and Breast reduction surgery.     Social History:   reports that she quit smoking about 2 years ago. Her smoking use included cigarettes. She started smoking about 37 years ago. She has a 35.0 pack-year smoking history. She has never used smokeless tobacco. She reports that she does not currently use alcohol. She reports that she does not currently use drugs.     Family History:  family history includes Stroke in her father. She was adopted.      Home Medications:  Were reviewed and are listed in nursing record and/or below  Prior to Admission medications    Medication Sig Start Date End Date Taking? Authorizing Provider   pantoprazole (PROTONIX) 40 MG tablet Take 1 tablet by mouth 2 times daily    Magdalene Youssef MD   amitriptyline (ELAVIL) 25 MG tablet Take 1 tablet by mouth nightly 5/28/24   Shannan Alan MD   DULoxetine (CYMBALTA) 20 MG extended release capsule Take 1 capsule by mouth daily 5/28/24   Shannan Alan MD   midodrine (PROAMATINE) 5 MG tablet Take 1 tablet by mouth 3 times daily (with meals) 5/28/24   Shannan Alan MD   naloxone 4 MG/0.1ML LIQD nasal spray 1 spray by Nasal route as needed for Opioid Reversal 5/28/24   Shannan Alan MD   tiZANidine (ZANAFLEX) 4 MG tablet Take 1 tablet by mouth every 6 hours as needed (muscle pain) 5/28/24   Shannan Alan MD   melatonin 5 MG TBDP disintegrating tablet Take 1 tablet by mouth nightly 5/13/24   Pam Bach MD   aspirin 81 MG EC tablet Take 1 tablet by mouth daily This was held for surgery    ProviderMagdalene MD   mirabegron (MYRBETRIQ) 50 MG TB24 Take 50 mg by mouth daily    Magdalene Youssef MD   meloxicam (MOBIC) 7.5 MG tablet Take 1 tablet by mouth daily    Magdalene Youssef MD   traZODone (DESYREL) 100 MG tablet Take 2 tablets by mouth nightly    Magdalene Youssef MD   diclofenac sodium 1 % GEL Apply 2  07/05/2024 06:45 AM    RBC 3.99 07/05/2024 06:45 AM    HGB 10.5 07/05/2024 06:45 AM    HCT 32.3 07/05/2024 06:45 AM    MCV 80.9 07/05/2024 06:45 AM    RDW 14.3 07/05/2024 06:45 AM     07/05/2024 06:45 AM     CMP:  Lab Results   Component Value Date/Time     07/05/2024 06:45 AM    K 4.4 07/05/2024 06:45 AM     07/05/2024 06:45 AM    CO2 26 07/05/2024 06:45 AM    BUN 13 07/05/2024 06:45 AM    CREATININE 0.6 07/05/2024 06:45 AM    GFRAA >60 03/08/2021 12:04 PM    AGRATIO 1.3 07/05/2024 06:45 AM    LABGLOM >90 07/05/2024 06:45 AM    GLUCOSE 96 07/05/2024 06:45 AM    CALCIUM 9.9 07/05/2024 06:45 AM    BILITOT <0.2 07/05/2024 06:45 AM    ALKPHOS 105 07/05/2024 06:45 AM    AST 14 07/05/2024 06:45 AM    ALT 13 07/05/2024 06:45 AM     PT/INR:  No results found for: \"PTINR\"  HgBA1c:No results found for: \"LABA1C\"  Lab Results   Component Value Date    TROPONINI <0.01 06/06/2020       No results found for: \"CHOL\"  No results found for: \"TRIG\"  No results found for: \"HDL\"  No components found for: \"LDLCHOLESTEROL\", \"LDLCALC\"  No results found for: \"VLDL\"  No results found for: \"CHOLHDLRATIO\"     Cardiac Data:     Telemetry personally reviewed: sinus rhythm at 86 bpm    EKG 07/04/2024: NSR @ 86 bpm with nonspecific T wave abnormality    Echo:    Stress Test:    Cardiac catheterization:    Additional studies:   CTA chest 07/04/2024: IMPRESSION:  1. No evidence of pulmonary embolism or acute pulmonary abnormality.  2. Chronic postsurgical changes of the right upper lobe.    CT Head 07/04/2024: IMPRESSION:  Focal hypodensity in the left frontal lobe could represent chronic ischemia  but no prior imaging available for comparison.  If there is concern for acute  infarct, recommend MRI for further evaluation.    Impression and Plan:      Hypertensive urgency  Chest pain  SOB  Frontal lobe hypodensity  Acute COVID infection    --Etiology for hypertensive urgency unclear, BP readings are now lower and requiring

## 2024-07-05 NOTE — CARE COORDINATION
Case Management Assessment  Initial Evaluation    Date/Time of Evaluation: 7/5/2024 10:50 AM  Assessment Completed by: Gemma Meneses RN    If patient is discharged prior to next notation, then this note serves as note for discharge by case management.    Patient Name: Radha Malin                   YOB: 1960  Diagnosis: H/O: hypertension [Z86.79]  Abnormal head CT [R93.0]  Hypertensive urgency [I16.0]  Nonintractable headache, unspecified chronicity pattern, unspecified headache type [R51.9]                   Date / Time: 7/4/2024  5:41 PM    Patient Admission Status: Inpatient   Readmission Risk (Low < 19, Mod (19-27), High > 27): Readmission Risk Score: 19.2    Current PCP: Terrell Perez DO  PCP verified by CM? Yes (Terrell Perez DO)    Chart Reviewed: Yes      History Provided by: Patient  Patient Orientation: Alert and Oriented    Patient Cognition: Alert    Hospitalization in the last 30 days (Readmission):  No    If yes, Readmission Assessment in CM Navigator will be completed.    Advance Directives:      Code Status: Full Code   Patient's Primary Decision Maker is: Named in Scanned ACP Document    Primary Decision Maker: TimoteoMatthew - Domestic Partner - [phone]    Secondary Decision Maker: Bibi Lou - Brother/Sister - [phone]    Discharge Planning:    Patient lives with: Spouse/Significant Other Type of Home: House  Primary Care Giver: Self  Patient Support Systems include: Spouse/Significant Other   Current Financial resources: Medicare  Current community resources: None  Current services prior to admission: Durable Medical Equipment            Current DME: Walker, Wheelchair, Cane (Rollator)            Type of Home Care services:  None    ADLS  Prior functional level: Independent in ADLs/IADLs, Assistance with the following:, Mobility, Housework, Cooking  Current functional level: Independent in ADLs/IADLs, Assistance with the following:, Housework, Cooking    PT  and agrees with the discharge plan. Freedom of choice list with basic dialogue that supports the patient's individualized plan of care/goals and shares the quality data associated with the providers was provided to:     Patient Representative Name:       The Patient and/or Patient Representative Agree with the Discharge Plan?      Gemma Meneses RN  Case Management Department  Ph: [phone]

## 2024-07-06 VITALS
BODY MASS INDEX: 25.91 KG/M2 | SYSTOLIC BLOOD PRESSURE: 128 MMHG | WEIGHT: 140.8 LBS | TEMPERATURE: 97.9 F | RESPIRATION RATE: 18 BRPM | DIASTOLIC BLOOD PRESSURE: 83 MMHG | HEART RATE: 101 BPM | OXYGEN SATURATION: 97 % | HEIGHT: 62 IN

## 2024-07-06 LAB
ALBUMIN SERPL-MCNC: 4.2 G/DL (ref 3.4–5)
ALBUMIN/GLOB SERPL: 1.2 {RATIO} (ref 1.1–2.2)
ALP SERPL-CCNC: 115 U/L (ref 40–129)
ALT SERPL-CCNC: 13 U/L (ref 10–40)
ANION GAP SERPL CALCULATED.3IONS-SCNC: 10 MMOL/L (ref 3–16)
AST SERPL-CCNC: 14 U/L (ref 15–37)
BASOPHILS # BLD: 0.1 K/UL (ref 0–0.2)
BASOPHILS NFR BLD: 0.6 %
BILIRUB SERPL-MCNC: <0.2 MG/DL (ref 0–1)
BUN SERPL-MCNC: 10 MG/DL (ref 7–20)
CALCIUM SERPL-MCNC: 9.8 MG/DL (ref 8.3–10.6)
CHLORIDE SERPL-SCNC: 101 MMOL/L (ref 99–110)
CO2 SERPL-SCNC: 24 MMOL/L (ref 21–32)
CREAT SERPL-MCNC: <0.5 MG/DL (ref 0.6–1.2)
DEPRECATED RDW RBC AUTO: 14.5 % (ref 12.4–15.4)
EOSINOPHIL # BLD: 0.1 K/UL (ref 0–0.6)
EOSINOPHIL NFR BLD: 1.5 %
EST. AVERAGE GLUCOSE BLD GHB EST-MCNC: 99.7 MG/DL
GFR SERPLBLD CREATININE-BSD FMLA CKD-EPI: >90 ML/MIN/{1.73_M2}
GLUCOSE SERPL-MCNC: 117 MG/DL (ref 70–99)
HBA1C MFR BLD: 5.1 %
HCT VFR BLD AUTO: 33.7 % (ref 36–48)
HGB BLD-MCNC: 10.8 G/DL (ref 12–16)
LYMPHOCYTES # BLD: 1.7 K/UL (ref 1–5.1)
LYMPHOCYTES NFR BLD: 20.2 %
MCH RBC QN AUTO: 25.8 PG (ref 26–34)
MCHC RBC AUTO-ENTMCNC: 32.1 G/DL (ref 31–36)
MCV RBC AUTO: 80.4 FL (ref 80–100)
MONOCYTES # BLD: 0.6 K/UL (ref 0–1.3)
MONOCYTES NFR BLD: 7 %
NEUTROPHILS # BLD: 5.9 K/UL (ref 1.7–7.7)
NEUTROPHILS NFR BLD: 70.7 %
PLATELET # BLD AUTO: 512 K/UL (ref 135–450)
PMV BLD AUTO: 6.7 FL (ref 5–10.5)
POTASSIUM SERPL-SCNC: 4.3 MMOL/L (ref 3.5–5.1)
PROT SERPL-MCNC: 7.6 G/DL (ref 6.4–8.2)
RBC # BLD AUTO: 4.19 M/UL (ref 4–5.2)
SODIUM SERPL-SCNC: 135 MMOL/L (ref 136–145)
WBC # BLD AUTO: 8.3 K/UL (ref 4–11)

## 2024-07-06 PROCEDURE — 85025 COMPLETE CBC W/AUTO DIFF WBC: CPT

## 2024-07-06 PROCEDURE — 6370000000 HC RX 637 (ALT 250 FOR IP): Performed by: INTERNAL MEDICINE

## 2024-07-06 PROCEDURE — G0378 HOSPITAL OBSERVATION PER HR: HCPCS

## 2024-07-06 PROCEDURE — APPSS30 APP SPLIT SHARED TIME 16-30 MINUTES

## 2024-07-06 PROCEDURE — 36415 COLL VENOUS BLD VENIPUNCTURE: CPT

## 2024-07-06 PROCEDURE — 2580000003 HC RX 258: Performed by: INTERNAL MEDICINE

## 2024-07-06 PROCEDURE — 80053 COMPREHEN METABOLIC PANEL: CPT

## 2024-07-06 PROCEDURE — 99232 SBSQ HOSP IP/OBS MODERATE 35: CPT | Performed by: PSYCHIATRY & NEUROLOGY

## 2024-07-06 RX ORDER — ATORVASTATIN CALCIUM 80 MG/1
80 TABLET, FILM COATED ORAL NIGHTLY
Qty: 30 TABLET | Refills: 0 | Status: SHIPPED | OUTPATIENT
Start: 2024-07-06

## 2024-07-06 RX ADMIN — MIDODRINE HYDROCHLORIDE 5 MG: 5 TABLET ORAL at 12:12

## 2024-07-06 RX ADMIN — HYDROXYZINE PAMOATE 25 MG: 25 CAPSULE ORAL at 13:04

## 2024-07-06 RX ADMIN — HYDROCODONE BITARTRATE AND ACETAMINOPHEN 1 TABLET: 10; 325 TABLET ORAL at 08:26

## 2024-07-06 RX ADMIN — DULOXETINE HYDROCHLORIDE 20 MG: 20 CAPSULE, DELAYED RELEASE ORAL at 08:25

## 2024-07-06 RX ADMIN — MIDODRINE HYDROCHLORIDE 5 MG: 5 TABLET ORAL at 08:26

## 2024-07-06 RX ADMIN — FAMOTIDINE 20 MG: 20 TABLET, FILM COATED ORAL at 08:25

## 2024-07-06 RX ADMIN — HYDROCODONE BITARTRATE AND ACETAMINOPHEN 1 TABLET: 10; 325 TABLET ORAL at 12:12

## 2024-07-06 RX ADMIN — PANTOPRAZOLE SODIUM 40 MG: 40 TABLET, DELAYED RELEASE ORAL at 08:25

## 2024-07-06 RX ADMIN — SODIUM CHLORIDE, PRESERVATIVE FREE 10 ML: 5 INJECTION INTRAVENOUS at 10:26

## 2024-07-06 RX ADMIN — ASPIRIN 81 MG 81 MG: 81 TABLET ORAL at 08:26

## 2024-07-06 ASSESSMENT — PAIN DESCRIPTION - LOCATION
LOCATION: BUTTOCKS
LOCATION: BUTTOCKS

## 2024-07-06 ASSESSMENT — PAIN SCALES - GENERAL
PAINLEVEL_OUTOF10: 6
PAINLEVEL_OUTOF10: 3
PAINLEVEL_OUTOF10: 6
PAINLEVEL_OUTOF10: 4

## 2024-07-06 ASSESSMENT — PAIN DESCRIPTION - ORIENTATION: ORIENTATION: MID

## 2024-07-06 ASSESSMENT — PAIN DESCRIPTION - DESCRIPTORS: DESCRIPTORS: ACHING;DISCOMFORT

## 2024-07-06 NOTE — PLAN OF CARE
Problem: Discharge Planning  Goal: Discharge to home or other facility with appropriate resources  Outcome: Progressing     Problem: Pain  Goal: Verbalizes/displays adequate comfort level or baseline comfort level  Outcome: Progressing     Problem: Safety - Adult  Goal: Free from fall injury  Outcome: Progressing     Problem: ABCDS Injury Assessment  Goal: Absence of physical injury  Outcome: Progressing     Problem: Neurosensory - Adult  Goal: Achieves stable or improved neurological status  7/6/2024 1050 by Florecita Barba RN  Outcome: Progressing  7/5/2024 2145 by Ulysses Bearden RN  Outcome: Progressing  Flowsheets (Taken 7/5/2024 2145)  Achieves stable or improved neurological status:   Assess for and report changes in neurological status   Monitor temperature, glucose, and sodium. Initiate appropriate interventions as ordered     Problem: Respiratory - Adult  Goal: Achieves optimal ventilation and oxygenation  Outcome: Progressing     Problem: Cardiovascular - Adult  Goal: Maintains optimal cardiac output and hemodynamic stability  Outcome: Progressing     Problem: Metabolic/Fluid and Electrolytes - Adult  Goal: Electrolytes maintained within normal limits  Outcome: Progressing     Problem: Musculoskeletal - Adult  Goal: Return mobility to safest level of function  Outcome: Progressing  Goal: Return ADL status to a safe level of function  Outcome: Progressing     Problem: Anxiety  Goal: Will report anxiety at manageable levels  Description: INTERVENTIONS:  1. Administer medication as ordered  2. Teach and rehearse alternative coping skills  3. Provide emotional support with 1:1 interaction with staff  Outcome: Progressing     Problem: Coping  Goal: Pt/Family able to verbalize concerns and demonstrate effective coping strategies  Description: INTERVENTIONS:  1. Assist patient/family to identify coping skills, available support systems and cultural and spiritual values  2. Provide emotional support,

## 2024-07-06 NOTE — DISCHARGE SUMMARY
V2.0  Discharge Summary    Name:  Radha Malin /Age/Sex: 1960 (63 y.o. female)   Admit Date: 2024  Discharge Date: 24    MRN & CSN:  [id number] & [id number] Encounter Date and Time 24 12:16 PM EDT    Attending:  Vasile Miramontes MD Discharging Provider: Agapito Saucedo DO       Hospital Course:     Brief HPI: Radha Malin is a 63 y.o. female with pmh of ADHD, arthritis, asthma, lung cancer, postural hypotension who presents with persistent headaches for several days.  BP was 170s at home.  ED lab workup was unremarkable aside from mild anemia.  CT head showed frontal lobe hypodensity.  EKG showed normal sinus rhythm.  CT PE showed no PE.  Patient was admitted for hypertensive urgency.     Brief Problem Based Course:     Bitemporal headache  Hypertension  -Blood pressure has been low.  Resume home midodrine  -CT head with focal hypodensity in the left frontal lobe  -Neurology consulted              -Possible lacunar stroke              -MRI ordered for outpatient   -Follow up with neurology outpatient     Chest pain  -ACS ruled out  -No complaints of chest pain during admission  -Resume aspirin and atorvastatin  -Cardiology consulted              -Echo EF 57%. Normal LV function.              -Follow-up outpatient     Recent COVID-19 infection 2024  History of lung cancer status post resection  -Completed therapy with Paxlovid  -Denies any respiratory symptoms at this time  -Last day of isolation      Normochromic, normocytic anemia  -Hemoglobin stable at 10.8     Asthma  -Not in acute exacerbation     Mood disorder  -Resume home Cymbalta, amitriptyline, trazodone    The patient expressed appropriate understanding of, and agreement with the discharge recommendations, medications, and plan.     Consults this admission:  IP CONSULT TO CARDIOLOGY  IP CONSULT TO NEUROLOGY    Discharge Diagnosis:   Hypertensive urgency    Discharge Instruction:   Follow up appointments:   Primary care

## 2024-07-06 NOTE — PROGRESS NOTES
Radha Malin  Neurology Follow-up  [facility] Neurology    Date of Service: 7/6/2024    Subjective:   CC: Follow up today regarding: CT abnormality and new onset headache     Events noted. Chart and lab reviewed. Pt has had no further symptoms. Awaiting MRI, pt has a bladder stimulator which has delayed this.       ROS : A 10-12 system review obtained and updated today and is unremarkable except as mentioned  in my interval history.     Medication, past medical history, social history, and family history reviewed.    Objective:  Exam:   Constitutional:   Vitals:    07/05/24 2030 07/06/24 0815 07/06/24 0826 07/06/24 0856   BP: 113/81 128/83     Pulse: 94 (!) 101     Resp: 16 18 18 18   Temp: 97.7 °F (36.5 °C) 97.9 °F (36.6 °C)     TempSrc: Oral Oral     SpO2: 96% 97%     Weight:       Height:         General appearance:  Normal development and appear in no acute distress.   Mental Status:   Oriented to person, place, problem, and time.    Memory: Good immediate recall.  Intact remote memory  Normal attention span and concentration.  Language: intact naming, repeating and fluency   Good fund of Knowledge. Aware of current events and vocabulary   Cranial Nerves:   II: Visual fields: Full. Pupils: equal, round, reactive to light, bilaterally  III,IV,VI: Extra Ocular Movements are intact. No nystagmus  V: Facial sensation is intact  VII: Facial strength and movements: intact and symmetric  IX: Normal palatal elevation and shoulder shrug  XII: Tongue movements are normal  Musculoskeletal: 5/5 in all 4 extremities.  Good range of motion.  No muscle atrophy.    Tone: Normal tone.   Reflexes: Symmetric 2+ in the arms and 2+ in the legs   Planters: Flexor bilaterally  Coordination: no pronator drift, no dysmetria with FNF and normal REM  Sensation: normal in both arms and legs.  Gait/Posture: Pt is sitting in chair at time of my exam        Data:  LABS:   Lab Results   Component Value Date/Time     07/06/2024 09:30 AM

## 2024-07-06 NOTE — PLAN OF CARE
Problem: Neurosensory - Adult  Goal: Achieves stable or improved neurological status  7/5/2024 2145 by Ulysses Bearden RN  Outcome: Progressing  Flowsheets (Taken 7/5/2024 2145)  Achieves stable or improved neurological status:   Assess for and report changes in neurological status   Monitor temperature, glucose, and sodium. Initiate appropriate interventions as ordered

## 2024-07-06 NOTE — PLAN OF CARE
Problem: Discharge Planning  Goal: Discharge to home or other facility with appropriate resources  7/6/2024 1236 by Florecita Barba RN  Outcome: Adequate for Discharge  7/6/2024 1050 by Florecita Barba RN  Outcome: Progressing     Problem: Pain  Goal: Verbalizes/displays adequate comfort level or baseline comfort level  7/6/2024 1236 by Florecita Barba RN  Outcome: Adequate for Discharge  7/6/2024 1050 by Florecita Barba RN  Outcome: Progressing     Problem: Safety - Adult  Goal: Free from fall injury  7/6/2024 1236 by Florecita Barba RN  Outcome: Adequate for Discharge  7/6/2024 1050 by Florecita Barba RN  Outcome: Progressing     Problem: ABCDS Injury Assessment  Goal: Absence of physical injury  7/6/2024 1236 by Florecita Barba RN  Outcome: Adequate for Discharge  7/6/2024 1050 by Florecita Barba RN  Outcome: Progressing     Problem: Neurosensory - Adult  Goal: Achieves stable or improved neurological status  7/6/2024 1236 by Florecita Barba RN  Outcome: Adequate for Discharge  7/6/2024 1050 by Florecita Barba RN  Outcome: Progressing     Problem: Respiratory - Adult  Goal: Achieves optimal ventilation and oxygenation  7/6/2024 1236 by Florecita Barba RN  Outcome: Adequate for Discharge  7/6/2024 1050 by Florecita Barba RN  Outcome: Progressing     Problem: Cardiovascular - Adult  Goal: Maintains optimal cardiac output and hemodynamic stability  7/6/2024 1236 by Florecita Barba RN  Outcome: Adequate for Discharge  7/6/2024 1050 by Florecita Barba RN  Outcome: Progressing     Problem: Metabolic/Fluid and Electrolytes - Adult  Goal: Electrolytes maintained within normal limits  7/6/2024 1236 by Florecita Barba RN  Outcome: Adequate for Discharge  7/6/2024 1050 by Florecita Barba RN  Outcome: Progressing     Problem: Musculoskeletal - Adult  Goal: Return mobility to safest level of function  7/6/2024 1236 by Freda  MAICO Bernabe  Outcome: Adequate for Discharge  7/6/2024 1050 by Florecita Barba RN  Outcome: Progressing  Goal: Return ADL status to a safe level of function  7/6/2024 1236 by Florecita Barba RN  Outcome: Adequate for Discharge  7/6/2024 1050 by Florecita Barba RN  Outcome: Progressing     Problem: Anxiety  Goal: Will report anxiety at manageable levels  Description: INTERVENTIONS:  1. Administer medication as ordered  2. Teach and rehearse alternative coping skills  3. Provide emotional support with 1:1 interaction with staff  7/6/2024 1236 by Florecita Barba RN  Outcome: Adequate for Discharge  7/6/2024 1050 by Florecita Barba RN  Outcome: Progressing     Problem: Coping  Goal: Pt/Family able to verbalize concerns and demonstrate effective coping strategies  Description: INTERVENTIONS:  1. Assist patient/family to identify coping skills, available support systems and cultural and spiritual values  2. Provide emotional support, including active listening and acknowledgement of concerns of patient and caregivers  3. Reduce environmental stimuli, as able  4. Instruct patient/family in relaxation techniques, as appropriate  5. Assess for spiritual pain/suffering and initiate Spiritual Care, Psychosocial Clinical Specialist consults as needed  7/6/2024 1236 by Florecita Barba RN  Outcome: Adequate for Discharge  7/6/2024 1050 by Florecita Barba RN  Outcome: Progressing

## 2024-09-18 ENCOUNTER — OFFICE VISIT (OUTPATIENT)
Dept: ORTHOPEDIC SURGERY | Age: 64
End: 2024-09-18
Payer: MEDICARE

## 2024-09-18 VITALS — BODY MASS INDEX: 25.76 KG/M2 | WEIGHT: 140 LBS | HEIGHT: 62 IN

## 2024-09-18 DIAGNOSIS — M84.68XA PATHOLOGICAL FRACTURE IN OTHER DISEASE, OTHER SITE, INITIAL ENCOUNTER FOR FRACTURE: ICD-10-CM

## 2024-09-18 DIAGNOSIS — S82.202D TIBIA/FIBULA FRACTURE, SHAFT, LEFT, CLOSED, WITH ROUTINE HEALING, SUBSEQUENT ENCOUNTER: Primary | ICD-10-CM

## 2024-09-18 DIAGNOSIS — S82.402D TIBIA/FIBULA FRACTURE, SHAFT, LEFT, CLOSED, WITH ROUTINE HEALING, SUBSEQUENT ENCOUNTER: Primary | ICD-10-CM

## 2024-09-18 PROCEDURE — 99213 OFFICE O/P EST LOW 20 MIN: CPT | Performed by: ORTHOPAEDIC SURGERY

## 2024-10-08 ENCOUNTER — HOSPITAL ENCOUNTER (OUTPATIENT)
Dept: PHYSICAL THERAPY | Age: 64
Setting detail: THERAPIES SERIES
Discharge: HOME | End: 2024-10-08
Attending: ORTHOPAEDIC SURGERY
Payer: MEDICARE

## 2024-10-08 DIAGNOSIS — R26.2 DIFFICULTY WALKING: Primary | ICD-10-CM

## 2024-10-08 DIAGNOSIS — M62.81 MUSCLE WEAKNESS OF LOWER EXTREMITY: ICD-10-CM

## 2024-10-08 DIAGNOSIS — R26.89 BALANCE PROBLEMS: ICD-10-CM

## 2024-10-08 DIAGNOSIS — M79.605 LEFT LEG PAIN: ICD-10-CM

## 2024-10-08 PROCEDURE — 97161 PT EVAL LOW COMPLEX 20 MIN: CPT | Performed by: PHYSICAL THERAPIST

## 2024-10-08 PROCEDURE — 97110 THERAPEUTIC EXERCISES: CPT | Performed by: PHYSICAL THERAPIST

## 2024-10-08 NOTE — PLAN OF CARE
[facility]- Outpatient Rehabilitation and Therapy  [address], [place] [address] office: [phone] fax: [phone]     Physical Therapy Initial Evaluation Certification      Dear Vasile Antunez MD,    We had the pleasure of evaluating the following patient for physical therapy services at [facility].  A summary of our findings can be found in the initial assessment below.  This includes our plan of care.  If you have any questions or concerns regarding these findings, please do not hesitate to contact me at the office phone number listed above.  Thank you for the referral.     Physician Signature:_______________________________Date:__________________  By signing above (or electronic signature), therapist’s plan is approved by physician       Physical Therapy: TREATMENT/PROGRESS NOTE   Patient: Radha Malin (63 y.o. female)   Examination Date: 10/08/2024   :  1960 MRN: [id number]   Visit #: 1   Insurance Allowable Auth Needed   [place]    AUTH REQUIRED THROUGH [place]/ [id number]/ [id number] ONLY []Yes    [x]No    Insurance: Payor: [facility] MEDICARE / Plan: ANTHEM MEDIBLUE ESSENTIAL/PLUS / Product Type: *No Product type* /   Insurance ID: [id number] - (Medicare Managed)  Secondary Insurance (if applicable):    Treatment Diagnosis:     ICD-10-CM    1. Difficulty walking  R26.2       2. Left leg pain  M79.605       3. Balance problems  R26.89       4. Muscle weakness of lower extremity  M62.81          Medical Diagnosis:  Pathological fracture in other disease, other site, initial encounter for fracture [M84.68XA]   Referring Physician: Vasile Antunez MD  PCP: Terrell Perez DO     Plan of care signed (Y/N): NO    Date of Patient follow up with Physician: 3/12/25     Plan of Care Report: EVAL today  POC update due: (10 visits /OR AUTH LIMITS, whichever is less)  2024                                             Medical

## 2024-10-10 ENCOUNTER — HOSPITAL ENCOUNTER (OUTPATIENT)
Dept: PHYSICAL THERAPY | Age: 64
Setting detail: THERAPIES SERIES
Discharge: HOME | End: 2024-10-10
Attending: ORTHOPAEDIC SURGERY
Payer: MEDICARE

## 2024-10-10 PROCEDURE — 97112 NEUROMUSCULAR REEDUCATION: CPT

## 2024-10-10 PROCEDURE — 97110 THERAPEUTIC EXERCISES: CPT

## 2024-10-10 NOTE — FLOWSHEET NOTE
[facility]- Outpatient Rehabilitation and Therapy  [address], [place] [address] office: [phone] fax: [phone]       Physical Therapy: TREATMENT/PROGRESS NOTE   Patient: Radha Malin (63 y.o. female)   Examination Date: 10/10/2024   :  1960 MRN: [id number]   Visit #: 2   Insurance Allowable Auth Needed     Authorization Submission Information:    Approval dates: 10/8/24 - 24  Approved visits: 7  Approved codes: 48741,30082,96040,69133,54065,11662  Codes that DO NOT require auth: NA    Denied codes: NA    Did auth pend? NO  Authorization #: [id number]     Comments: NO  []Yes    [x]No    Insurance: Payor: [facility] MEDICARE / Plan: ANTHEM MEDIBLUE ESSENTIAL/PLUS / Product Type: *No Product type* /   Insurance ID: [id number] - (Medicare Managed)  Secondary Insurance (if applicable):    Treatment Diagnosis:     ICD-10-CM    1. Difficulty walking  R26.2       2. Left leg pain  M79.605       3. Balance problems  R26.89       4. Muscle weakness of lower extremity  M62.81          Medical Diagnosis:  Pathological fracture in other disease, other site, initial encounter for fracture [M84.68XA]   Referring Physician: Vasile Antunez MD  PCP: Terrell Perez DO     Plan of care signed (Y/N): NO    Date of Patient follow up with Physician: 3/12/25     Plan of Care Report: NO  POC update due: (10 visits /OR AUTH LIMITS, whichever is less)  2024                                             Medical History:  Comorbidities:  Cancer/Tumor  Osteoporosis/Osteopenia  Osteoarthritis  Anxiety  Anemia  High cholesterol  Relevant Medical History: R ankle fx  w hardware. Hardware removal R ankle 2023.  Previous L foot fx x2   B hand OA  Pt has a hx of possible pulled HS at attachment                                         Precautions/ Contra-indications:           Latex allergy:  NO  Pacemaker:    NO  Contraindications for Manipulation: None, recent fracture, and recent

## 2024-10-15 ENCOUNTER — HOSPITAL ENCOUNTER (OUTPATIENT)
Dept: PHYSICAL THERAPY | Age: 64
Setting detail: THERAPIES SERIES
Discharge: HOME | End: 2024-10-15
Attending: ORTHOPAEDIC SURGERY
Payer: MEDICARE

## 2024-10-15 PROCEDURE — 97110 THERAPEUTIC EXERCISES: CPT | Performed by: PHYSICAL THERAPIST

## 2024-10-15 PROCEDURE — 97112 NEUROMUSCULAR REEDUCATION: CPT | Performed by: PHYSICAL THERAPIST

## 2024-10-15 NOTE — FLOWSHEET NOTE
Progressing: [] Met: [] Not Met: [] Adjusted    Therapist goals for Patient:   Short Term Goals: To be achieved in: 2 weeks  1. Independent in HEP and progression per patient tolerance, in order to prevent re-injury.   [] Progressing: [] Met: [] Not Met: [] Adjusted  2. Patient will have a decrease in pain to <5/10 to facilitate improvement in movement, function, and ADLs as indicated by Functional Deficits.  [] Progressing: [] Met: [] Not Met: [] Adjusted    Long Term Goals: To be achieved in: 8 weeks  1. Disability index score of 50% or less for the LEFS to assist with reaching prior level of function with activities such as getting in and out of a car.  [] Progressing: [] Met: [] Not Met: [] Adjusted  2. Patient will demonstrate increased AROM of B ankle DF to 10 degrees without pain to allow for proper joint functioning to enable patient to ambulate with enough tibial advancement to not snap into recurvatum.   [] Progressing: [] Met: [] Not Met: [] Adjusted  3. Patient will demonstrate increased Strength of BLE KE/KF/HF/HAB to at least 4+/5 throughout without pain to allow for proper functional mobility to enable patient to return to perform sit to stand out of a chair 5x w/o hands.   [] Progressing: [] Met: [] Not Met: [] Adjusted  4. Patient will return to be able to ascend  5 steps w 1 rail without increased symptoms or restriction.   [] Progressing: [] Met: [] Not Met: [] Adjusted  5. Pt will be weaned from rolling walker to cane for household distances and community distances less than 75 feet. (patient specific functional goal)    [] Progressing: [] Met: [] Not Met: [] Adjusted     Overall Progression Towards Functional goals/ Treatment Progress Update:  [] Patient is progressing as expected towards functional goals listed.    [] Progression is slowed due to complexities/Impairments listed.  [] Progression has been slowed due to co-morbidities.  [x] Plan just implemented, too soon (<30days) to assess goals

## 2024-10-18 ENCOUNTER — HOSPITAL ENCOUNTER (OUTPATIENT)
Dept: PHYSICAL THERAPY | Age: 64
Setting detail: THERAPIES SERIES
Discharge: HOME | End: 2024-10-18
Attending: ORTHOPAEDIC SURGERY
Payer: MEDICARE

## 2024-10-18 PROCEDURE — 97112 NEUROMUSCULAR REEDUCATION: CPT

## 2024-10-18 PROCEDURE — 97110 THERAPEUTIC EXERCISES: CPT

## 2024-10-18 NOTE — FLOWSHEET NOTE
stretch 8\" box 5x10s on ea LE Cue for straight foot on RLE with e   Leg press (DL) 60/80 1x10 60#  1x10 80#    Leg press (up 2,down 1) 60 1x10 ea LE Slight dizzy when getting up, but able to resolve. Pt reports she has an issue holding her breath               Bike  5mins    Manual Intervention (26264)      R ankle distraction, STJ mob inv/ever, TA passive stretching  5m                            Therapeutic Activity (08064)                                        Modalities:    Cold Pack R ankle, L quad x 8 mins    Education/Home Exercise Program: Patient HEP program created electronically.  Refer to [employer] access code:      Access Code: [email or web address]  URL: [email or web address]  Date: 10/08/2024  Prepared by: Amelia Teague    Exercises  - Seated Hamstring Stretch  - 2 x daily - 5 reps - 30sec hold  - Seated Calf Stretch with Strap  - 2 x daily - 5 reps - 30sec hold  - Seated Ankle Plantarflexion with Resistance  - 2 x daily - 5 reps - 30sec hold  - Seated Long Arc Quad  - 2 x daily - 3 sets - 10 reps - 5sec hold  - Seated Hip Adduction Squeeze with Ball  - 2 x daily - 2 sets - 10 reps - 5sec hold    ASSESSMENT   Today's Assessment:  Radha presents with stable presentation today and was able to make progressions with LE strength. She did require manual therapy initially to R ankle with TibA tightness and TCJ stiffness that was alleviated. Then, progressions were made with leg press, seated HR, standing knee flexion stretch, and inc resistance on bike. Cryotherapy continued at end of session.     Patient will benefit from continued skilled intervention to increase ROM, flexibility, strength and function.    Medical Necessity Documentation:  I certify that this patient meets the below criteria necessary for medical necessity for care and/or justification of therapy services:  The patient has functional impairments and/or activity limitations and would benefit from continued outpatient therapy services to address

## 2024-10-22 ENCOUNTER — HOSPITAL ENCOUNTER (OUTPATIENT)
Dept: PHYSICAL THERAPY | Age: 64
Setting detail: THERAPIES SERIES
Discharge: HOME | End: 2024-10-22
Attending: ORTHOPAEDIC SURGERY
Payer: MEDICARE

## 2024-10-22 PROCEDURE — 97110 THERAPEUTIC EXERCISES: CPT | Performed by: PHYSICAL THERAPIST

## 2024-10-22 NOTE — FLOWSHEET NOTE
[facility]- Outpatient Rehabilitation and Therapy  [address], [place] [address] office: [phone] fax: [phone]       Physical Therapy: TREATMENT/PROGRESS NOTE   Patient: Radha Malin (63 y.o. female)   Examination Date: 10/22/2024   :  1960 MRN: [id number]   Visit #:    through 24  Insurance Allowable Auth Needed     Authorization Submission Information:    Approval dates: 10/8/24 - 24  Approved visits: 7  Approved codes: 81475,[id number],87417,55803,18559,20912  Codes that DO NOT require auth: NA    Denied codes: NA    Did auth pend? NO  Authorization #: [id number]     Comments: NO  []Yes    [x]No    Insurance: Payor: [facility] MEDICARE / Plan: ANTHEM MEDIBLUE ESSENTIAL/PLUS / Product Type: *No Product type* /   Insurance ID: [id number] - (Medicare Managed)  Secondary Insurance (if applicable):    Treatment Diagnosis:     ICD-10-CM    1. Difficulty walking  R26.2       2. Left leg pain  M79.605       3. Balance problems  R26.89       4. Muscle weakness of lower extremity  M62.81          Medical Diagnosis:  Pathological fracture in other disease, other site, initial encounter for fracture [M84.68XA]   Referring Physician: Vasile Antunez MD  PCP: Tererll Perez DO     Plan of care signed (Y/N): YES    Date of Patient follow up with Physician: 3/12/25     Plan of Care Report: NO  POC update due: (10 visits /OR AUTH LIMITS, whichever is less)  2024                                             Medical History:  Comorbidities:  Cancer/Tumor  Osteoporosis/Osteopenia  Osteoarthritis  Anxiety  Anemia  High cholesterol  Relevant Medical History: R ankle fx  w hardware. Hardware removal R ankle 2023.  Previous L foot fx x2   B hand OA  Pt has a hx of possible pulled HS at attachment                                         Precautions/ Contra-indications:           Latex allergy:  NO  Pacemaker:    NO  Contraindications for Manipulation: None, recent

## 2024-10-25 ENCOUNTER — HOSPITAL ENCOUNTER (OUTPATIENT)
Dept: PHYSICAL THERAPY | Age: 64
Setting detail: THERAPIES SERIES
Discharge: HOME | End: 2024-10-25
Attending: ORTHOPAEDIC SURGERY
Payer: MEDICARE

## 2024-10-25 PROCEDURE — 97110 THERAPEUTIC EXERCISES: CPT

## 2024-10-25 NOTE — PLAN OF CARE
reassessments and prior notes for documentation efficiency.    Note: If patient does not return for scheduled/recommended follow up visits, this note will serve as a discharge from care along with the most recent update on progress.    Ortho Evaluation

## 2024-10-29 ENCOUNTER — HOSPITAL ENCOUNTER (OUTPATIENT)
Dept: PHYSICAL THERAPY | Age: 64
Setting detail: THERAPIES SERIES
Discharge: HOME | End: 2024-10-29
Attending: ORTHOPAEDIC SURGERY
Payer: MEDICARE

## 2024-10-29 PROCEDURE — 97140 MANUAL THERAPY 1/> REGIONS: CPT | Performed by: PHYSICAL THERAPIST

## 2024-10-29 PROCEDURE — 97110 THERAPEUTIC EXERCISES: CPT | Performed by: PHYSICAL THERAPIST

## 2024-10-29 NOTE — FLOWSHEET NOTE
[facility]- Outpatient Rehabilitation and Therapy  [address], [place] [address] office: [phone] fax: [phone]       Physical Therapy: TREATMENT/PROGRESS NOTE   Patient: Radha Malin (63 y.o. female)   Examination Date: 10/29/2024   :  1960 MRN: [id number]   Visit #:   through 24 through 24  Insurance Allowable Auth Needed     Authorization Submission Information:    Approval dates: 10/8/24 - 24  Approved visits: 7  Approved codes: 35029,63768,54957,13798,16785,92795  Codes that DO NOT require auth: NA    Denied codes: NA    Did auth pend? NO  Authorization #: [id number]     Comments: NO  []Yes    [x]No    LAUREL Malin  4 ADDL VISITS - 10-29-24 - 24 - CARELON.pdf   10/25/2024       Insurance: Payor: [facility] MEDICARE / Plan: [employer] ESSENTIAL/PLUS / Product Type: *No Product type* /   Insurance ID: [id number] - (Medicare Managed)  Secondary Insurance (if applicable):    Treatment Diagnosis:     ICD-10-CM    1. Difficulty walking  R26.2       2. Left leg pain  M79.605       3. Balance problems  R26.89       4. Muscle weakness of lower extremity  M62.81          Medical Diagnosis:  Pathological fracture in other disease, other site, initial encounter for fracture [M84.68XA]   Referring Physician: Vasile Antunez MD  PCP: Terrell Perez DO     Plan of care signed (Y/N): YES    Date of Patient follow up with Physician: 3/12/25     Plan of Care Report: NO  POC update due: (10 visits /OR AUTH LIMITS, whichever is less)  2024                                             Medical History:  Comorbidities:  Cancer/Tumor  Osteoporosis/Osteopenia  Osteoarthritis  Anxiety  Anemia  High cholesterol  Relevant Medical History: R ankle fx  w hardware. Hardware removal R ankle 2023.  Previous L foot fx x2   B hand OA  Pt has a hx of possible pulled HS at attachment                                         Precautions/ Contra-indications:

## 2024-11-01 ENCOUNTER — HOSPITAL ENCOUNTER (OUTPATIENT)
Dept: PHYSICAL THERAPY | Age: 64
Setting detail: THERAPIES SERIES
Discharge: HOME | End: 2024-11-01
Attending: ORTHOPAEDIC SURGERY
Payer: MEDICARE

## 2024-11-01 PROCEDURE — 97110 THERAPEUTIC EXERCISES: CPT

## 2024-11-01 PROCEDURE — 97140 MANUAL THERAPY 1/> REGIONS: CPT

## 2024-11-01 NOTE — FLOWSHEET NOTE
[facility]- Outpatient Rehabilitation and Therapy  [address], [place] [address] office: [phone] fax: [phone]       Physical Therapy: TREATMENT/PROGRESS NOTE   Patient: Radha Malin (63 y.o. female)   Examination Date: 2024   :  1960 MRN: [id number]   Visit #:   through 24 through 24  Insurance Allowable Auth Needed     Authorization Submission Information:    Approval dates: 10/8/24 - 24  Approved visits: 7  Approved codes: 81702,87615,65485,70198,18841,76002  Codes that DO NOT require auth: NA    Denied codes: NA    Did auth pend? NO  Authorization #: [id number]     Comments: NO  []Yes    [x]No    LAUREL Malin  4 ADDL VISITS - 10-29-24 - 24 - CARELON.pdf   10/25/2024       Insurance: Payor: [facility] MEDICARE / Plan: [employer] ESSENTIAL/PLUS / Product Type: *No Product type* /   Insurance ID: [id number] - (Medicare Managed)  Secondary Insurance (if applicable):    Treatment Diagnosis:     ICD-10-CM    1. Difficulty walking  R26.2       2. Left leg pain  M79.605       3. Balance problems  R26.89       4. Muscle weakness of lower extremity  M62.81          Medical Diagnosis:  Pathological fracture in other disease, other site, initial encounter for fracture [M84.68XA]   Referring Physician: Vasile Antunez MD  PCP: Terrell Perez DO     Plan of care signed (Y/N): YES    Date of Patient follow up with Physician: 3/12/25     Plan of Care Report: NO  POC update due: (10 visits /OR AUTH LIMITS, whichever is less)  2024                                             Medical History:  Comorbidities:  Cancer/Tumor  Osteoporosis/Osteopenia  Osteoarthritis  Anxiety  Anemia  High cholesterol  Relevant Medical History: R ankle fx  w hardware. Hardware removal R ankle 2023.  Previous L foot fx x2   B hand OA  Pt has a hx of possible pulled HS at attachment                                         Precautions/ Contra-indications:

## 2024-11-05 ENCOUNTER — HOSPITAL ENCOUNTER (OUTPATIENT)
Dept: PHYSICAL THERAPY | Age: 64
Setting detail: THERAPIES SERIES
Discharge: HOME | End: 2024-11-05
Attending: ORTHOPAEDIC SURGERY
Payer: MEDICARE

## 2024-11-05 PROCEDURE — 97112 NEUROMUSCULAR REEDUCATION: CPT | Performed by: PHYSICAL THERAPIST

## 2024-11-05 PROCEDURE — 97110 THERAPEUTIC EXERCISES: CPT | Performed by: PHYSICAL THERAPIST

## 2024-11-05 NOTE — FLOWSHEET NOTE
[facility]- Outpatient Rehabilitation and Therapy  [address], [place] [address] office: [phone] fax: [phone]       Physical Therapy: TREATMENT/PROGRESS NOTE   Patient: Radha Malin (64 y.o. female)   Examination Date: 2024   :  1960 MRN: [id number]   Visit #:   through 24  3/ through 24  Insurance Allowable Auth Needed     Authorization Submission Information:    Approval dates: 10/8/24 - 24  Approved visits: 7  Approved codes: 49459,54624,29177,09607,05844,07078  Codes that DO NOT require auth: NA    Denied codes: NA    Did auth pend? NO  Authorization #: [id number]     Comments: NO  []Yes    [x]No    LAUREL Malin  4 ADDL VISITS - 10-29-24 - 24 - CARELON.pdf   10/25/2024     Pt changing to medicare now and is bringing in cards    Insurance: Payor: [facility] MEDICARE / Plan: [employer] ESSENTIAL/PLUS / Product Type: *No Product type* /   Insurance ID: [id number] - (Medicare Managed)  Secondary Insurance (if applicable):    Treatment Diagnosis:     ICD-10-CM    1. Difficulty walking  R26.2       2. Left leg pain  M79.605       3. Balance problems  R26.89       4. Muscle weakness of lower extremity  M62.81          Medical Diagnosis:  Pathological fracture in other disease, other site, initial encounter for fracture [M84.68XA]   Referring Physician: Vasile Antunez MD  PCP: Terrell Perez DO     Plan of care signed (Y/N): YES    Date of Patient follow up with Physician: 3/12/25     Plan of Care Report: NO  POC update due: (10 visits /OR AUTH LIMITS, whichever is less)  2024                                             Medical History:  Comorbidities:  Cancer/Tumor  Osteoporosis/Osteopenia  Osteoarthritis  Anxiety  Anemia  High cholesterol  Relevant Medical History: R ankle fx  w hardware. Hardware removal R ankle 2023.  Previous L foot fx x2   B hand OA  Pt has a hx of possible pulled HS at attachment

## 2024-11-12 ENCOUNTER — HOSPITAL ENCOUNTER (OUTPATIENT)
Dept: PHYSICAL THERAPY | Age: 64
Setting detail: THERAPIES SERIES
Discharge: HOME | End: 2024-11-12
Attending: ORTHOPAEDIC SURGERY
Payer: MEDICARE

## 2024-11-12 PROCEDURE — 97110 THERAPEUTIC EXERCISES: CPT | Performed by: PHYSICAL THERAPIST

## 2024-11-12 PROCEDURE — 97112 NEUROMUSCULAR REEDUCATION: CPT | Performed by: PHYSICAL THERAPIST

## 2024-11-12 NOTE — FLOWSHEET NOTE
3 sets - 10 reps  - Supine Posterior Pelvic Tilt  - 1 x daily - 2 sets - 10 reps - 3secs hold  - Supine Hip Adduction Isometric with Ball  - 1 x daily - 10 reps - 10secs hold  - Clamshell  - 1 x daily - 7 x weekly - 10 reps - 2-3s hold  - Sidelying Reverse Clamshell  - 1 x daily - 7 x weekly - 3 sets - 10 reps - 2-3s hold  - Supine ITB Stretch  - 1 x daily - 7 x weekly - 10 reps - 10s hold  - Supine Bridge  - 1 x daily - 7 x weekly - 10 reps - 2-3s hold    ASSESSMENT     Today's Assessment:  Todays' session was focused on hip and LE strength in a seated position due to pt feeling slightly unstable and fatigued at onset.    Patient will benefit from continued skilled intervention to increase ROM, flexibility, strength and function.    Medical Necessity Documentation:  I certify that this patient meets the below criteria necessary for medical necessity for care and/or justification of therapy services:  The patient has functional impairments and/or activity limitations and would benefit from continued outpatient therapy services to address the deficits outlined in the patients goals  The patient has a musculoskeletal condition(s) with a corresponding ICD-10 code that is of complexity and severity that require skilled therapeutic intervention. This has a direct and significant impact on the need for therapy and significantly impacts the rate of recovery.     Return to Play: NA    Prognosis for POC: [x] Good [] Fair  [] Poor    Patient requires continued skilled intervention: [x] Yes  [] No      CHARGE CAPTURE     PT CHARGE GRID   22372, 61657, 38487, 92427, 65070, 31719, 23376, 56334, 94997, 11934, 32724, 25965, 87774, 10584, 08592    CPT Code (TIMED) minutes # CPT Code (UNTIMED) #     Therex (99091)  35 2  EVAL:LOW (71698 - Typically 20 minutes face-to-face)     Neuromusc. Re-ed (48774) 4 1  Re-Eval (85429)     Manual (67284)    Estim Unattended (10294)     Ther. Act (94546)    Mech. Traction (60401)     Gait (11551)

## 2024-11-19 ENCOUNTER — HOSPITAL ENCOUNTER (OUTPATIENT)
Dept: PHYSICAL THERAPY | Age: 64
Setting detail: THERAPIES SERIES
Discharge: HOME | End: 2024-11-19
Attending: ORTHOPAEDIC SURGERY
Payer: MEDICARE

## 2024-11-19 PROCEDURE — 97110 THERAPEUTIC EXERCISES: CPT | Performed by: PHYSICAL THERAPIST

## 2024-11-19 NOTE — FLOWSHEET NOTE
[facility]- Outpatient Rehabilitation and Therapy  [address], [place] [address] office: [phone] fax: [phone]       Physical Therapy: TREATMENT/PROGRESS NOTE   Patient: Radha Malin (64 y.o. female)   Examination Date: 2024   :  1960 MRN: [id number]   Visit #:   through 24/ through 24    Medicare visits: 1      Insurance Allowable Auth Needed     Authorization Submission Information:    Approval dates: 10/8/24 - 24  Approved visits: 7  Approved codes: 73853,19883,62352,17927,99887,48529  Codes that DO NOT require auth: NA    Denied codes: NA    Did auth pend? NO  Authorization #: [id number]     Comments: NO  []Yes    [x]No    LAUREL Malin  4 ADDL VISITS - 10-29-24 - 24 - PHILLIP.pdf   10/25/2024         Insurance: Payor: [facility] MEDICARE / Plan: [employer] ESSENTIAL/PLUS / Product Type: *No Product type* /   Insurance ID: [id number] - (Medicare Managed)  Secondary Insurance (if applicable):         24 requested more visit from [facility]. Pt states she has traditional MC but has not provided any card or MC number as of yet.    24 [place]   Treatment Diagnosis:     ICD-10-CM    1. Difficulty walking  R26.2       2. Left leg pain  M79.605       3. Balance problems  R26.89       4. Muscle weakness of lower extremity  M62.81          Medical Diagnosis:  Pathological fracture in other disease, other site, initial encounter for fracture [M84.68XA]   Referring Physician: Vasile Antunez MD  PCP: Terrell Perez DO     Plan of care signed (Y/N): YES    Date of Patient follow up with Physician: 3/12/25     Plan of Care Report: NO  POC update due: (10 visits /OR AUTH LIMITS, whichever is less)  2024                                             Medical History:  Comorbidities:  Cancer/Tumor  Osteoporosis/Osteopenia  Osteoarthritis  Anxiety  Anemia  High cholesterol  Relevant

## 2024-11-22 ENCOUNTER — HOSPITAL ENCOUNTER (OUTPATIENT)
Dept: PHYSICAL THERAPY | Age: 64
Setting detail: THERAPIES SERIES
Discharge: HOME | End: 2024-11-22
Attending: ORTHOPAEDIC SURGERY
Payer: MEDICARE

## 2024-11-22 PROCEDURE — 97140 MANUAL THERAPY 1/> REGIONS: CPT

## 2024-11-22 PROCEDURE — 97110 THERAPEUTIC EXERCISES: CPT

## 2024-11-22 PROCEDURE — 97112 NEUROMUSCULAR REEDUCATION: CPT

## 2024-11-22 NOTE — PLAN OF CARE
w/o hands.   [x] Progressing: [] Met: [] Not Met: [] Adjusted  4. Patient will return to be able to ascend  5 steps w 1 rail without increased symptoms or restriction.   [x] Progressing: [] Met: [] Not Met: [] Adjusted  5. Pt will be weaned from rolling walker to cane for household distances and community distances less than 75 feet. (patient specific functional goal)    [x] Progressing: [] Met: [] Not Met: [] Adjusted     Overall Progression Towards Functional goals/ Treatment Progress Update:  [] Patient is progressing as expected towards functional goals listed.    [x] Progression is slowed due to complexities/Impairments listed.  [] Progression has been slowed due to co-morbidities.  [] Plan just implemented, too soon (<30days) to assess goals progression   [] Goals require adjustment due to lack of progress  [] Patient is not progressing as expected and requires additional follow up with physician  [] Other:     TREATMENT PLAN     Frequency/Duration: 1-2x/week for 8-10 weeks from the POC update assessment 11/12/24      Plan:  Continue w strength and endurance to achieve functional IND     Electronically Signed by Catalino Downey PT                                                      Date: 11/22/2024     Note: Portions of this note have been templated and/or copied from initial evaluation, reassessments and prior notes for documentation efficiency.    Note: If patient does not return for scheduled/recommended follow up visits, this note will serve as a discharge from care along with the most recent update on progress.    Ortho Evaluation

## 2024-11-26 ENCOUNTER — HOSPITAL ENCOUNTER (OUTPATIENT)
Dept: PHYSICAL THERAPY | Age: 64
Setting detail: THERAPIES SERIES
Discharge: HOME | End: 2024-11-26
Attending: ORTHOPAEDIC SURGERY
Payer: MEDICARE

## 2024-11-26 PROCEDURE — 97110 THERAPEUTIC EXERCISES: CPT | Performed by: PHYSICAL THERAPIST

## 2024-11-26 NOTE — PLAN OF CARE
Progressing: [] Met: [] Not Met: [] Adjusted     Overall Progression Towards Functional goals/ Treatment Progress Update:  [] Patient is progressing as expected towards functional goals listed.    [x] Progression is slowed due to complexities/Impairments listed.  [] Progression has been slowed due to co-morbidities.  [] Plan just implemented, too soon (<30days) to assess goals progression   [] Goals require adjustment due to lack of progress  [] Patient is not progressing as expected and requires additional follow up with physician  [] Other:     TREATMENT PLAN     Frequency/Duration: 1-2x/week for 8-10 weeks from the POC update assessment 11/12/24      Plan:  Continue w strength and endurance to achieve functional indipendance     Electronically Signed by Amelia Teague, PT                 [id number]                                     Date: 11/26/2024     Note: Portions of this note have been templated and/or copied from initial evaluation, reassessments and prior notes for documentation efficiency.    Note: If patient does not return for scheduled/recommended follow up visits, this note will serve as a discharge from care along with the most recent update on progress.    Ortho Evaluation

## 2024-12-03 ENCOUNTER — HOSPITAL ENCOUNTER (OUTPATIENT)
Dept: PHYSICAL THERAPY | Age: 64
Setting detail: THERAPIES SERIES
Discharge: HOME | End: 2024-12-03
Attending: ORTHOPAEDIC SURGERY
Payer: MEDICARE

## 2024-12-03 PROCEDURE — 97110 THERAPEUTIC EXERCISES: CPT | Performed by: PHYSICAL THERAPIST

## 2024-12-03 PROCEDURE — 97112 NEUROMUSCULAR REEDUCATION: CPT | Performed by: PHYSICAL THERAPIST

## 2024-12-03 NOTE — PLAN OF CARE
[facility]- Outpatient Rehabilitation and Therapy  [address], [place] [address] office: [phone] fax: [phone]     Physical Therapy: TREATMENT/PROGRESS NOTE   Patient: Radha Malin (64 y.o. female)   Examination Date: 2024   :  1960 MRN: [id number]   Visit #:   through 24/ through 24    Medicare visits: 4      Insurance Allowable Auth Needed     Authorization Submission Information:    Approval dates: 10/8/24 - 24  Approved visits: 7  Approved codes: 26407,81506,59717,93897,90749,25653  Codes that DO NOT require auth: NA    Denied codes: NA    Did auth pend? NO  Authorization #: [id number]     Comments: NO  []Yes    [x]No    LAUREL Malin  4 ADDL VISITS - 10-29-24 - 24 - [facility]   10/25/2024         Insurance: Payor: MEDICARE / Plan: MEDICARE PART A AND B / Product Type: *No Product type* /   Insurance ID: [id number] - (Medicare)  Secondary Insurance (if applicable): [place]        24 requested more visit from [facility]. Pt states she has traditional MC but has not provided any card or MC number as of yet.    24 [place]   Treatment Diagnosis:     ICD-10-CM    1. Difficulty walking  R26.2       2. Left leg pain  M79.605       3. Balance problems  R26.89       4. Muscle weakness of lower extremity  M62.81          Medical Diagnosis:  Pathological fracture in other disease, other site, initial encounter for fracture [M84.68XA]   Referring Physician: Vasile Antunez MD  PCP: Terrell Perez DO     Plan of care signed (Y/N): YES    Date of Patient follow up with Physician: 3/12/25     Plan of Care Report: NO  POC update due: (10 visits /OR AUTH LIMITS, whichever is less)  24                                             Medical History:  Comorbidities:  Cancer/Tumor  Osteoporosis/Osteopenia  Osteoarthritis  Anxiety  Anemia  High cholesterol  Relevant Medical History: DEBO

## 2024-12-06 ENCOUNTER — HOSPITAL ENCOUNTER (OUTPATIENT)
Dept: PHYSICAL THERAPY | Age: 64
Setting detail: THERAPIES SERIES
Discharge: HOME | End: 2024-12-06
Attending: ORTHOPAEDIC SURGERY
Payer: MEDICARE

## 2024-12-06 PROCEDURE — 97110 THERAPEUTIC EXERCISES: CPT

## 2024-12-06 PROCEDURE — 97112 NEUROMUSCULAR REEDUCATION: CPT

## 2024-12-06 NOTE — FLOWSHEET NOTE
[facility]- Outpatient Rehabilitation and Therapy  [address], [place] [address] office: [phone] fax: [phone]     Physical Therapy: TREATMENT/PROGRESS NOTE   Patient: Radha Malin (64 y.o. female)   Examination Date: 2024   :  1960 MRN: [id number]   Visit #: 15 /11  through 24  4/ through 24    Medicare visits: 5      Insurance Allowable Auth Needed     Authorization Submission Information:    Approval dates: 10/8/24 - 24  Approved visits: 7  Approved codes: 47499,72940,99843,82786,56725,25963  Codes that DO NOT require auth: NA    Denied codes: NA    Did auth pend? NO  Authorization #: [id number]     Comments: NO  []Yes    [x]No    LAUREL Malin  4 ADDL VISITS - 10-29-24 - 24 [facility]   10/25/2024         Insurance: Payor: MEDICARE / Plan: MEDICARE PART A AND B / Product Type: *No Product type* /   Insurance ID: [id number] - (Medicare)  Secondary Insurance (if applicable): [place]        24 requested more visit from [facility]. Pt states she has traditional MC but has not provided any card or MC number as of yet.    24 [place]   Treatment Diagnosis:     ICD-10-CM    1. Difficulty walking  R26.2       2. Left leg pain  M79.605       3. Balance problems  R26.89       4. Muscle weakness of lower extremity  M62.81          Medical Diagnosis:  Pathological fracture in other disease, other site, initial encounter for fracture [M84.68XA]   Referring Physician: Vasile Antunez MD  PCP: Terrell Perez DO     Plan of care signed (Y/N): YES    Date of Patient follow up with Physician: 3/12/25     Plan of Care Report: NO  POC update due: (10 visits /OR AUTH LIMITS, whichever is less)  24                                             Medical History:  Comorbidities:  Cancer/Tumor  Osteoporosis/Osteopenia  Osteoarthritis  Anxiety  Anemia  High cholesterol  Relevant Medical History: DEBO

## 2024-12-12 ENCOUNTER — HOSPITAL ENCOUNTER (OUTPATIENT)
Dept: PHYSICAL THERAPY | Age: 64
Setting detail: THERAPIES SERIES
Discharge: HOME | End: 2024-12-12
Attending: ORTHOPAEDIC SURGERY
Payer: MEDICARE

## 2024-12-12 PROCEDURE — 97112 NEUROMUSCULAR REEDUCATION: CPT | Performed by: PHYSICAL THERAPIST

## 2024-12-12 PROCEDURE — 97110 THERAPEUTIC EXERCISES: CPT | Performed by: PHYSICAL THERAPIST

## 2024-12-12 NOTE — FLOWSHEET NOTE
[facility]- Outpatient Rehabilitation and Therapy  [address], [place] [address] office: [phone] fax: [phone]     Physical Therapy: TREATMENT/PROGRESS NOTE   Patient: Radha Malin (64 y.o. female)   Examination Date: 2024   :  1960 MRN: [id number]   Visit #:   through 24  4/ through 24    Medicare visits: 5      Insurance Allowable Auth Needed     Authorization Submission Information:    Approval dates: 10/8/24 - 24  Approved visits: 7  Approved codes: 41371,88050,76378,53040,61225,84482  Codes that DO NOT require auth: NA    Denied codes: NA    Did auth pend? NO  Authorization #: [id number]     Comments: NO  []Yes    [x]No    LAUREL Malin  4 ADDL VISITS - 10-29-24 - 24 - [facility]   10/25/2024         Insurance: Payor: MEDICARE / Plan: MEDICARE PART A AND B / Product Type: *No Product type* /   Insurance ID: [id number] - (Medicare)  Secondary Insurance (if applicable): [place]        24 requested more visit from [facility]. Pt states she has traditional MC but has not provided any card or MC number as of yet.    24 [place]   Treatment Diagnosis:     ICD-10-CM    1. Difficulty walking  R26.2       2. Left leg pain  M79.605       3. Balance problems  R26.89       4. Muscle weakness of lower extremity  M62.81          Medical Diagnosis:  Pathological fracture in other disease, other site, initial encounter for fracture [M84.68XA]   Referring Physician: Vasile Antunez MD  PCP: Terrell Perez DO     Plan of care signed (Y/N): YES    Date of Patient follow up with Physician: 3/12/25     Plan of Care Report: NO  POC update due: (10 visits /OR AUTH LIMITS, whichever is less)  24                                             Medical History:  Comorbidities:  Cancer/Tumor  Osteoporosis/Osteopenia  Osteoarthritis  Anxiety  Anemia  High cholesterol  Relevant Medical History: DEBO

## 2024-12-17 ENCOUNTER — APPOINTMENT (OUTPATIENT)
Dept: PHYSICAL THERAPY | Age: 64
End: 2024-12-17
Attending: ORTHOPAEDIC SURGERY
Payer: MEDICARE

## 2024-12-20 ENCOUNTER — HOSPITAL ENCOUNTER (OUTPATIENT)
Dept: PHYSICAL THERAPY | Age: 64
Setting detail: THERAPIES SERIES
Discharge: HOME | End: 2024-12-20
Attending: ORTHOPAEDIC SURGERY
Payer: MEDICARE

## 2024-12-20 PROCEDURE — 97110 THERAPEUTIC EXERCISES: CPT

## 2024-12-20 PROCEDURE — 97112 NEUROMUSCULAR REEDUCATION: CPT

## 2024-12-20 NOTE — FLOWSHEET NOTE
10 reps - 2-3s hold  - Sidelying Reverse Clamshell  - 1 x daily - 7 x weekly - 3 sets - 10 reps - 2-3s hold  - Supine ITB Stretch  - 1 x daily - 7 x weekly - 10 reps - 10s hold  - Supine Bridge  - 1 x daily - 7 x weekly - 10 reps - 2-3s hold    ASSESSMENT     Today's Assessment:  Radha had a visit focused on sacral alleviation after still having inc soreness from an inj on Monday. She did identify relief with sacral gapping of ball squeeze (hip add activity) as well as able to complete bike and standing STS or SLR 3 way stepouts with BUE support. She does still have cardio and muscular endurance issues but is improving to tolerance with less rest breaks required.     Patient will benefit from continued skilled intervention to increase ROM, flexibility, strength and function.    Medical Necessity Documentation:  I certify that this patient meets the below criteria necessary for medical necessity for care and/or justification of therapy services:  The patient has functional impairments and/or activity limitations and would benefit from continued outpatient therapy services to address the deficits outlined in the patients goals  The patient has a musculoskeletal condition(s) with a corresponding ICD-10 code that is of complexity and severity that require skilled therapeutic intervention. This has a direct and significant impact on the need for therapy and significantly impacts the rate of recovery.     Return to Play: NA    Prognosis for POC: [x] Good [] Fair  [] Poor    Patient requires continued skilled intervention: [x] Yes  [] No      CHARGE CAPTURE     PT CHARGE GRID   93195, 10366, 43524, 45521, 96664, 77039, 21514, 73088, 51396, 12636, 94130, 58146, 42868, 41165, 47058    CPT Code (TIMED) minutes # CPT Code (UNTIMED) #     Therex (82524)  23 2  EVAL:LOW (40776 - Typically 20 minutes face-to-face)     Neuromusc. Re-ed (21219) 15 1  Re-Eval (77705)     Manual (34064)    Estim Unattended (12565)     Ther. Act

## 2024-12-26 ENCOUNTER — HOSPITAL ENCOUNTER (OUTPATIENT)
Dept: PHYSICAL THERAPY | Age: 64
Setting detail: THERAPIES SERIES
Discharge: HOME | End: 2024-12-26
Attending: ORTHOPAEDIC SURGERY
Payer: MEDICARE

## 2024-12-26 PROCEDURE — 97110 THERAPEUTIC EXERCISES: CPT | Performed by: PHYSICAL THERAPIST

## 2024-12-26 NOTE — FLOWSHEET NOTE
(16972)     Manual (45561)    Estim Unattended (24535)     Ther. Act (15768)    Mech. Traction (23997)     Gait (09011)    Dry Needle 1-2 muscle (29263)     Aquatic Therex (79536)    Dry Needle 3+ muscle (20561)     Iontophoresis (82046)    VASO (40107)     Ultrasound (93645)    Group Therapy (06253)     Estim Attended (82489)    Canalith Repositioning (01492)     Physical Performance Test (46531)         Other:    Other: x8 cryotherapy    Total Timed Code Tx Minutes 41 3       Total Treatment Minutes 41        Charge Justification:  (18618) THERAPEUTIC EXERCISE - Provided verbal/tactile cueing for activities related to strengthening, flexibility, endurance, ROM performed to prevent loss of range of motion, maintain or improve muscular strength or increase flexibility, following either an injury or surgery.     GOALS     Patient stated goal: to walk again  [] Progressing: [] Met: [] Not Met: [] Adjusted    Therapist goals for Patient:   Short Term Goals: To be achieved in: 2 weeks  (2/2 MET)  1. Independent in HEP and progression per patient tolerance, in order to prevent re-injury.   [] Progressing: [x] Met: [] Not Met: [] Adjusted  2. Patient will have a decrease in pain to <5/10 to facilitate improvement in movement, function, and ADLs as indicated by Functional Deficits.  [] Progressing: [x] Met: [] Not Met: [] Adjusted    Long Term Goals: To be achieved in: 8-12 weeks  1. Disability index score of 50% or less for the LEFS to assist with reaching prior level of function with activities such as getting in and out of a car.  [x] Progressing: [] Met: [] Not Met: [] Adjusted  2. Patient will demonstrate increased AROM of B ankle DF to 10 degrees without pain to allow for proper joint functioning to enable patient to ambulate with enough tibial advancement to not snap into recurvatum.   [x] Progressing: [] Met: [] Not Met: [] Adjusted  3. Patient will demonstrate increased Strength of BLE KE/KF/HF/HAB to at least 
70 y/o F presents to ED s/p slip and fall three steps last night. Pain to forearm, shoulder, Exam:  3 cm superficial abrasion,   compartment soft, tenderness to forearm, normal pulse. Plan: will obtain x-ray to rule out fracture, patient declines medications at this time, tetanus up to date.

## 2024-12-31 ENCOUNTER — HOSPITAL ENCOUNTER (OUTPATIENT)
Dept: PHYSICAL THERAPY | Age: 64
Setting detail: THERAPIES SERIES
Discharge: HOME | End: 2024-12-31
Attending: ORTHOPAEDIC SURGERY
Payer: MEDICARE

## 2024-12-31 PROCEDURE — 97112 NEUROMUSCULAR REEDUCATION: CPT | Performed by: PHYSICAL THERAPIST

## 2024-12-31 PROCEDURE — 97110 THERAPEUTIC EXERCISES: CPT | Performed by: PHYSICAL THERAPIST

## 2024-12-31 NOTE — FLOWSHEET NOTE
4+ ext 4 ER 4 IR 4 L hip flex 4- abd 4- ext 3 ER 3 IR 3      Test used Initial score  10/8/24 10/25/24  POC 11/22/24  POC   Pain Summary VAS 6/10 2/10 1-2/20   Functional questionnaire LEFS (11) 86% (7) 91% (13) 84%   AROM              LKE +20      RKE +20      B ADF 5     MMT LHF 4-/5      RKF 4/5      LKF 4-/5      RKF 4+/5      B KE 4+/5      LADF 4+/5      RADF 3+/5      LAPF 5/5 See above     RAPF 4/5           Exercises/Interventions   Per Dr. Antunez note 9/18/24: pt is to continue to increase WBAT and DC boot. Pt to engage in gait training and LE strength.    Therapeutic Ex (53878)   resistance Reps/Sets/time Notes/Cues/Progressions   Pt and  education on the importance of performing HEP daily and getting to the gym to ride the bike and/or do allowed weights as pt needs to perform consistent strengthening to improve and PT sessions are to be focused on function and now balance issues x10'          MC to keep core control   Recumbent bike L6 8' Pt has difficulty w feet slipping out of pedals            Focus control                     LEGS:      Increased #      unable to complete w/o hyperextending knees on stance LE   Increased #   Fatigued  had to decrease reps  Difficulty w ECC control      Pt much less anxious holding bar this visit            Manual Intervention (68413)                               NMR re-education (86084)          Ambulation course: over 2 lines, through cones, over 4\" step  20ft x3 CGA w walker   Sit to stand and standing w SPC on LLE           Smooth pursuit and saccades oculomotor assessment and treatments  x15' Added to HEP                          Therapeutic Activity (45284)                                        Modalities:    No modalities applied this session  declined      Education/Home Exercise Program:  Pt is to go to [employer] and ride bike, do LP  Added vestibular exercises 12/31/24 (seated)    Access Code: [email or web address]  URL:

## 2025-01-03 ENCOUNTER — HOSPITAL ENCOUNTER (OUTPATIENT)
Dept: PHYSICAL THERAPY | Age: 65
Setting detail: THERAPIES SERIES
Discharge: HOME | End: 2025-01-03
Attending: ORTHOPAEDIC SURGERY
Payer: MEDICARE

## 2025-01-03 PROCEDURE — 97110 THERAPEUTIC EXERCISES: CPT

## 2025-01-03 NOTE — FLOWSHEET NOTE
[facility]- Outpatient Rehabilitation and Therapy  [address], [place] [address] office: [phone] fax: [phone]     Physical Therapy: TREATMENT/PROGRESS NOTE   Patient: Radha Malin (64 y.o. female)   Examination Date: 2025   :  1960 MRN: [id number]   Visit #:   through 24 through 24    Medicare visits: since 24      Insurance Allowable Auth Needed     Authorization Submission Information:    Approval dates: 10/8/24 - 24  Approved visits: 7  Approved codes: 34684,91306,66496,99509,44282,73713  Codes that DO NOT require auth: NA    Denied codes: NA    Did auth pend? NO  Authorization #: [id number]     Comments: NO  []Yes    [x]No    LAUREL Malin  4 ADDL VISITS - 10-29-24 - 24 [facility]   10/25/2024         Insurance: Payor: MEDICARE / Plan: MEDICARE PART A AND B / Product Type: *No Product type* /   Insurance ID: [id number] - (Medicare)  Secondary Insurance (if applicable): [place]        24 requested more visit from [facility]. Pt states she has traditional MC but has not provided any card or MC number as of yet.    24 [place] needed   Treatment Diagnosis:     ICD-10-CM    1. Difficulty walking  R26.2       2. Left leg pain  M79.605       3. Balance problems  R26.89       4. Muscle weakness of lower extremity  M62.81          Medical Diagnosis:  Pathological fracture in other disease, other site, initial encounter for fracture [M84.68XA]   Referring Physician: Vasile Antunez MD  PCP: Terrell Perez DO     Plan of care signed (Y/N): YES    Date of Patient follow up with Physician: 3/12/25     Plan of Care Report: NO  POC update due: (10 visits /OR AUTH LIMITS, whichever is less)  1/3/25                                             Medical History:  Comorbidities:  Cancer/Tumor  Osteoporosis/Osteopenia  Osteoarthritis  Anxiety  Anemia  High cholesterol  Relevant Medical

## 2025-01-07 ENCOUNTER — APPOINTMENT (OUTPATIENT)
Dept: PHYSICAL THERAPY | Age: 65
End: 2025-01-07
Attending: ORTHOPAEDIC SURGERY
Payer: MEDICARE

## 2025-01-10 ENCOUNTER — HOSPITAL ENCOUNTER (OUTPATIENT)
Dept: PHYSICAL THERAPY | Age: 65
Setting detail: THERAPIES SERIES
Discharge: HOME | End: 2025-01-10
Attending: ORTHOPAEDIC SURGERY
Payer: MEDICARE

## 2025-01-10 PROCEDURE — 97110 THERAPEUTIC EXERCISES: CPT

## 2025-01-10 NOTE — FLOWSHEET NOTE
Catalino Downey, PT           [id number]                                   Date: 01/10/2025     Note: Portions of this note have been templated and/or copied from initial evaluation, reassessments and prior notes for documentation efficiency.    Note: If patient does not return for scheduled/recommended follow up visits, this note will serve as a discharge from care along with the most recent update on progress.    Ortho Evaluation

## 2025-01-14 ENCOUNTER — HOSPITAL ENCOUNTER (OUTPATIENT)
Dept: PHYSICAL THERAPY | Age: 65
Setting detail: THERAPIES SERIES
Discharge: HOME | End: 2025-01-14
Attending: ORTHOPAEDIC SURGERY
Payer: MEDICARE

## 2025-01-14 PROCEDURE — 97110 THERAPEUTIC EXERCISES: CPT | Performed by: PHYSICAL THERAPIST

## 2025-01-14 PROCEDURE — 97140 MANUAL THERAPY 1/> REGIONS: CPT | Performed by: PHYSICAL THERAPIST

## 2025-01-14 NOTE — PLAN OF CARE
[facility]- Outpatient Rehabilitation and Therapy  [address], [place] [address] office: [phone] fax: [phone]       Physical Therapy Re-Certification Plan of Care    Dear Vasile Antunez MD  ,    We had the pleasure of treating the following patient for physical therapy services at [facility] Outpatient Physical Therapy. A summary of our findings can be found in the updated assessment below.  This includes our plan of care.  If you have any questions or concerns regarding these findings, please do not hesitate to contact me at the office phone number checked above.  Thank you for the referral.     Physician Signature:________________________________Date:__________________  By signing above (or electronic signature), therapist's plan is approved by physician      Total Visits: 22     Overall Response to Treatment:  Patient is responding well to treatment and improvement is noted with regards to goals and response is slow, pt also has concussive s/s possible from her falls affecting balance.     Recommendation:    [x] Continue PT 2x / wk for 8 weeks.   [] Hold PT, pending MD visit   [] Discharge to [facility]. Follow up with PT or MD PRN.      Physical Therapy: TREATMENT/PROGRESS NOTE   Patient: Radha Malin (64 y.o. female)   Examination Date: 2025   :  1960 MRN: [id number]   Visit #:       Medicare visits: since 24      Insurance Allowable Auth Needed     Authorization Submission Information:    Approval dates: 10/8/24 - 24  Approved visits: 7  Approved codes: 08477,17682,82126,70803,23092,11335  Codes that DO NOT require auth: NA    Denied codes: NA    Did auth pend? NO  Authorization #: [id number]     Comments: NO  []Yes    [x]No    LAUREL Malin  4 ADDL VISITS - 10-29-24 - 24 - [facility].pdf   10/25/2024         Insurance: Payor: MEDICARE / Plan: MEDICARE PART A AND B / Product Type: *No Product type* /   Insurance ID: [id number] -

## 2025-01-17 ENCOUNTER — HOSPITAL ENCOUNTER (OUTPATIENT)
Dept: PHYSICAL THERAPY | Age: 65
Setting detail: THERAPIES SERIES
Discharge: HOME | End: 2025-01-17
Attending: ORTHOPAEDIC SURGERY
Payer: MEDICARE

## 2025-01-17 PROCEDURE — 97140 MANUAL THERAPY 1/> REGIONS: CPT

## 2025-01-17 PROCEDURE — 97110 THERAPEUTIC EXERCISES: CPT

## 2025-01-17 NOTE — FLOWSHEET NOTE
[facility]- Outpatient Rehabilitation and Therapy  [address], [place] [address] office: [phone] fax: [phone]       Physical Therapy: TREATMENT/PROGRESS NOTE   Patient: Radha Malin (64 y.o. female)   Examination Date: 2025   :  1960 MRN: [id number]   Visit #:       Medicare visits: since 24      Insurance Allowable Auth Needed     Authorization Submission Information:    Approval dates: 10/8/24 - 24  Approved visits: 7  Approved codes: 83911,82399,18394,66560,24199,43465  Codes that DO NOT require auth: NA    Denied codes: NA    Did auth pend? NO  Authorization #: [id number]     Comments: NO  []Yes    [x]No    LAUREL Malin  4 ADDL VISITS [facility]-29-24 - [facility]   10/25/2024         Insurance: Payor: MEDICARE / Plan: MEDICARE PART A AND B / Product Type: *No Product type* /   Insurance ID: [id number] - (Medicare)  Secondary Insurance (if applicable): [place] BCBS        24 requested more visit from [facility]. Pt states she has traditional MC but has not provided any card or MC number as of yet.    24 [place]   Treatment Diagnosis:     ICD-10-CM    1. Difficulty walking  R26.2       2. Left leg pain  M79.605       3. Balance problems  R26.89       4. Muscle weakness of lower extremity  M62.81          Medical Diagnosis:  Pathological fracture in other disease, other site, initial encounter for fracture [M84.68XA]   Referring Physician: Vasile Antunez MD  PCP: Terrell Perez DO     Plan of care signed (Y/N): YES    Date of Patient follow up with Physician: 3/12/25     Plan of Care Report: NO  POC update due: (10 visits /OR AUTH LIMITS, whichever is less)  25                                             Medical History:  Comorbidities:  Cancer/Tumor  Osteoporosis/Osteopenia  Osteoarthritis  Anxiety  Anemia  High cholesterol  Relevant Medical History: R ankle fx  w

## 2025-01-20 ENCOUNTER — HOSPITAL ENCOUNTER (OUTPATIENT)
Dept: PHYSICAL THERAPY | Age: 65
Setting detail: THERAPIES SERIES
Discharge: HOME | End: 2025-01-20
Attending: ORTHOPAEDIC SURGERY
Payer: MEDICARE

## 2025-01-20 PROCEDURE — 97112 NEUROMUSCULAR REEDUCATION: CPT | Performed by: PHYSICAL THERAPIST

## 2025-01-20 NOTE — FLOWSHEET NOTE
[facility]- Outpatient Rehabilitation and Therapy  [address], [place] [address] office: [phone] fax: [phone]       Physical Therapy: TREATMENT/PROGRESS NOTE   Patient: Radha Malin (64 y.o. female)   Examination Date: 2025   :  1960 MRN: [id number]   Visit #:       Medicare visits: since 24      Insurance Allowable Auth Needed     Authorization Submission Information:    Approval dates: 10/8/24 - 24  Approved visits: 7  Approved codes: 37849,06412,33843,45685,67741,90923  Codes that DO NOT require auth: NA    Denied codes: NA    Did auth pend? NO  Authorization #: [id number]     Comments: NO  []Yes    [x]No    LAUREL Malin  4 ADDL VISITS - 10-29-24 - 11-- [facility]   10/25/2024         Insurance: Payor: MEDICARE / Plan: MEDICARE PART A AND B / Product Type: *No Product type* /   Insurance ID: [id number] - (Medicare)  Secondary Insurance (if applicable): [place] BCBS        24 requested more visit from [facility]. Pt states she has traditional MC but has not provided any card or MC number as of yet.    24 [place]   Treatment Diagnosis:     ICD-10-CM    1. Difficulty walking  R26.2       2. Left leg pain  M79.605       3. Balance problems  R26.89       4. Muscle weakness of lower extremity  M62.81          Medical Diagnosis:  Pathological fracture in other disease, other site, initial encounter for fracture [M84.68XA]   Referring Physician: Vasile Antunez MD  PCP: Terrell Perez DO     Plan of care signed (Y/N): YES    Date of Patient follow up with Physician: 3/12/25     Plan of Care Report: NO  POC update due: (10 visits /OR AUTH LIMITS, whichever is less)  25                                             Medical History:  Comorbidities:  Cancer/Tumor  Osteoporosis/Osteopenia  Osteoarthritis  Anxiety  Anemia  High cholesterol  Relevant Medical History: R ankle fx  w

## 2025-01-21 ENCOUNTER — APPOINTMENT (OUTPATIENT)
Dept: PHYSICAL THERAPY | Age: 65
End: 2025-01-21
Attending: ORTHOPAEDIC SURGERY
Payer: MEDICARE

## 2025-01-24 ENCOUNTER — HOSPITAL ENCOUNTER (OUTPATIENT)
Dept: PHYSICAL THERAPY | Age: 65
Setting detail: THERAPIES SERIES
Discharge: HOME | End: 2025-01-24
Attending: ORTHOPAEDIC SURGERY
Payer: MEDICARE

## 2025-01-24 PROCEDURE — 97110 THERAPEUTIC EXERCISES: CPT

## 2025-01-24 PROCEDURE — 97112 NEUROMUSCULAR REEDUCATION: CPT

## 2025-01-24 NOTE — FLOWSHEET NOTE
weekly    Plan: Cont POC- Continue emphasis/focus on exercise progression, static and dynamic balance, and kinesthetic sense and proprioception. Next visit plan to continue current phase     Electronically Signed by Catalino Downey, PT          [id number]                                  Date: 01/24/2025     Note: Portions of this note have been templated and/or copied from initial evaluation, reassessments and prior notes for documentation efficiency.    Note: If patient does not return for scheduled/recommended follow up visits, this note will serve as a discharge from care along with the most recent update on progress.    Ortho Evaluation

## 2025-01-28 ENCOUNTER — HOSPITAL ENCOUNTER (OUTPATIENT)
Dept: PHYSICAL THERAPY | Age: 65
Setting detail: THERAPIES SERIES
Discharge: HOME | End: 2025-01-28
Attending: ORTHOPAEDIC SURGERY
Payer: MEDICARE

## 2025-01-28 PROCEDURE — 97112 NEUROMUSCULAR REEDUCATION: CPT | Performed by: PHYSICAL THERAPIST

## 2025-01-28 PROCEDURE — 97110 THERAPEUTIC EXERCISES: CPT | Performed by: PHYSICAL THERAPIST

## 2025-01-28 NOTE — FLOWSHEET NOTE
[facility]- Outpatient Rehabilitation and Therapy  [address], [place] [address] office: [phone] fax: [phone]       Physical Therapy: TREATMENT/PROGRESS NOTE   Patient: Radha Malin (64 y.o. female)   Examination Date: 2025   :  1960 MRN: [id number]   Visit #:       Medicare visits: since 24      Insurance Allowable Auth Needed     Authorization Submission Information:    Approval dates: 10/8/24 - 24  Approved visits: 7  Approved codes: 66166,49631,46437,51975,95185,85722  Codes that DO NOT require auth: NA    Denied codes: NA    Did auth pend? NO  Authorization #: [id number]     Comments: NO  []Yes    [x]No    LAUREL Malin  4 ADDL VISITS - 10-29-24 - 11-- [facility]   10/25/2024         Insurance: Payor: MEDICARE / Plan: MEDICARE PART A AND B / Product Type: *No Product type* /   Insurance ID: [id number] - (Medicare)  Secondary Insurance (if applicable): [place] BCBS        24 requested more visit from [facility]. Pt states she has traditional MC but has not provided any card or MC number as of yet.    24 [place]   Treatment Diagnosis:     ICD-10-CM    1. Difficulty walking  R26.2       2. Left leg pain  M79.605       3. Balance problems  R26.89       4. Muscle weakness of lower extremity  M62.81          Medical Diagnosis:  Pathological fracture in other disease, other site, initial encounter for fracture [M84.68XA]   Referring Physician: Vasile Antunez MD  PCP: Terrell Perez DO     Plan of care signed (Y/N): YES    Date of Patient follow up with Physician: 3/12/25     Plan of Care Report: NO  POC update due: (10 visits /OR AUTH LIMITS, whichever is less)  25                                             Medical History:  Comorbidities:  Cancer/Tumor  Osteoporosis/Osteopenia  Osteoarthritis  Anxiety  Anemia  High cholesterol  Relevant Medical History: R ankle fx  w

## 2025-01-31 ENCOUNTER — HOSPITAL ENCOUNTER (OUTPATIENT)
Dept: PHYSICAL THERAPY | Age: 65
Setting detail: THERAPIES SERIES
Discharge: HOME | End: 2025-01-31
Attending: ORTHOPAEDIC SURGERY
Payer: MEDICARE

## 2025-01-31 PROCEDURE — 97112 NEUROMUSCULAR REEDUCATION: CPT

## 2025-01-31 PROCEDURE — 97110 THERAPEUTIC EXERCISES: CPT

## 2025-02-04 ENCOUNTER — HOSPITAL ENCOUNTER (OUTPATIENT)
Dept: PHYSICAL THERAPY | Age: 65
Setting detail: THERAPIES SERIES
Discharge: HOME | End: 2025-02-04
Attending: ORTHOPAEDIC SURGERY
Payer: MEDICARE

## 2025-02-04 PROCEDURE — 97112 NEUROMUSCULAR REEDUCATION: CPT | Performed by: PHYSICAL THERAPIST

## 2025-02-04 NOTE — FLOWSHEET NOTE
return for scheduled/recommended follow up visits, this note will serve as a discharge from care along with the most recent update on progress.    Ortho Evaluation

## 2025-02-07 ENCOUNTER — HOSPITAL ENCOUNTER (OUTPATIENT)
Dept: PHYSICAL THERAPY | Age: 65
Setting detail: THERAPIES SERIES
Discharge: HOME | End: 2025-02-07
Attending: ORTHOPAEDIC SURGERY
Payer: MEDICARE

## 2025-02-07 PROCEDURE — 97112 NEUROMUSCULAR REEDUCATION: CPT

## 2025-02-07 NOTE — FLOWSHEET NOTE
[facility]- Outpatient Rehabilitation and Therapy  [address], [place] [address] office: [phone] fax: [phone]       Physical Therapy: TREATMENT/PROGRESS NOTE   Patient: Radha Malin (64 y.o. female)   Examination Date: 2025   :  1960 MRN: [id number]   Visit #:       Medicare visits: since 24      Insurance Allowable Auth Needed     Authorization Submission Information:    Approval dates: 10/8/24 - 24  Approved visits: 7  Approved codes: 30804,92083,82108,19384,53732,07370  Codes that DO NOT require auth: NA    Denied codes: NA    Did auth pend? NO  Authorization #: [id number]     Comments: NO  []Yes    [x]No    LAUREL Malin  4 ADDL VISITS - 10-29- - 11- - [facility]   10/25/2024         Insurance: Payor: MEDICARE / Plan: MEDICARE PART A AND B / Product Type: *No Product type* /   Insurance ID: [id number] - (Medicare)  Secondary Insurance (if applicable): [place] BCBS        24 requested more visit from [facility]. Pt states she has traditional MC but has not provided any card or MC number as of yet.    24 [place]   Treatment Diagnosis:     ICD-10-CM    1. Difficulty walking  R26.2       2. Left leg pain  M79.605       3. Balance problems  R26.89       4. Muscle weakness of lower extremity  M62.81          Medical Diagnosis:  Pathological fracture in other disease, other site, initial encounter for fracture [M84.68XA]   Referring Physician: Vasile Antunez MD  PCP: Terrell Perez DO     Plan of care signed (Y/N): YES    Date of Patient follow up with Physician: 3/12/25     Plan of Care Report: NO  POC update due: (10 visits /OR AUTH LIMITS, whichever is less)  25                                             Medical History:  Comorbidities:  Cancer/Tumor  Osteoporosis/Osteopenia  Osteoarthritis  Anxiety  Anemia  High cholesterol  Relevant Medical History: R ankle fx  w

## 2025-02-11 ENCOUNTER — HOSPITAL ENCOUNTER (OUTPATIENT)
Dept: PHYSICAL THERAPY | Age: 65
Setting detail: THERAPIES SERIES
Discharge: HOME | End: 2025-02-11
Attending: ORTHOPAEDIC SURGERY
Payer: MEDICARE

## 2025-02-11 PROCEDURE — 97110 THERAPEUTIC EXERCISES: CPT | Performed by: PHYSICAL THERAPIST

## 2025-02-11 NOTE — FLOWSHEET NOTE
[facility]- Outpatient Rehabilitation and Therapy  [address], [place] [address] office: [phone] fax: [phone]       Physical Therapy: TREATMENT/PROGRESS NOTE   Patient: Radha Malin (64 y.o. female)   Examination Date: 2025   :  1960 MRN: [id number]   Visit #:       Medicare visits: since 24      Insurance Allowable Auth Needed     Authorization Submission Information:    Approval dates: 10/8/24 - 24  Approved visits: 7  Approved codes: 11074,45799,86521,34555,33547,84357  Codes that DO NOT require auth: NA    Denied codes: NA    Did auth pend? NO  Authorization #: [id number]     Comments: NO  []Yes    [x]No    LAUREL Malin  4 ADDL VISITS - 10-29-24 - - [facility]   10/25/2024         Insurance: Payor: MEDICARE / Plan: MEDICARE PART A AND B / Product Type: *No Product type* /   Insurance ID: [id number] - (Medicare)  Secondary Insurance (if applicable): [place] BCBS        24 requested more visit from [facility]. Pt states she has traditional MC but has not provided any card or MC number as of yet.    24 [place]   Treatment Diagnosis:     ICD-10-CM    1. Difficulty walking  R26.2       2. Left leg pain  M79.605       3. Balance problems  R26.89       4. Muscle weakness of lower extremity  M62.81          Medical Diagnosis:  Pathological fracture in other disease, other site, initial encounter for fracture [M84.68XA]   Referring Physician: Vasile Antunez MD  PCP: Terrell Perez DO     Plan of care signed (Y/N): YES    Date of Patient follow up with Physician: 3/12/25     Plan of Care Report: NO  POC update due: (10 visits /OR AUTH LIMITS, whichever is less)  25                                             Medical History:  Comorbidities:  Cancer/Tumor  Osteoporosis/Osteopenia  Osteoarthritis  Anxiety  Anemia  High cholesterol  Relevant Medical History: R ankle fx  w

## 2025-02-14 ENCOUNTER — APPOINTMENT (OUTPATIENT)
Dept: PHYSICAL THERAPY | Age: 65
End: 2025-02-14
Attending: ORTHOPAEDIC SURGERY
Payer: MEDICARE

## 2025-02-14 ENCOUNTER — HOSPITAL ENCOUNTER (OUTPATIENT)
Dept: OCCUPATIONAL THERAPY | Age: 65
Setting detail: THERAPIES SERIES
Discharge: HOME | End: 2025-02-14
Attending: ORTHOPAEDIC SURGERY
Payer: MEDICARE

## 2025-02-14 DIAGNOSIS — M25.641 STIFFNESS OF RIGHT HAND JOINT: Primary | ICD-10-CM

## 2025-02-14 PROCEDURE — 97018 PARAFFIN BATH THERAPY: CPT | Performed by: OCCUPATIONAL THERAPIST

## 2025-02-14 PROCEDURE — 97165 OT EVAL LOW COMPLEX 30 MIN: CPT | Performed by: OCCUPATIONAL THERAPIST

## 2025-02-14 PROCEDURE — L3913 HFO W/O JOINTS CF: HCPCS | Performed by: OCCUPATIONAL THERAPIST

## 2025-02-14 NOTE — PLAN OF CARE
[facility] - Outpatient Rehabilitation and Therapy: [address], [place] [address] office: [phone] fax: [phone]     Occupational Therapy Initial Evaluation Certification      Dear Vasile Antunez MD,    We had the pleasure of evaluating the following patient for occupational therapy services at [facility] Outpatient Occupational Therapy.  A summary of our findings can be found in the initial assessment below.  This includes our plan of care.  If you have any questions or concerns regarding these findings, please do not hesitate to contact me at the office phone number listed above.  Thank you for the referral.     Physician Signature:_______________________________Date:__________________  By signing above (or electronic signature), therapist’s plan is approved by physician       Occupational Therapy: TREATMENT/PROGRESS NOTE   Patient: Radha Malin (64 y.o. female)   Examination Date: 2025   :  1960 MRN: [id number]   Visit #: 1  Insurance Allowable Auth Needed    []Yes    []No    Insurance: Payor: MEDICARE / Plan: MEDICARE PART A AND B / Product Type: *No Product type* /   Insurance ID: [id number] - (Medicare)  Secondary Insurance (if applicable): [place] BCBS   Treatment Diagnosis:     ICD-10-CM    1. Stiffness of right hand joint  M25.641          Medical Diagnosis:  Pathological fracture in other disease, other site, initial encounter for fracture [M84.68XA]   Referring Physician: Vasile Antunez MD  PCP: Terrell Perez DO     Plan of care signed (Y/N):     Date of Patient follow up with Physician:      Plan of Care Report: EVAL today  POC update due: (10 visits /OR AUTH LIMITS, whichever is less)  3/14/2025                                             Medical History:  Date of Onset:  Date of Surgery:  Comorbidities:  None  Relevant Medical History:                                          Precautions/ Contra-indications:           Latex allergy:

## 2025-02-18 ENCOUNTER — HOSPITAL ENCOUNTER (OUTPATIENT)
Dept: PHYSICAL THERAPY | Age: 65
Setting detail: THERAPIES SERIES
Discharge: HOME | End: 2025-02-18
Attending: ORTHOPAEDIC SURGERY
Payer: MEDICARE

## 2025-02-18 PROCEDURE — 97110 THERAPEUTIC EXERCISES: CPT | Performed by: PHYSICAL THERAPIST

## 2025-02-18 PROCEDURE — 97140 MANUAL THERAPY 1/> REGIONS: CPT | Performed by: PHYSICAL THERAPIST

## 2025-02-18 NOTE — FLOWSHEET NOTE
tolerance, in order to prevent re-injury.   [] Progressing: [x] Met: [] Not Met: [] Adjusted  2. Patient will have a decrease in pain to <5/10 to facilitate improvement in movement, function, and ADLs as indicated by Functional Deficits.  [] Progressing: [x] Met: [] Not Met: [] Adjusted    Long Term Goals: To be achieved in: 8-12 weeks from POC update 1/14/25    1. Disability index score of 50% or less for the LEFS to assist with reaching prior level of function with activities such as getting in and out of a car.  [x] Progressing: [] Met: [] Not Met: [] Adjusted  2. Patient will demonstrate increased AROM of B ankle DF to 10 degrees without pain to allow for proper joint functioning to enable patient to ambulate with enough tibial advancement to not snap into recurvatum.   [x] Progressing: [] Met: [] Not Met: [] Adjusted  3. Patient will demonstrate increased Strength of BLE KE/KF/HF/HAB to at least 4+/5 throughout without pain to allow for proper functional mobility to enable patient to return to perform sit to stand out of a chair 5x w/o hands.   [x] Progressing: [] Met: [] Not Met: [] Adjusted  4. Patient will return to be able to ascend  5 steps w 1 rail without increased symptoms or restriction.   [x] Progressing: [] Met: [] Not Met: [] Adjusted   pt has not progressed as expected thus far toward this goal  [] Progressing: [] Met: [x] Not Met: [x] Adjusted: this is not longer appropriate      Overall Progression Towards Functional goals/ Treatment Progress Update:  [] Patient is progressing as expected towards functional goals listed.    [x] Progression is slowed due to complexities/Impairments listed.  [] Progression has been slowed due to co-morbidities.  [] Plan just implemented, too soon (<30days) to assess goals progression   [] Goals require adjustment due to lack of progress  [] Patient is not progressing as expected and requires additional follow up with physician  [] Other:     TREATMENT PLAN     2x

## 2025-02-21 ENCOUNTER — HOSPITAL ENCOUNTER (OUTPATIENT)
Dept: OCCUPATIONAL THERAPY | Age: 65
Setting detail: THERAPIES SERIES
Discharge: HOME | End: 2025-02-21
Attending: ORTHOPAEDIC SURGERY
Payer: MEDICARE

## 2025-02-21 ENCOUNTER — APPOINTMENT (OUTPATIENT)
Dept: PHYSICAL THERAPY | Age: 65
End: 2025-02-21
Attending: ORTHOPAEDIC SURGERY
Payer: MEDICARE

## 2025-02-21 PROCEDURE — 97763 ORTHC/PROSTC MGMT SBSQ ENC: CPT | Performed by: OCCUPATIONAL THERAPIST

## 2025-02-21 PROCEDURE — 97018 PARAFFIN BATH THERAPY: CPT | Performed by: OCCUPATIONAL THERAPIST

## 2025-02-21 NOTE — FLOWSHEET NOTE
[facility] - Outpatient Rehabilitation and Therapy: [address], [place] [address] office: [phone] fax: [phone]     Occupational Therapy Initial Evaluation Certification      Dear Vasile Antunez MD,    We had the pleasure of evaluating the following patient for occupational therapy services at [facility] Outpatient Occupational Therapy.  A summary of our findings can be found in the initial assessment below.  This includes our plan of care.  If you have any questions or concerns regarding these findings, please do not hesitate to contact me at the office phone number listed above.  Thank you for the referral.     Physician Signature:_______________________________Date:__________________  By signing above (or electronic signature), therapist’s plan is approved by physician       Occupational Therapy: TREATMENT/PROGRESS NOTE   Patient: Radha Malin (64 y.o. female)   Examination Date: 2025   :  1960 MRN: [id number]   Visit #: 2  Insurance Allowable Auth Needed    []Yes    []No    Insurance: Payor: MEDICARE / Plan: MEDICARE PART A AND B / Product Type: *No Product type* /   Insurance ID: [id number] - (Medicare)  Secondary Insurance (if applicable): [place] BCBS   Treatment Diagnosis: R hand stiffness M25.641  No diagnosis found.     Medical Diagnosis:  Pathological fracture in other disease, other site, initial encounter for fracture [M84.68XA]   Referring Physician: Vasile Antunez MD  PCP: Terrell Perez DO     Plan of care signed (Y/N):     Date of Patient follow up with Physician:      Plan of Care Report:   POC update due: (10 visits /OR AUTH LIMITS, whichever is less)  3/21/2025                                             Medical History:  Date of Onset:  Date of Surgery:  Comorbidities:  None  Relevant Medical History:                                          Precautions/ Contra-indications:           Latex allergy:  NO  Pacemaker:

## 2025-02-25 ENCOUNTER — HOSPITAL ENCOUNTER (OUTPATIENT)
Dept: PHYSICAL THERAPY | Age: 65
Setting detail: THERAPIES SERIES
Discharge: HOME | End: 2025-02-25
Attending: ORTHOPAEDIC SURGERY
Payer: MEDICARE

## 2025-02-25 PROCEDURE — 97110 THERAPEUTIC EXERCISES: CPT | Performed by: PHYSICAL THERAPIST

## 2025-02-25 PROCEDURE — 97140 MANUAL THERAPY 1/> REGIONS: CPT | Performed by: PHYSICAL THERAPIST

## 2025-02-25 NOTE — PLAN OF CARE
of B ankle DF to 10 degrees without pain to allow for proper joint functioning to enable patient to ambulate with enough tibial advancement to not snap into recurvatum.   [] Progressing: [] Met: [x] Not Met: [x] Adjusted no longer appropriate at this time  3. Patient will demonstrate increased Strength of BLE KE/KF/HF/HAB to at least 4+/5 throughout without pain to allow for proper functional mobility to enable patient to return to perform sit to stand out of a chair 5x w/o hands.   [x] Progressing: [] Met: [] Not Met: [] Adjusted  4. Patient will return to be able to ascend  5 steps w 1 rail without increased symptoms or restriction.   [] Progressing: [x] Met: [] Not Met: [x] Adjusted no longer appropriate at this time   pt has not progressed as expected thus far toward this goal  [] Progressing: [] Met: [x] Not Met: [x] Adjusted: this is not longer appropriate      Overall Progression Towards Functional goals/ Treatment Progress Update:  [] Patient is progressing as expected towards functional goals listed.    [x] Progression is slowed due to complexities/Impairments listed.  [] Progression has been slowed due to co-morbidities.  [] Plan just implemented, too soon (<30days) to assess goals progression   [] Goals require adjustment due to lack of progress  [] Patient is not progressing as expected and requires additional follow up with physician  [] Other:     TREATMENT PLAN         Plan: Discharge    Electronically Signed by Amelia Teague, PT          [id number]                                  Date: 02/25/2025     Note: Portions of this note have been templated and/or copied from initial evaluation, reassessments and prior notes for documentation efficiency.    Note: If patient does not return for scheduled/recommended follow up visits, this note will serve as a discharge from care along with the most recent update on progress.    Ortho Evaluation

## 2025-02-28 ENCOUNTER — APPOINTMENT (OUTPATIENT)
Dept: OCCUPATIONAL THERAPY | Age: 65
End: 2025-02-28
Attending: ORTHOPAEDIC SURGERY
Payer: MEDICARE

## 2025-02-28 ENCOUNTER — APPOINTMENT (OUTPATIENT)
Dept: PHYSICAL THERAPY | Age: 65
End: 2025-02-28
Attending: ORTHOPAEDIC SURGERY
Payer: MEDICARE

## 2025-03-25 ENCOUNTER — OFFICE VISIT (OUTPATIENT)
Dept: ORTHOPEDIC SURGERY | Age: 65
End: 2025-03-25
Payer: MEDICARE

## 2025-03-25 VITALS — HEIGHT: 62 IN | BODY MASS INDEX: 25.76 KG/M2 | WEIGHT: 140 LBS

## 2025-03-25 DIAGNOSIS — S82.202D TIBIA/FIBULA FRACTURE, SHAFT, LEFT, CLOSED, WITH ROUTINE HEALING, SUBSEQUENT ENCOUNTER: Primary | ICD-10-CM

## 2025-03-25 DIAGNOSIS — M85.872 OTHER SPECIFIED DISORDERS OF BONE DENSITY AND STRUCTURE, LEFT ANKLE AND FOOT: ICD-10-CM

## 2025-03-25 DIAGNOSIS — S82.402D TIBIA/FIBULA FRACTURE, SHAFT, LEFT, CLOSED, WITH ROUTINE HEALING, SUBSEQUENT ENCOUNTER: Primary | ICD-10-CM

## 2025-03-25 DIAGNOSIS — M19.071 ARTHRITIS OF RIGHT ANKLE: ICD-10-CM

## 2025-03-25 PROCEDURE — 1036F TOBACCO NON-USER: CPT | Performed by: ORTHOPAEDIC SURGERY

## 2025-03-25 PROCEDURE — 3017F COLORECTAL CA SCREEN DOC REV: CPT | Performed by: ORTHOPAEDIC SURGERY

## 2025-03-25 PROCEDURE — G8400 PT W/DXA NO RESULTS DOC: HCPCS | Performed by: ORTHOPAEDIC SURGERY

## 2025-03-25 PROCEDURE — 99213 OFFICE O/P EST LOW 20 MIN: CPT | Performed by: ORTHOPAEDIC SURGERY

## 2025-03-25 PROCEDURE — 1090F PRES/ABSN URINE INCON ASSESS: CPT | Performed by: ORTHOPAEDIC SURGERY

## 2025-03-25 PROCEDURE — G8427 DOCREV CUR MEDS BY ELIG CLIN: HCPCS | Performed by: ORTHOPAEDIC SURGERY

## 2025-03-25 PROCEDURE — G8419 CALC BMI OUT NRM PARAM NOF/U: HCPCS | Performed by: ORTHOPAEDIC SURGERY

## 2025-03-25 PROCEDURE — 1123F ACP DISCUSS/DSCN MKR DOCD: CPT | Performed by: ORTHOPAEDIC SURGERY

## 2025-03-25 NOTE — PROGRESS NOTES
ORTHOPAEDIC SURGERY FOLLOWUP VISIT     CHIEF COMPLAINT:  Left tibia/fibula fractures     DATE OF INJURY: 5/7/2024  DIAGNOSIS: Left segmental tibia/fibular shaft fractures  DATE OF SURGERY: 5/8/2024, left tibia intramedullary nailing     HISTORY OF PRESENT ILLNESS:  63-year-old female presents for repeat evaluation 1 year status post intramedullary nailing of a segmental left tibia/fibular shaft fractures.  Overall she has done well.  She does not have significant complaints.  She has the occasional intermittent sharp pain about the mid tibia.  This is with weightbearing activities but can occasionally be without activity.  She feels her progress is limited by weakness related to her contralateral ankle which had surgery performed at outside facility.  She paused on physical therapy, but is ready to recommit.     PHYSICAL EXAM:  General: Well-appearing.  No distress.  Presents with walker.  Left lower extremity: Incisional sites maturely healed.  No open areas.  No surrounding erythema.  Minimal bruising.  Ankle range of motion is 10 degrees dorsiflexion to 50 degrees plantarflexion without difficulty.  There is no significant lower extremity edema.  Knee range of motion is full extension to 130 degrees of flexion without difficulty.  Moderate quad atrophy bilaterally.  There is tenderness to palpation along the tibial crest at the midportion of the tibia.  Sensation is intact to light touch in deep peroneal, superficial peroneal, tibial, sural, and saphenous nerve distributions.  Motor function is intact to EHL, FHL, tibialis anterior, and gastroc.  There is brisk capillary refill to the toes and a strong palpable dorsalis pedis pulse.  Compartments are soft and compressible.  There is no calf tenderness and a negative Homans' sign.     RADIOGRAPHIC EXAM:  2 views of the left Tibial/fibula demonstrate anatomic positioning of intramedullary nail.  There is evidence of mature bridging bone about the posterior tibia.

## 2025-05-28 ENCOUNTER — TELEPHONE (OUTPATIENT)
Dept: ORTHOPEDIC SURGERY | Age: 65
End: 2025-05-28

## 2025-05-28 NOTE — TELEPHONE ENCOUNTER
----- Message from Madi DAVIDSON sent at 5/28/2025  2:17 PM EDT -----  Regarding: [facility]  Specialty Referral Request    Reason for referral request: Imaging    Specialist/Lab/Test/DME Item patient is requesting (if known): DEXA SCAN     Specialist Phone Number (if applicable): [phone]    Additional Information: PT CALLING IN TO CHANGE THE LOCATION OF DEXA SCAN TO [facility]. PT A PT OF DR. ANN   --------------------------------------------------------------------------------------------------------------------------    Relationship to Patient: Self    Call Back Info: OK to leave message on voicemail  Preferred Call Back Number: Phone

## 2025-06-03 ENCOUNTER — APPOINTMENT (OUTPATIENT)
Dept: PHYSICAL THERAPY | Age: 65
End: 2025-06-03
Payer: MEDICARE

## 2025-06-12 ENCOUNTER — HOSPITAL ENCOUNTER (OUTPATIENT)
Dept: PHYSICAL THERAPY | Age: 65
Setting detail: THERAPIES SERIES
Discharge: HOME | End: 2025-06-12
Payer: MEDICARE

## 2025-06-12 DIAGNOSIS — R26.2 DIFFICULTY WALKING: ICD-10-CM

## 2025-06-12 DIAGNOSIS — G89.29 CHRONIC PAIN OF RIGHT ANKLE: Primary | ICD-10-CM

## 2025-06-12 DIAGNOSIS — M25.571 CHRONIC PAIN OF RIGHT ANKLE: Primary | ICD-10-CM

## 2025-06-12 PROCEDURE — 97110 THERAPEUTIC EXERCISES: CPT | Performed by: PHYSICAL THERAPIST

## 2025-06-12 PROCEDURE — 97161 PT EVAL LOW COMPLEX 20 MIN: CPT | Performed by: PHYSICAL THERAPIST

## 2025-06-12 NOTE — PLAN OF CARE
mobilizations  Patient education on progression of HEP    Plan: POC initiated as per evaluation    Electronically Signed by Amelia Teague, PT  [id number]                                                               Date: 06/12/2025   Note: Portions of this note have been templated and/or copied from initial evaluation, reassessments and prior notes for documentation efficiency.  Note: If patient does not return for scheduled/recommended follow up visits, this note will serve as a discharge from care along with the most recent update on progress.    Ortho Evaluation

## 2025-06-17 ENCOUNTER — APPOINTMENT (OUTPATIENT)
Dept: PHYSICAL THERAPY | Age: 65
End: 2025-06-17
Payer: MEDICARE

## 2025-06-24 ENCOUNTER — HOSPITAL ENCOUNTER (OUTPATIENT)
Dept: PHYSICAL THERAPY | Age: 65
Setting detail: THERAPIES SERIES
Discharge: HOME | End: 2025-06-24
Payer: MEDICARE

## 2025-06-24 PROCEDURE — 97140 MANUAL THERAPY 1/> REGIONS: CPT | Performed by: PHYSICAL THERAPIST

## 2025-06-24 PROCEDURE — 97110 THERAPEUTIC EXERCISES: CPT | Performed by: PHYSICAL THERAPIST

## 2025-06-24 NOTE — FLOWSHEET NOTE
[facility] - Outpatient Rehabilitation and Therapy: [address], [place] [address] office: [phone] fax: [phone]         Physical Therapy: TREATMENT/PROGRESS NOTE   Patient: Radha Malin (64 y.o. female)   Examination Date: 2025   :  1960 MRN: [id number]   Visit #: 2   Insurance Allowable Auth Needed   [place]  []Yes    [x]No    Insurance: Payor: MEDICARE / Plan: MEDICARE PART A AND B / Product Type: *No Product type* /   Insurance ID: [id number] - (Medicare)  Secondary Insurance (if applicable): [place] BCBS   Treatment Diagnosis:     ICD-10-CM    1. Chronic pain of right ankle  M25.571     G89.29       2. Difficulty walking  R26.2          Medical Diagnosis:  Primary osteoarthritis, right ankle and foot [M19.071]   Referring Physician: Vasile Antunez MD  PCP: Terrell Perez DO     Plan of care signed (Y/N): NO    Date of Patient follow up with Physician: none scheduled      Plan of Care Report: NO  POC update due: (10 visits /OR AUTH LIMITS, whichever is less)  2025                                             Medical History:  Comorbidities:  Cancer/Tumor  Osteoporosis/Osteopenia  Osteoarthritis  Anxiety  Other: anemia and HTN  Relevant Medical History: R ankle fx  w hardware. Hardware removal R ankle 2023.  Previous L foot fx x2   B hand OA  Pt has a hx of possible pulled HS at attachment                                         Precautions/ Contra-indications:           Latex allergy:  NO  Pacemaker:    NO  Contraindications for Manipulation: hardware in ankle  Date of Surgery:   Other:    Red Flags:  None    Suicide Screening:   The patient did not verbalize a primary behavioral concern, suicidal ideation, suicidal intent, or demonstrate suicidal behaviors.    Preferred Language for Healthcare:   [x] English       [] other:    SUBJECTIVE EXAMINATION     Patient stated complaint: Pt reports she has been doing her exercises and has

## 2025-07-07 ENCOUNTER — HOSPITAL ENCOUNTER (OUTPATIENT)
Dept: PHYSICAL THERAPY | Age: 65
Setting detail: THERAPIES SERIES
Discharge: HOME | End: 2025-07-07
Payer: MEDICARE

## 2025-07-07 PROCEDURE — 97112 NEUROMUSCULAR REEDUCATION: CPT | Performed by: PHYSICAL THERAPIST

## 2025-07-07 PROCEDURE — 97110 THERAPEUTIC EXERCISES: CPT | Performed by: PHYSICAL THERAPIST

## 2025-07-07 NOTE — FLOWSHEET NOTE
([id number])    [facility]. Traction (65573)     Gait (23453)    Dry Needle 1-2 muscle (00961)     Aquatic Therex (79780)    Dry Needle 3+ muscle (17004)     Iontophoresis (91721)    VASO (68556)     Ultrasound (63630)    Group Therapy (75599)     Estim Attended (79179)    Canalith Repositioning (13982)     Physical Performance Test (17654)    Custom orthotic ()     Other:    Other:    Total Timed Code Tx Minutes 38 3       Total Treatment Minutes 38        Charge Justification:  (94200) THERAPEUTIC EXERCISE - Provided verbal/tactile cueing for HEP and/or activities related to strengthening, flexibility, endurance, ROM performed to prevent loss of range of motion, maintain or improve muscular strength or increase flexibility, following either an injury or surgery.   (69542) NEUROMUSCULAR RE-EDUCATION - Provided therapeutic procedure on activities related to neuromuscular reeducation of movement, balance, coordination, kinesthetic sense, posture, and/or proprioception for sitting and/or standing activities. Provided HEP review and/or progression.    GOALS     Patient stated goal: to have less ankle pain w walking  [] Progressing: [] Met: [x] Not Met: [] Adjusted    Therapist goals for Patient:     Short Term Goals: To be achieved in: 2 weeks   2/2 MET 7/7/254    Independent in HEP and progression per patient tolerance, in order to prevent re-injury.   [] Progressing: [x] Met: [] Not Met: [] Adjusted  Patient will have a decrease in pain to <6/10 to facilitate improvement in movement, function, and ADLs as indicated by Functional Deficits.  [] Progressing: [x] Met: [] Not Met: [] Adjusted      Long Term Goals: To be achieved in: 4 weeks  Disability index score of 20% or less for the FAAM to assist with reaching prior level of function with activities such as standing w/o AD for 10 minutes.  [] Progressing: [] Met: [] Not Met: [] Adjusted  Patient will demonstrate increased AROM of R ankle DF to 5 degrees without pain to

## 2025-07-14 ENCOUNTER — HOSPITAL ENCOUNTER (OUTPATIENT)
Dept: PHYSICAL THERAPY | Age: 65
Setting detail: THERAPIES SERIES
Discharge: HOME | End: 2025-07-14
Payer: MEDICARE

## 2025-07-14 PROCEDURE — 97110 THERAPEUTIC EXERCISES: CPT | Performed by: PHYSICAL THERAPIST

## 2025-07-14 PROCEDURE — 97112 NEUROMUSCULAR REEDUCATION: CPT | Performed by: PHYSICAL THERAPIST

## 2025-07-14 NOTE — PLAN OF CARE
[facility] - Outpatient Rehabilitation and Therapy: [address], [place] [address] office: [phone] fax: [phone]     Physical Therapy Re-Certification Plan of Care    Dear Vasile Antunez MD  ,    We had the pleasure of treating the following patient for physical therapy services at [facility]. A summary of our findings can be found in the updated assessment below.  This includes our plan of care.  If you have any questions or concerns regarding these findings, please do not hesitate to contact me at the office phone number checked above.  Thank you for the referral.     Physician Signature:________________________________Date:__________________  By signing above (or electronic signature), therapist's plan is approved by physician      Total Visits: 4     Overall Response to Treatment:  Pt is slowly responding to treatment and progressing toward goals    Recommendation:    [x] Continue PT 1x / wk for 6-8 weeks.   [] Hold PT, pending MD visit   [] Discharge to [facility]. Follow up with PT or MD PRN.       Physical Therapy: TREATMENT/PROGRESS NOTE   Patient: Radha Malin (64 y.o. female)   Examination Date: 2025   :  1960 MRN: [id number]   Visit #: 4   Insurance Allowable Auth Needed   [place]  []Yes    [x]No    Insurance: Payor: MEDICARE / Plan: MEDICARE PART A AND B / Product Type: *No Product type* /   Insurance ID: [id number] - (Medicare)  Secondary Insurance (if applicable): [place] BCBS   Treatment Diagnosis:     ICD-10-CM    1. Chronic pain of right ankle  M25.571     G89.29       2. Difficulty walking  R26.2          Medical Diagnosis:  Primary osteoarthritis, right ankle and foot [M19.071]   Referring Physician: Vasile Antunez MD  PCP: Terrell Perez DO     Plan of care signed (Y/N): NO    Date of Patient follow up with Physician: none scheduled      Plan of Care Report: NO  POC update due: (10 visits /OR AUTH LIMITS, whichever  Progress Note    SUBJECTIVE:  Yamila Myles is an 39 year old  , who requests a breast and pelvic exam.  BRCA carrier s/p BSO and has Mirena in situ.   Patient is followed by Michelle for primary care.    Concerns today include: refill ERT.     Menstrual History:  Menstrual History 2007 2011 2014 1/15/2016 2016   LAST MENSTRUAL PERIOD 2007 11/15/2009 2014 2016 2016       Last    Lab Results   Component Value Date    PAP NIL 2017     History of abnormal Pap smear: NO - age 30-65 PAP every 5 years with negative HPV co-testing recommended    Last   Lab Results   Component Value Date    HPV16 Negative 2017     Last   Lab Results   Component Value Date    HPV18 Negative 2017     Last   Lab Results   Component Value Date    HRHPV Negative 2017       Mammogram current: yes    HISTORY:  Prescription Medications as of 2019       Rx Number Disp Refills Start End Last Dispensed Date Next Fill Date Owning Pharmacy    albuterol (PROAIR HFA/PROVENTIL HFA/VENTOLIN HFA) 108 (90 BASE) MCG/ACT Inhaler  3 Inhaler 1 3/3/2017    [facility][id number] [address] AT [facility] [address]    Sig: Inhale 2 puffs into the lungs every 6 hours as needed for shortness of breath / dyspnea or wheezing    Class: E-Prescribe    Route: Inhalation    cetirizine (ZYRTEC) 10 MG tablet            Sig: Take 10 mg by mouth daily    Class: Historical    Route: Oral    estradiol (ESTRACE) 2 MG tablet  90 tablet 3 2019    [facility] #[id number] [place], [place] - [address] AT [facility] [address]    Sig: Take 1 tablet (2 mg) by mouth daily    Class: E-Prescribe    Route: Oral    estradiol (VAGIFEM) 10 MCG TABS vaginal tablet  24 tablet 3 2019    [facility] #[id number] [place], [place] - [address] AT [facility] [address]    Sig: Place 1 tablet (10 mcg) vaginally twice a week    Class: E-Prescribe    Route:  Vaginal    fluticasone (FLONASE) 50 MCG/ACT nasal spray  16 g 3 2016    [id number] #[id number] - [place], [place] - [address] AT [facility] [address]    Sig: Spray 2 sprays into both nostrils daily    Class: E-Prescribe    Route: Both Nostrils    levonorgestrel (MIRENA) 20 MCG/24HR IUD            Si each by Intrauterine route once. Lot # [id number]  To be removed by 2017    Class: Historical    Route: Intrauterine    loratadine (CLARITIN) 10 MG tablet            Sig: Take 10 mg by mouth daily    Class: Historical    Route: Oral    montelukast (SINGULAIR) 10 MG tablet  30 tablet 0 3/27/2017    [id number] #[id number] - [place], [place] - [address] AT [facility] [address]    Sig: Take 1 tablet (10 mg) by mouth every evening    Class: E-Prescribe    Route: Oral      Clinic-Administered Medications as of 2019       Dose Frequency Start End    lidocaine 1 % 9 mL 9 mL ONCE 2017     Sig: Inject 9 mLs into the skin once    Route: Intradermal    lidocaine 1 % 9 mL 9 mL ONCE 3/13/2017     Sig: Inject 9 mLs into the skin once    Class: E-Prescribe    Route: Intradermal    sodium bicarbonate 8.4 % injection 1 mEq 1 mEq ONCE 2017     Sig: Inject 1 mL (1 mEq) into the skin once    Route: Intradermal    sodium bicarbonate 8.4 % injection 1 mEq 1 mEq ONCE 3/13/2017     Sig: Inject 1 mL (1 mEq) into the skin once    Class: E-Prescribe    Route: Intradermal        Allergies   Allergen Reactions     Advil [Nsaids] Other (See Comments)     Nasal congestion     Immunization History   Administered Date(s) Administered     Influenza (H1N1) 2009     Influenza (IIV3) PF 10/29/2007, 2010, 2011     TD (ADULT, 7+) 2006     TDAP Vaccine (Adacel) 2012       OB History    Para Term  AB Living   2 2 2 0 0 2   SAB TAB Ectopic Multiple Live Births   0 0 0 0 2   Obstetric Comments   Possible early SAB in      Past Medical History:   Diagnosis Date      BRCA1 positive 6/2001     Breast disorder 6/8/2011     Encounter for insertion of mirena IUD 07/12/16     Hoarseness      Lump or mass in breast 2001    BRCA-1 positive (Mother, MGM w/ breast ca, mother w/ gene +), mother dx at 32)     Microcalcifications of the breast, right uoq 6/8/2011     Past Surgical History:   Procedure Laterality Date     C APPENDECTOMY  1987     INSERT INTRAUTERINE DEVICE N/A 7/12/2016    Procedure: INSERT INTRAUTERINE DEVICE;  Surgeon: Fidelia Ricks MD;  Location: [facility] OR     LAPAROSCOPIC SALPINGO-OOPHORECTOMY Bilateral 7/12/2016    Procedure: LAPAROSCOPIC SALPINGO-OOPHORECTOMY;  Surgeon: Fidelia Ricks MD;  Location: [facility] OR     LAPAROSCOPY OPERATIVE ADULT N/A 7/12/2016    Procedure: LAPAROSCOPY OPERATIVE ADULT;  Surgeon: Fidelia Ricks MD;  Location: [facility] OR     Family History   Problem Relation Age of Onset     Breast Cancer Mother         dx age 32, doing well     Breast Cancer Maternal Grandmother      Diabetes Paternal Grandmother      Breast Cancer Paternal Grandmother      Neurologic Disorder Sister         epilepsy     Social History     Socioeconomic History     Marital status:      Spouse name: None     Number of children: 1     Years of education: BA     Highest education level: None   Occupational History     Occupation: Organizer     Employer: [employer]      Comment: [employer]   Social Needs     Financial resource strain: None     Food insecurity:     Worry: None     Inability: None     Transportation needs:     Medical: None     Non-medical: None   Tobacco Use     Smoking status: Never Smoker     Smokeless tobacco: Never Used   Substance and Sexual Activity     Alcohol use: Yes     Alcohol/week: 0.0 oz     Comment: 2 glasses/wk - none during pregnancy     Drug use: No     Sexual activity: Yes     Partners: Male     Birth control/protection: IUD     Comment: Mirena   Lifestyle     Physical activity:     Days per week: None     Minutes per  "session: None     Stress: None   Relationships     Social connections:     Talks on phone: None     Gets together: None     Attends [employer]ism service: None     Active member of club or organization: None     Attends meetings of clubs or organizations: None     Relationship status: None     Intimate partner violence:     Fear of current or ex partner: None     Emotionally abused: None     Physically abused: None     Forced sexual activity: None   Other Topics Concern     Parent/sibling w/ CABG, MI or angioplasty before 65F 55M? No      Service Not Asked     Blood Transfusions No     Caffeine Concern No     Occupational Exposure No     Hobby Hazards Not Asked     Sleep Concern No     Stress Concern Yes     Comment: life/work -->coping     Weight Concern No     Special Diet No     Back Care Yes     Comment: chiropracter for old injury     Exercise No     Bike Helmet Not Asked     Comment: not biker     Seat Belt No     Self-Exams Not Asked   Social History Narrative    Social Documentation:        Balanced Diet: YES    Calcium intake: less than 2 per day    Caffeine: 0-1 per day    Exercise:  type of activity walk; daily times per week    Sunscreen: Yes    Seatbelts:  Yes    Self Breast Exam:  Yes    Self Testicular Exam: No - n/a    Physical/Emotional/Sexual Abuse: No     Do you feel safe in your environment? Yes        Cholesterol screen up to date: Yes-3 yrs ago per pt.  Not fasting today.     Eye Exam up to date: Yes    Dental Exam up to date: No - 9 months ago.     Pap smear up to date: Yes    Mammogram up to date: Does Not Apply    Dexa Scan up to date: Does Not Apply    Colonoscopy up to date: Does Not Apply    Immunizations up to date: Yes-Td 12/06    Glucose screen if over 40:  No - n/a       ROS    EXAM:  Blood pressure 105/71, pulse 84, height 1.626 m (5' 4\"), weight 65.8 kg (145 lb 1.6 oz), not currently breastfeeding. Body mass index is 24.91 kg/m .  General appearance: Pleasant female in no acute " distress.     BREAST EXAM:  Breast: Without visible skin changes. No dimpling or lesions seen.   Breasts supple, non-tender with palpation, no dominant mass, nodularity, or nipple discharge noted bilaterally. Axillary nodes negative.      PELVIC EXAM:  EG/BUS: Normal genital architecture without lesions, erythema or abnormal secretions Bartholin's, Urethra, McCoy's normal   Urethral meatus: normal   Urethra: no masses, tenderness, or scarring   Bladder: no masses or tenderness   Vagina: moist, pink, rugae with creamy, white and odorless  secretions  Cervix: Multiparous,, no lesions and pink, moist, closed, without lesion or CMT  Uterus: anteverted,   Adnexa: Within normal limits and No masses, nodularity, tenderness  Rectum:anus normal       ASSESSMENT:  Encounter Diagnoses   Name Primary?     S/P BSO (bilateral salpingo-oophorectomy)      Vaginal dryness       39 year old Female Pelvic and Breast Exam  HRT surveillance    PLAN:   Refill estrace and vaginal E2.   Fidelia Ricks      Answers for HPI/ROS submitted by the patient on 7/28/2019   FAYE 7 TOTAL SCORE: 1

## 2025-07-22 ENCOUNTER — HOSPITAL ENCOUNTER (OUTPATIENT)
Dept: PHYSICAL THERAPY | Age: 65
Setting detail: THERAPIES SERIES
Discharge: HOME | End: 2025-07-22
Payer: MEDICARE

## 2025-07-22 PROCEDURE — 97112 NEUROMUSCULAR REEDUCATION: CPT | Performed by: PHYSICAL THERAPIST

## 2025-07-22 PROCEDURE — 97110 THERAPEUTIC EXERCISES: CPT | Performed by: PHYSICAL THERAPIST

## 2025-07-22 NOTE — FLOWSHEET NOTE
[facility] - Outpatient Rehabilitation and Therapy: [address], [place] [address] office: [phone] fax: [phone]         Physical Therapy: TREATMENT/PROGRESS NOTE   Patient: Radha Malin (64 y.o. female)   Examination Date: 2025   :  1960 MRN: [id number]   Visit #: 5   Insurance Allowable Auth Needed   [place]  []Yes    [x]No    Insurance: Payor: MEDICARE / Plan: MEDICARE PART A AND B / Product Type: *No Product type* /   Insurance ID: [id number] - (Medicare)  Secondary Insurance (if applicable): [place] BCBS   Treatment Diagnosis:     ICD-10-CM    1. Chronic pain of right ankle  M25.571     G89.29       2. Difficulty walking  R26.2          Medical Diagnosis:  Primary osteoarthritis, right ankle and foot [M19.071]   Referring Physician: Vasile Antunez MD  PCP: Terrell Perez DO     Plan of care signed (Y/N): NO    Date of Patient follow up with Physician: none scheduled      Plan of Care Report: NO  POC update due: (10 visits /OR AUTH LIMITS, whichever is less)  2025                                             Medical History:  Comorbidities:  Cancer/Tumor  Osteoporosis/Osteopenia  Osteoarthritis  Anxiety  Other: anemia and HTN  Relevant Medical History: R ankle fx  w hardware. Hardware removal R ankle 2023.  Previous L foot fx x2   B hand OA  Pt has a hx of possible pulled HS at attachment                                         Precautions/ Contra-indications:           Latex allergy:  NO  Pacemaker:    NO  Contraindications for Manipulation: hardware in ankle  Date of Surgery:   Other:    Red Flags:  None    Suicide Screening:   The patient did not verbalize a primary behavioral concern, suicidal ideation, suicidal intent, or demonstrate suicidal behaviors.    Preferred Language for Healthcare:   [x] English       [] other:    SUBJECTIVE EXAMINATION     Patient stated complaint: pt states that her exercises are doing well at home, she has

## 2025-08-04 ENCOUNTER — APPOINTMENT (OUTPATIENT)
Dept: PHYSICAL THERAPY | Age: 65
End: 2025-08-04
Payer: MEDICARE

## 2025-08-11 ENCOUNTER — HOSPITAL ENCOUNTER (OUTPATIENT)
Dept: PHYSICAL THERAPY | Age: 65
Setting detail: THERAPIES SERIES
Discharge: HOME | End: 2025-08-11
Payer: MEDICARE

## 2025-08-11 PROCEDURE — 97112 NEUROMUSCULAR REEDUCATION: CPT | Performed by: PHYSICAL THERAPIST

## 2025-08-11 PROCEDURE — 97110 THERAPEUTIC EXERCISES: CPT | Performed by: PHYSICAL THERAPIST

## 2025-08-18 ENCOUNTER — HOSPITAL ENCOUNTER (OUTPATIENT)
Dept: PHYSICAL THERAPY | Age: 65
Setting detail: THERAPIES SERIES
Discharge: HOME | End: 2025-08-18
Payer: MEDICARE

## 2025-08-18 PROCEDURE — 97140 MANUAL THERAPY 1/> REGIONS: CPT | Performed by: PHYSICAL THERAPIST

## 2025-08-18 PROCEDURE — 97110 THERAPEUTIC EXERCISES: CPT | Performed by: PHYSICAL THERAPIST

## (undated) DEVICE — GUIDEWIRE ORTH L400MM DIA3.2MM FOR TFN

## (undated) DEVICE — SUTURE VICRYL + SZ 2-0 L27IN ABSRB CLR CT-1 1/2 CIR TAPERCUT VCP259H

## (undated) DEVICE — GOWN,REINF,POLY,AURORA,XLNG/XXL,STRL: Brand: MEDLINE

## (undated) DEVICE — PADDING CAST W6INXL4YD POLY POR SPUN DACRON NON STERILE

## (undated) DEVICE — NEEDLE,20GX1.5",BD,YALE,DISP,RG: Brand: MEDLINE

## (undated) DEVICE — SCREW LK F/IM NAIL 5X32MM XL25 ST: Type: IMPLANTABLE DEVICE | Site: LEG | Status: NON-FUNCTIONAL

## (undated) DEVICE — SUTURE ETHILON SZ 3-0 L30IN NONABSORBABLE BLK L30MM PSLX 3/8 1691H

## (undated) DEVICE — Z DISCONTINUED USE 2218136 SUTURE ETHILON SZ 3-0 L30IN NONABSORBABLE BLK FSL L30MM 3/8 1671H

## (undated) DEVICE — GLOVE ORTHO 7   MSG9470

## (undated) DEVICE — Device

## (undated) DEVICE — GOWN,REINFORCED,POLY,AURORA,XLARGE,STRL: Brand: MEDLINE

## (undated) DEVICE — LIQUIBAND RAPID ADHESIVE 36/CS 0.8ML: Brand: MEDLINE

## (undated) DEVICE — DRESSING FOAM W4XL4IN SIL FACE BORD ADH PD SUP ABSRB COR

## (undated) DEVICE — BANDAGE COMPR 5 YDX6 IN DBL ACE HONEYCOMB

## (undated) DEVICE — DRESSING ALG W4XL8IN AG FOAM SUPERABSORBENT SIL ANTIMIC

## (undated) DEVICE — C-ARMOR C-ARM EQUIPMENT COVERS CLEAR STERILE UNIVERSAL FIT 12 PER CASE: Brand: C-ARMOR

## (undated) DEVICE — DRAPE 54X23IN MAYO STAND COVER REINF

## (undated) DEVICE — COVER LT HNDL PLAS RIG 2 PER PK

## (undated) DEVICE — SUTURE VICRYL SZ 0 L36IN ABSRB UD CT-1 L36MM 1/2 CIR TAPR PNT VCP946H

## (undated) DEVICE — BIT DRL L145MM DIA4.2MM ST 3 FLUT NDL PNT QUIK CPL FOR FEM

## (undated) DEVICE — GLOVE SURG SZ 75 L12IN FNGR THK79MIL GRN LTX FREE

## (undated) DEVICE — ROD RM 3X950 MM W/ 3.8 MM BALL TIP STRL
Type: IMPLANTABLE DEVICE | Site: LEG | Status: NON-FUNCTIONAL
Removed: 2024-05-08

## (undated) DEVICE — PACK PROCEDURE SURG EXTREMITY SORGER CDS